# Patient Record
Sex: MALE | Race: WHITE | NOT HISPANIC OR LATINO
[De-identification: names, ages, dates, MRNs, and addresses within clinical notes are randomized per-mention and may not be internally consistent; named-entity substitution may affect disease eponyms.]

---

## 2020-09-26 ENCOUNTER — APPOINTMENT (OUTPATIENT)
Dept: RADIOLOGY | Facility: IMAGING CENTER | Age: 50
End: 2020-09-26
Payer: COMMERCIAL

## 2020-09-26 ENCOUNTER — OUTPATIENT (OUTPATIENT)
Dept: OUTPATIENT SERVICES | Facility: HOSPITAL | Age: 50
LOS: 1 days | End: 2020-09-26
Payer: COMMERCIAL

## 2020-09-26 DIAGNOSIS — Z00.8 ENCOUNTER FOR OTHER GENERAL EXAMINATION: ICD-10-CM

## 2020-09-26 PROCEDURE — 73620 X-RAY EXAM OF FOOT: CPT

## 2020-09-26 PROCEDURE — 71046 X-RAY EXAM CHEST 2 VIEWS: CPT | Mod: 26

## 2020-09-26 PROCEDURE — 73620 X-RAY EXAM OF FOOT: CPT | Mod: 26,RT

## 2020-09-26 PROCEDURE — 71046 X-RAY EXAM CHEST 2 VIEWS: CPT

## 2020-10-31 ENCOUNTER — APPOINTMENT (OUTPATIENT)
Dept: ULTRASOUND IMAGING | Facility: IMAGING CENTER | Age: 50
End: 2020-10-31
Payer: COMMERCIAL

## 2020-10-31 ENCOUNTER — OUTPATIENT (OUTPATIENT)
Dept: OUTPATIENT SERVICES | Facility: HOSPITAL | Age: 50
LOS: 1 days | End: 2020-10-31
Payer: COMMERCIAL

## 2020-10-31 DIAGNOSIS — Z00.8 ENCOUNTER FOR OTHER GENERAL EXAMINATION: ICD-10-CM

## 2020-10-31 PROCEDURE — 76700 US EXAM ABDOM COMPLETE: CPT

## 2020-10-31 PROCEDURE — 76700 US EXAM ABDOM COMPLETE: CPT | Mod: 26

## 2022-03-03 PROBLEM — Z00.00 ENCOUNTER FOR PREVENTIVE HEALTH EXAMINATION: Noted: 2022-03-03

## 2022-03-12 ENCOUNTER — APPOINTMENT (OUTPATIENT)
Dept: CT IMAGING | Facility: IMAGING CENTER | Age: 52
End: 2022-03-12

## 2022-03-12 ENCOUNTER — APPOINTMENT (OUTPATIENT)
Dept: RADIOLOGY | Facility: IMAGING CENTER | Age: 52
End: 2022-03-12

## 2022-04-02 ENCOUNTER — OUTPATIENT (OUTPATIENT)
Dept: OUTPATIENT SERVICES | Facility: HOSPITAL | Age: 52
LOS: 1 days | End: 2022-04-02
Payer: COMMERCIAL

## 2022-04-02 ENCOUNTER — APPOINTMENT (OUTPATIENT)
Dept: CT IMAGING | Facility: IMAGING CENTER | Age: 52
End: 2022-04-02
Payer: COMMERCIAL

## 2022-04-02 ENCOUNTER — APPOINTMENT (OUTPATIENT)
Dept: RADIOLOGY | Facility: IMAGING CENTER | Age: 52
End: 2022-04-02
Payer: COMMERCIAL

## 2022-04-02 DIAGNOSIS — Z00.8 ENCOUNTER FOR OTHER GENERAL EXAMINATION: ICD-10-CM

## 2022-04-02 PROCEDURE — 74177 CT ABD & PELVIS W/CONTRAST: CPT

## 2022-04-02 PROCEDURE — 71046 X-RAY EXAM CHEST 2 VIEWS: CPT | Mod: 26

## 2022-04-02 PROCEDURE — 74177 CT ABD & PELVIS W/CONTRAST: CPT | Mod: 26

## 2022-04-02 PROCEDURE — 71046 X-RAY EXAM CHEST 2 VIEWS: CPT

## 2022-08-06 ENCOUNTER — INPATIENT (INPATIENT)
Facility: HOSPITAL | Age: 52
LOS: 0 days | Discharge: LEFT BEFORE DISCHARGE | End: 2022-08-07
Attending: STUDENT IN AN ORGANIZED HEALTH CARE EDUCATION/TRAINING PROGRAM | Admitting: STUDENT IN AN ORGANIZED HEALTH CARE EDUCATION/TRAINING PROGRAM

## 2022-08-06 VITALS
RESPIRATION RATE: 16 BRPM | HEART RATE: 55 BPM | DIASTOLIC BLOOD PRESSURE: 77 MMHG | SYSTOLIC BLOOD PRESSURE: 110 MMHG | OXYGEN SATURATION: 100 % | TEMPERATURE: 97 F | HEIGHT: 70 IN

## 2022-08-06 DIAGNOSIS — S01.81XA LACERATION WITHOUT FOREIGN BODY OF OTHER PART OF HEAD, INITIAL ENCOUNTER: ICD-10-CM

## 2022-08-06 DIAGNOSIS — F31.9 BIPOLAR DISORDER, UNSPECIFIED: ICD-10-CM

## 2022-08-06 DIAGNOSIS — S02.401A MAXILLARY FRACTURE, UNSPECIFIED SIDE, INITIAL ENCOUNTER FOR CLOSED FRACTURE: ICD-10-CM

## 2022-08-06 DIAGNOSIS — Z29.9 ENCOUNTER FOR PROPHYLACTIC MEASURES, UNSPECIFIED: ICD-10-CM

## 2022-08-06 DIAGNOSIS — R55 SYNCOPE AND COLLAPSE: ICD-10-CM

## 2022-08-06 LAB
ALBUMIN SERPL ELPH-MCNC: 4 G/DL — SIGNIFICANT CHANGE UP (ref 3.3–5)
ALP SERPL-CCNC: 62 U/L — SIGNIFICANT CHANGE UP (ref 40–120)
ALT FLD-CCNC: 36 U/L — SIGNIFICANT CHANGE UP (ref 4–41)
ANION GAP SERPL CALC-SCNC: 11 MMOL/L — SIGNIFICANT CHANGE UP (ref 7–14)
AST SERPL-CCNC: 30 U/L — SIGNIFICANT CHANGE UP (ref 4–40)
BASOPHILS # BLD AUTO: 0.04 K/UL — SIGNIFICANT CHANGE UP (ref 0–0.2)
BASOPHILS NFR BLD AUTO: 0.4 % — SIGNIFICANT CHANGE UP (ref 0–2)
BILIRUB SERPL-MCNC: 0.3 MG/DL — SIGNIFICANT CHANGE UP (ref 0.2–1.2)
BUN SERPL-MCNC: 16 MG/DL — SIGNIFICANT CHANGE UP (ref 7–23)
CALCIUM SERPL-MCNC: 8.9 MG/DL — SIGNIFICANT CHANGE UP (ref 8.4–10.5)
CHLORIDE SERPL-SCNC: 108 MMOL/L — HIGH (ref 98–107)
CO2 SERPL-SCNC: 23 MMOL/L — SIGNIFICANT CHANGE UP (ref 22–31)
CREAT SERPL-MCNC: 0.96 MG/DL — SIGNIFICANT CHANGE UP (ref 0.5–1.3)
EGFR: 95 ML/MIN/1.73M2 — SIGNIFICANT CHANGE UP
EOSINOPHIL # BLD AUTO: 0.03 K/UL — SIGNIFICANT CHANGE UP (ref 0–0.5)
EOSINOPHIL NFR BLD AUTO: 0.3 % — SIGNIFICANT CHANGE UP (ref 0–6)
FLUAV AG NPH QL: SIGNIFICANT CHANGE UP
FLUBV AG NPH QL: SIGNIFICANT CHANGE UP
GLUCOSE SERPL-MCNC: 97 MG/DL — SIGNIFICANT CHANGE UP (ref 70–99)
HCT VFR BLD CALC: 44.4 % — SIGNIFICANT CHANGE UP (ref 39–50)
HGB BLD-MCNC: 14.5 G/DL — SIGNIFICANT CHANGE UP (ref 13–17)
IANC: 6.43 K/UL — SIGNIFICANT CHANGE UP (ref 1.8–7.4)
IMM GRANULOCYTES NFR BLD AUTO: 0.2 % — SIGNIFICANT CHANGE UP (ref 0–1.5)
LYMPHOCYTES # BLD AUTO: 1.85 K/UL — SIGNIFICANT CHANGE UP (ref 1–3.3)
LYMPHOCYTES # BLD AUTO: 20.5 % — SIGNIFICANT CHANGE UP (ref 13–44)
MCHC RBC-ENTMCNC: 32.7 GM/DL — SIGNIFICANT CHANGE UP (ref 32–36)
MCHC RBC-ENTMCNC: 32.9 PG — SIGNIFICANT CHANGE UP (ref 27–34)
MCV RBC AUTO: 100.7 FL — HIGH (ref 80–100)
MONOCYTES # BLD AUTO: 0.66 K/UL — SIGNIFICANT CHANGE UP (ref 0–0.9)
MONOCYTES NFR BLD AUTO: 7.3 % — SIGNIFICANT CHANGE UP (ref 2–14)
NEUTROPHILS # BLD AUTO: 6.43 K/UL — SIGNIFICANT CHANGE UP (ref 1.8–7.4)
NEUTROPHILS NFR BLD AUTO: 71.3 % — SIGNIFICANT CHANGE UP (ref 43–77)
NRBC # BLD: 0 /100 WBCS — SIGNIFICANT CHANGE UP
NRBC # FLD: 0 K/UL — SIGNIFICANT CHANGE UP
PLATELET # BLD AUTO: 185 K/UL — SIGNIFICANT CHANGE UP (ref 150–400)
POTASSIUM SERPL-MCNC: 4.4 MMOL/L — SIGNIFICANT CHANGE UP (ref 3.5–5.3)
POTASSIUM SERPL-SCNC: 4.4 MMOL/L — SIGNIFICANT CHANGE UP (ref 3.5–5.3)
PROT SERPL-MCNC: 6.4 G/DL — SIGNIFICANT CHANGE UP (ref 6–8.3)
RBC # BLD: 4.41 M/UL — SIGNIFICANT CHANGE UP (ref 4.2–5.8)
RBC # FLD: 13 % — SIGNIFICANT CHANGE UP (ref 10.3–14.5)
RSV RNA NPH QL NAA+NON-PROBE: SIGNIFICANT CHANGE UP
SARS-COV-2 RNA SPEC QL NAA+PROBE: SIGNIFICANT CHANGE UP
SODIUM SERPL-SCNC: 142 MMOL/L — SIGNIFICANT CHANGE UP (ref 135–145)
TROPONIN T, HIGH SENSITIVITY RESULT: 10 NG/L — SIGNIFICANT CHANGE UP
WBC # BLD: 9.03 K/UL — SIGNIFICANT CHANGE UP (ref 3.8–10.5)
WBC # FLD AUTO: 9.03 K/UL — SIGNIFICANT CHANGE UP (ref 3.8–10.5)

## 2022-08-06 PROCEDURE — 70486 CT MAXILLOFACIAL W/O DYE: CPT | Mod: 26,MA

## 2022-08-06 PROCEDURE — 93010 ELECTROCARDIOGRAM REPORT: CPT | Mod: NC

## 2022-08-06 PROCEDURE — 71046 X-RAY EXAM CHEST 2 VIEWS: CPT | Mod: 26

## 2022-08-06 PROCEDURE — 99285 EMERGENCY DEPT VISIT HI MDM: CPT | Mod: 25

## 2022-08-06 PROCEDURE — 70450 CT HEAD/BRAIN W/O DYE: CPT | Mod: 26,MA

## 2022-08-06 RX ORDER — LANOLIN ALCOHOL/MO/W.PET/CERES
3 CREAM (GRAM) TOPICAL AT BEDTIME
Refills: 0 | Status: DISCONTINUED | OUTPATIENT
Start: 2022-08-06 | End: 2022-08-07

## 2022-08-06 RX ORDER — TETANUS TOXOID, REDUCED DIPHTHERIA TOXOID AND ACELLULAR PERTUSSIS VACCINE, ADSORBED 5; 2.5; 8; 8; 2.5 [IU]/.5ML; [IU]/.5ML; UG/.5ML; UG/.5ML; UG/.5ML
0.5 SUSPENSION INTRAMUSCULAR ONCE
Refills: 0 | Status: COMPLETED | OUTPATIENT
Start: 2022-08-06 | End: 2022-08-06

## 2022-08-06 RX ORDER — BACITRACIN ZINC 500 UNIT/G
1 OINTMENT IN PACKET (EA) TOPICAL DAILY
Refills: 0 | Status: DISCONTINUED | OUTPATIENT
Start: 2022-08-06 | End: 2022-08-07

## 2022-08-06 RX ADMIN — TETANUS TOXOID, REDUCED DIPHTHERIA TOXOID AND ACELLULAR PERTUSSIS VACCINE, ADSORBED 0.5 MILLILITER(S): 5; 2.5; 8; 8; 2.5 SUSPENSION INTRAMUSCULAR at 19:11
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
## 2022-08-06 NOTE — ED PROVIDER NOTE - CLINICAL SUMMARY MEDICAL DECISION MAKING FREE TEXT BOX
50 y/o male with hx of bipolar disorder type 1 presents with family for evaluation following unwitnessed syncopal episode. Concern for ACS vs arrhythmia. EKG, labs, and CT head obtained. Lac to be repaired by plastics.

## 2022-08-06 NOTE — H&P ADULT - PROBLEM SELECTOR PLAN 2
- Appreciate plastics recs - Sustained form syncopal episode. Left malar laceration, curvilinear  - Repaired by plastics in ED  - Per plastics recs:  clindamycin x 3 days po and bacitracin to incision x 3 days  -  soapy water over incision  - no pools/submerging in water x 6 weeks  - avoid sunlight, can use sunscreen in 2 weeks   - Appreciate plastics recs

## 2022-08-06 NOTE — H&P ADULT - PROBLEM SELECTOR PLAN 1
Unclear etiology of syncope. Patient with lightheadedness prior. Differential includes arrhythmia, cardiac structural cause, orthostasis.   - CTH negative.   - EKG here:  - orthostatic vitals in AM  - f/u TTE  -continue to monitor on telemetry Unclear etiology of syncope. Patient with lightheadedness prior. Differential includes arrhythmia, cardiac structural cause, orthostasis.   - CTH negative.   - bradycardic to HR 40s here, asymptomatic  - orthostatic vitals in AM  - f/u TTE  -continue to monitor on telemetry

## 2022-08-06 NOTE — H&P ADULT - NSHPLABSRESULTS_GEN_ALL_CORE
14.5   9.03  )-----------( 185      ( 06 Aug 2022 17:54 )             44.4       08-06    142  |  108<H>  |  16  ----------------------------<  97  4.4   |  23  |  0.96    Ca    8.9      06 Aug 2022 17:54    TPro  6.4  /  Alb  4.0  /  TBili  0.3  /  DBili  x   /  AST  30  /  ALT  36  /  AlkPhos  62  08-06          < from: CT Maxillofacial No Cont (08.06.22 @ 19:01) >    IMPRESSIONS:    CT BRAIN: Motion limited examination. No intracranial hemorrhage or   calvarial fracture.    CT MAXILLOFACIAL BONES:  1. Questionable fracture of the maxillary spine. Correlate for point   tenderness.  2. Mandible is intact. However, there is anterior displacement of the   right mandibular condyle when compared with the left. While this might be   positional in etiology, correlate for point tenderness. Consider further   evaluation via open and closed mouth MR imaging of the temporomandibular   joint, for further assessment, as clinically indicated.    < end of copied text >    < from: Xray Chest 2 Views PA/Lat (08.06.22 @ 17:25) >      INTERPRETATION:  clear lungs    < end of copied text >

## 2022-08-06 NOTE — ED ADULT NURSE REASSESSMENT NOTE - NS ED NURSE REASSESS COMMENT FT1
Patient AOX4 stated that he passed out then fell. He stated that he loss conscious. Also taking medication for depression, claimed that he stopped taking his depression medication 3 months ago. Having difficulty sleeping for the past three month only have 20hrs of sleep within the  period of three months. 2cm laceration noted under left eye. Tetanus shot was administered as per protocol to left deltoid.

## 2022-08-06 NOTE — ED PROVIDER NOTE - NS ED ROS FT
ROS:  GENERAL: No fever, no chills  HEENT: no vision changes, no trouble swallowing, no trouble speaking  CARDIAC: no chest pain  PULMONARY: no cough, no shortness of breath  GI: no abdominal pain, no nausea, no vomiting, no diarrhea, no constipation  SKIN: As per HPI   NEURO: As per HPI   PSYCH: As per HPI   MSK: No joint pain  All other systems reviewed as negative.

## 2022-08-06 NOTE — H&P ADULT - ATTENDING COMMENTS
52 y/o male with hx of bipolar disorder type 1 who presented to the ED after an unwitnessed syncopal episode, found to have questionable fracture of the maxillary spine. Patient was initially admitted to medicine for further work-up. By the time I went to see the patient, he expressed wishes to leave AMA. His wife was at bedside and expressed the same wishes. They did not want any further management or treatment. They wanted to go to [facility] for further work up. I explained to them if he left, he would leave AMA. I explained the risks of leaving without proper work up and treatment which include recurrence and in worst case scenario death. He understood and verbalized his understanding. At time of his discharge, he was AAOX3 and had good insight. He had the capacity to make his own decision.

## 2022-08-06 NOTE — PROCEDURE NOTE - ADDITIONAL PROCEDURE DETAILS
6 cm vertical curvilinear wound  reapproximated with vicryl after copiously irrigating  5-0 fast for skin

## 2022-08-06 NOTE — ED PROVIDER NOTE - ATTENDING APP SHARED VISIT CONTRIBUTION OF CARE
I have evaluated the patient and agree with the documentation and assessment as made by the KENNA. We have discussed plan of care and work up for the patient.   This was a shared visit with the KENNA. I reviewed and verified the documentation and independently performed the documented:   History, Exam and Medical Decision Making.    51M, bipolar disorder, who presents after a syncopal episode. This morning, was about to eat dinner, began to feel lightheaded and then syncopized and hit his L cheek on a nearby countertop. Never syncopized before. Never seen a cardiologist. Never had prior TTE. Of note, patient is brought in by family - has been non-compliant with meds and generally has been more aggressive over the past 3 weeks. On sertraline and other meds that they left at home. Patient does not sleep; gambles constantly. No headaches, fevers, chills, cough, sputum, belly pain, nvd. Physical: afebrile, non-toxic appearing, calm, ~4cm curvilinear lac noted to L maxilla, rrr, ctabl, abdomen soft, no le edema. Plan: trop, labs, EKG. Lac repair. Dispo pending.

## 2022-08-06 NOTE — ED ADULT NURSE NOTE - OBJECTIVE STATEMENT
Break coverage- Pt received into spot #29. AAOx3, c/o left sided facial pain after having a syncopal episode. Pt states he felt dizzy and fell onto the ground. Laceration noted to left cheek bone. Dressing in place, bleeding is controlled. Denies chest pain at this time. MD gramajo performed. EMS placed IVSL to left ac, labs drawn and sent. no acute distress. Awaiting further plan of care.

## 2022-08-06 NOTE — CONSULT NOTE ADULT - PROBLEM SELECTOR RECOMMENDATION 9
See procedure note  clinda x 3 days po  bactracin to incision x 3 days  can shower tomorrow and allow soapy water over incision, pat dry  no pools/submerging in water x 6 weeks  avoid sunlight, can use sunscreen in 2 weeks   protective clothing

## 2022-08-06 NOTE — ED ADULT TRIAGE NOTE - CHIEF COMPLAINT QUOTE
presents with unwitnessed syncopal episode today ~1230, found by sister on the floor. Lacerations noted to left cheek., denies anti-coagulant use. Appears lethargic. Denies any chest pain or SOB. Non-compliant with psych meds.  pMHX Bipolar, Depression

## 2022-08-06 NOTE — H&P ADULT - PROBLEM SELECTOR PLAN 4
- Concern for worsening sowmya given family history of sleeplessness, gambling.   -Reported non-compliance with home medications  - Psych consult in AM.

## 2022-08-06 NOTE — ED PROVIDER NOTE - PHYSICAL EXAMINATION
CONSTITUTIONAL: Well-appearing; well-nourished; in no apparent distress. Non-toxic appearing.   HEAD: Normocephalic. (+) 5 cm gapping laceration to the left zygoma with TTP.   NEURO: Alert & oriented. Gait steady without assistance. Sensory and motor functions are grossly intact.  PSYCH: Mood appropriate. Thought processes intact.   ENT: Nose midline, non-tender. Lips and mouth atraumatic no edema or lacerations noted.   NECK: Supple. No midline bony tenderness.   CARD: Regular rate and rhythm, no murmurs  RESP: No accessory muscle use; breath sounds clear and equal bilaterally; no wheezes, rhonchi, or rales     ABD: Soft; non-distended; non-tender.   MUSCULOSKELETAL/EXTREMITIES: FROM in all four extremities; no extremity edema.  BACK: No midline bony tenderness. No paraspinous muscle tenderness.   SKIN: Warm; dry; no apparent lesions or exudate

## 2022-08-06 NOTE — H&P ADULT - NSHPREVIEWOFSYSTEMS_GEN_ALL_CORE
CONSTITUTIONAL:  No weight loss, fever, chills, weakness or fatigue.  HEENT:  Eyes:  No visual loss, blurred vision, double vision or yellow sclerae.   SKIN:  No rash or itching.  CARDIOVASCULAR:  No chest pain, chest pressure or chest discomfort. No palpitations.  RESPIRATORY:  No shortness of breath, cough or sputum.  GASTROINTESTINAL:  No anorexia, nausea, vomiting or diarrhea. No abdominal pain or blood.  GENITOURINARY:  Denies hematuria, dysuria.   NEUROLOGICAL:  No headache, dizziness, syncope, paralysis, ataxia, numbness or tingling in the extremities. No change in bowel or bladder control.  MUSCULOSKELETAL:  No muscle, back pain, joint pain or stiffness.  HEMATOLOGIC:  No anemia, bleeding or bruising.  LYMPHATICS:  No enlarged nodes.   PSYCHIATRIC:  No history of depression or anxiety.  ENDOCRINOLOGIC:  No reports of sweating, cold or heat intolerance. No polyuria or polydipsia.  ALLERGIES:  No history of asthma, hives, eczema or rhinitis.
IMPROVE-DD Assessment completed: May  1 2021  8:39PM

## 2022-08-06 NOTE — ED ADULT NURSE REASSESSMENT NOTE - NS ED NURSE REASSESS COMMENT FT1
patient wife requested to sign out  against medical advice.  wife is also requesting to sign him out against medical  advice   admitting medicine was paged . Patient was educated on the risk and benefits including death.

## 2022-08-06 NOTE — PROCEDURE NOTE - NSICDXPROCEDURE_GEN_ALL_CORE_FT
PROCEDURES:  Intermediate repair of laceration of face, 5.1 cm to 7.5 cm 06-Aug-2022 18:07:01  Emile Calloway

## 2022-08-06 NOTE — CONSULT NOTE ADULT - SUBJECTIVE AND OBJECTIVE BOX
51 year old male s/p syncopal episode likely2/2 to decreased PO intake at baseline 2/2 to bipolar disorder as per family.  Patient states he fell and hit his face suddenly ealier today    pmhx: bipolar d/o  no pshx  denies taking medications  allergy to PCN and pollen]    AVSS (HR 55)    AOX3  left malar laceration through subcutaneous fat, 6 cm, vertical, curvilinear, facial nerve function grossly intact, sensation to face grossly intact, no hematoma noted, no drainage noted

## 2022-08-06 NOTE — ED PROVIDER NOTE - NS ED ATTENDING STATEMENT MOD
This was a shared visit with the KENNA. I reviewed and verified the documentation and independently performed the documented:

## 2022-08-06 NOTE — H&P ADULT - PROBLEM SELECTOR PLAN 3
- CT with possible maxillary fracture.   - OMFS consulted in ED, await recs - CT with possible maxillary fracture. Likely 2/2 fall.   - OMFS consulted in ED, await recs

## 2022-08-06 NOTE — H&P ADULT - ASSESSMENT
50 y/o male with hx of bipolar disorder type 1 who presented to the ED after an unwitnessed syncopal episode.

## 2022-08-06 NOTE — ED PROVIDER NOTE - PROGRESS NOTE DETAILS
GHADA Mercado: Plastics consulted as patient would prefer them to repair the wound. Case d/w plastics on call Dr. Castelalnos who requested we contact Dr. Calloway for repair. Dr. Calloway called at [phone] VM left. Pt found to be vinod to high 40s on monitor, resting comfortably at this time no chest pain came back up to 50s upon conversation, still flighty but easily redirectable at this time. - Zee Monroe, PGY-2

## 2022-08-06 NOTE — H&P ADULT - NSHPPHYSICALEXAM_GEN_ALL_CORE
Vital Signs Last 24 Hrs  T(C): 36.3 (06 Aug 2022 13:41), Max: 36.3 (06 Aug 2022 13:41)  T(F): 97.3 (06 Aug 2022 13:41), Max: 97.3 (06 Aug 2022 13:41)  HR: 55 (06 Aug 2022 13:41) (55 - 55)  BP: 110/77 (06 Aug 2022 13:41) (110/77 - 110/77)  BP(mean): --  RR: 16 (06 Aug 2022 13:41) (16 - 16)  SpO2: 100% (06 Aug 2022 13:41) (100% - 100%)    Parameters below as of 06 Aug 2022 13:41  Patient On (Oxygen Delivery Method): room air    PHYSICAL EXAM:  GENERAL: NAD, Resting in bed  HEENT:  Head atraumatic, EOMI, PERRLA, conjunctiva and sclera clear; Moist mucous membranes, normal oropharynx  NECK: Supple, No JVD, no lymphadenopathy, no thyroid nodules or enlargement  CHEST/LUNG: Clear to auscultation bilaterally; No rales, rhonchi, wheezing, or rubs. Unlabored respirations on room air  HEART: Regular rate and rhythm; No murmurs, rubs, or gallops  ABDOMEN: Bowel sounds present; Soft, Nontender, Nondistended. No hepatomegally  EXTREMITIES:  2+ Peripheral Pulses, brisk capillary refill. No clubbing, cyanosis, or edema  NERVOUS SYSTEM:  Alert & Oriented X3, non-focal and spontaneous movements of all extremities  SKIN: No rashes or lesions Vital Signs Last 24 Hrs  T(C): 36.3 (06 Aug 2022 13:41), Max: 36.3 (06 Aug 2022 13:41)  T(F): 97.3 (06 Aug 2022 13:41), Max: 97.3 (06 Aug 2022 13:41)  HR: 55 (06 Aug 2022 13:41) (55 - 55)  BP: 110/77 (06 Aug 2022 13:41) (110/77 - 110/77)  BP(mean): --  RR: 16 (06 Aug 2022 13:41) (16 - 16)  SpO2: 100% (06 Aug 2022 13:41) (100% - 100%)    Parameters below as of 06 Aug 2022 13:41  Patient On (Oxygen Delivery Method): room air    PHYSICAL EXAM:  GENERAL: NAD, Resting in bed  HEENT:  Head atraumatic, PERRLA, conjunctiva and sclera clear  NECK: Supple, No JVD, no lymphadenopathy, no thyroid nodules or enlargement  CHEST/LUNG: Clear to auscultation bilaterally; No rales, rhonchi, wheezing, or rubs. Unlabored respirations on room air  HEART: Regular rate and rhythm; No murmurs, rubs, or gallops  ABDOMEN: Bowel sounds present; Soft, Nontender, Nondistended. No hepatomegally  EXTREMITIES:  2+ Peripheral Pulses, brisk capillary refill. No clubbing, cyanosis, or edema  NERVOUS SYSTEM:  Alert & Oriented X3, non-focal and spontaneous movements of all extremities  SKIN: Left malar laceration, curvilinear

## 2022-08-06 NOTE — ED ADULT NURSE REASSESSMENT NOTE - NS ED NURSE REASSESS COMMENT FT1
patient alert and oriented x4 with period of confusion patient has a laceration  left side of his face . patient is breathing freely on room air  with normal and equal chest rise  and fall .pt is as attached to monitor but is attached to cardiac monitor with bradycardia  HR runs into the high 50s to low 40s. pt also attached to pulse oximetry for continuous monitoring.  patient is asymptomatic denies SOB, dizziness , headache , palpitations, nausea vomiting and chest pain at this time. will continue care and monitor.

## 2022-08-06 NOTE — H&P ADULT - HISTORY OF PRESENT ILLNESS
50 y/o male with hx of bipolar disorder type 1 who presented to the ED after an unwitnessed syncopal episode. Patient was at home, about to eat cake, then felt lightheaded and hit his face/cheek on the countertop. Denies prior headache, dizziness. States that his has not happened before. Thinks he passed out for about 1 minute.     On ED chart review, patient was also accompanied by  brother and sister-in-law, at bedside. State that patient has been non-compliant with psych meds with intermittent aggression for the past three weeks. States that he rarely sleeps or eat and that he also gambles at night.       In the ED, Vital Signs T(F): 97.3, HR: 55, BP: 110/77, RR: 16 , SpO2: 100% on RA. CT maxillary for possible maxillary spine fracture. Also seen by plastics for laceration repair      Sister in law Chanda's cell [phone]

## 2022-08-07 ENCOUNTER — TRANSCRIPTION ENCOUNTER (OUTPATIENT)
Age: 52
End: 2022-08-07

## 2022-08-07 VITALS
DIASTOLIC BLOOD PRESSURE: 83 MMHG | SYSTOLIC BLOOD PRESSURE: 136 MMHG | RESPIRATION RATE: 18 BRPM | OXYGEN SATURATION: 99 % | HEART RATE: 66 BPM

## 2022-08-07 VITALS
HEART RATE: 75 BPM | HEIGHT: 70 IN | RESPIRATION RATE: 16 BRPM | SYSTOLIC BLOOD PRESSURE: 118 MMHG | OXYGEN SATURATION: 100 % | TEMPERATURE: 98 F | DIASTOLIC BLOOD PRESSURE: 73 MMHG

## 2022-08-07 VITALS
DIASTOLIC BLOOD PRESSURE: 87 MMHG | SYSTOLIC BLOOD PRESSURE: 129 MMHG | TEMPERATURE: 99 F | OXYGEN SATURATION: 100 % | RESPIRATION RATE: 18 BRPM | HEART RATE: 56 BPM

## 2022-08-07 DIAGNOSIS — S01.81XA LACERATION WITHOUT FOREIGN BODY OF OTHER PART OF HEAD, INITIAL ENCOUNTER: ICD-10-CM

## 2022-08-07 DIAGNOSIS — Z29.9 ENCOUNTER FOR PROPHYLACTIC MEASURES, UNSPECIFIED: ICD-10-CM

## 2022-08-07 DIAGNOSIS — F31.9 BIPOLAR DISORDER, UNSPECIFIED: ICD-10-CM

## 2022-08-07 DIAGNOSIS — R55 SYNCOPE AND COLLAPSE: ICD-10-CM

## 2022-08-07 DIAGNOSIS — S02.401A MAXILLARY FRACTURE, UNSPECIFIED SIDE, INITIAL ENCOUNTER FOR CLOSED FRACTURE: ICD-10-CM

## 2022-08-07 LAB
FLUAV AG NPH QL: SIGNIFICANT CHANGE UP
FLUBV AG NPH QL: SIGNIFICANT CHANGE UP
RSV RNA NPH QL NAA+NON-PROBE: SIGNIFICANT CHANGE UP
SARS-COV-2 RNA SPEC QL NAA+PROBE: SIGNIFICANT CHANGE UP

## 2022-08-07 PROCEDURE — 99223 1ST HOSP IP/OBS HIGH 75: CPT

## 2022-08-07 PROCEDURE — 99285 EMERGENCY DEPT VISIT HI MDM: CPT

## 2022-08-07 RX ORDER — ENOXAPARIN SODIUM 100 MG/ML
40 INJECTION SUBCUTANEOUS EVERY 24 HOURS
Refills: 0 | Status: DISCONTINUED | OUTPATIENT
Start: 2022-08-07 | End: 2022-08-07

## 2022-08-07 RX ORDER — ACETAMINOPHEN 500 MG
650 TABLET ORAL EVERY 6 HOURS
Refills: 0 | Status: DISCONTINUED | OUTPATIENT
Start: 2022-08-07 | End: 2022-08-07

## 2022-08-07 RX ORDER — BACITRACIN ZINC 500 UNIT/G
1 OINTMENT IN PACKET (EA) TOPICAL
Refills: 0 | Status: DISCONTINUED | OUTPATIENT
Start: 2022-08-07 | End: 2022-08-07

## 2022-08-07 RX ADMIN — Medication 650 MILLIGRAM(S): at 21:23

## 2022-08-07 RX ADMIN — Medication 300 MILLIGRAM(S): at 19:02

## 2022-08-07 RX ADMIN — Medication 1 APPLICATION(S): at 19:03

## 2022-08-07 NOTE — DISCHARGE NOTE PROVIDER - HOSPITAL COURSE
50 y/o male with hx of bipolar disorder type 1 who presented to the ED after an unwitnessed syncopal episode. Patient was at home, about to eat cake, then felt lightheaded and hit his face/cheek on the countertop. Denies prior headache, dizziness. States that his has not happened before. Thinks he passed out for about 1 minute.     On ED chart review, patient was also accompanied by  brother and sister-in-law, at bedside. State that patient has been non-compliant with psych meds with intermittent aggression for the past three weeks. States that he rarely sleeps or eat and that he also gambles at night.       In the ED, Vital Signs T(F): 97.3, HR: 55, BP: 110/77, RR: 16 , SpO2: 100% on RA. CT maxillary for possible maxillary spine fracture. Also seen by plastics for laceration repair.    Called by nursing staff to evaluate patient wanting to leave against medical advice Patient admitted for Syncope with laceration and possible maxillary fracture. At the time of discharge, this patient demonstrated full medical capacity and judgement.  In my conversation with this patient they demonstrated the followin. This patient demonstrated their ability to express and communicate their choice to leave the hospital against medical advice and to refuse further medical care.      2. This patient demonstrated the ability to understand relevant information regarding their diagnosis including the purpose of recommended treatment for their stated medical condition.  The patient remembered this information and demonstrated that they were part of the decision making process in the course of their care.      3. This patient appreciated the significance of their medical condition, their diagnosis, and the consequences for leaving the hospital against medical advice and refusing further medical treatment.  This patient demonstrated clear understanding of the benefits of further evaluation and treatment.  This patient demonstrated clear understanding of the risks of leaving against medical advice and refusing further medical treatment. Explained to patient risks of leaving hospital against medical advice which include further syncope, collapse, and even death. He understands risks of leaving. AMA paperwork signed. Risks also reiterated by wife at bedside. Attending notified.

## 2022-08-07 NOTE — H&P ADULT - PROBLEM SELECTOR PLAN 2
- Sustained form syncopal episode. Left malar laceration, curvilinear  - Repaired by plastics in ED  - Per plastics recs: clindamycin x 3 days po and bacitracin to incision x 3 days  - soapy water over incision  - no pools/submerging in water x 6 weeks  - avoid sunlight, can use sunscreen in 2 weeks

## 2022-08-07 NOTE — ED ADULT NURSE REASSESSMENT NOTE - NS ED NURSE REASSESS COMMENT FT1
Pt requesting to AMA from hospital. ACP notified and in ED to speak with patient. Pt A&Ox4. Pt's IV removed. Per ACP, pt is able to AMA and they will complete appropriate paperwork. Pt provided with 2 metrocards.
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
## 2022-08-07 NOTE — H&P ADULT - NSHPPHYSICALEXAM_GEN_ALL_CORE
PHYSICAL EXAM:  Vital Signs Last 24 Hrs  T(C): 36.9 (07 Aug 2022 12:27), Max: 37 (07 Aug 2022 00:29)  T(F): 98.5 (07 Aug 2022 12:27), Max: 98.6 (07 Aug 2022 00:29)  HR: 75 (07 Aug 2022 12:27) (56 - 75)  BP: 118/73 (07 Aug 2022 12:27) (118/73 - 129/87)  BP(mean): --  RR: 16 (07 Aug 2022 12:27) (16 - 18)  SpO2: 100% (07 Aug 2022 12:27) (100% - 100%)    Parameters below as of 07 Aug 2022 12:27  Patient On (Oxygen Delivery Method): room air      CONSTITUTIONAL: NAD, well-developed, well-groomed  EYES: PERRLA; conjunctiva and sclera clear  ENMT: Moist oral mucosa, no pharyngeal injection or exudates; normal dentition  NECK: Supple, no palpable masses; no thyromegaly  RESPIRATORY: Normal respiratory effort; lungs are clear to auscultation bilaterally  CARDIOVASCULAR: Regular rate and rhythm, normal S1 and S2, no murmur/rub/gallop; No lower extremity edema; Peripheral pulses are 2+ bilaterally  ABDOMEN: Nontender to palpation, normoactive bowel sounds, no rebound/guarding; No hepatosplenomegaly  MUSCULOSKELETAL:  Normal gait; no clubbing or cyanosis of digits; no joint swelling or tenderness to palpation  PSYCH: A+O to person, place, and time; affect appropriate  NEUROLOGY: CN 2-12 are intact and symmetric; no gross sensory deficits   SKIN: No rashes; no palpable lesions

## 2022-08-07 NOTE — ED ADULT NURSE NOTE - NSIMPLEMENTINTERV_GEN_ALL_ED
Implemented All Fall Risk Interventions:  [place] to call system. Call bell, personal items and telephone within reach. Instruct patient to call for assistance. Room bathroom lighting operational. Non-slip footwear when patient is off stretcher. Physically safe environment: no spills, clutter or unnecessary equipment. Stretcher in lowest position, wheels locked, appropriate side rails in place. Provide visual cue, wrist band, yellow gown, etc. Monitor gait and stability. Monitor for mental status changes and reorient to person, place, and time. Review medications for side effects contributing to fall risk. Reinforce activity limits and safety measures with patient and family.

## 2022-08-07 NOTE — H&P ADULT - ASSESSMENT
Patient is a 52yo M with PMH of bipolar disorder type 1 who presented after an unwitnessed syncopal event 1 day prior to presentation.

## 2022-08-07 NOTE — ED PROVIDER NOTE - PHYSICAL EXAMINATION
CONSTITUTIONAL: appears fatigue. A+O x3.   NEURO: Alert & oriented. Gait steady without assistance. Sensory and motor functions are grossly intact.  PSYCH: Mood appropriate. Thought processes intact.   NECK: Supple  CARD: Regular rate and rhythm, no murmurs  RESP: No accessory muscle use; breath sounds clear and equal bilaterally; no wheezes, rhonchi, or rales     ABD: Soft; non-distended; non-tender.   MUSCULOSKELETAL/EXTREMITIES: FROM in all four extremities; no extremity edema.  SKIN: suture to left cheek. Warm; dry; no apparent lesions or exudate

## 2022-08-07 NOTE — ED ADULT TRIAGE NOTE - CHIEF COMPLAINT QUOTE
Pt KIANA this AM was being worked up for syncopal episode his family brought him back to the ED for rest of his work up.

## 2022-08-07 NOTE — ED ADULT NURSE NOTE - EXTENSIONS OF SELF_ADULT
None sinus symptoms/nasal congestion/nasal discharge/nasal obstruction/post-nasal discharge/throat pain/dysphagia

## 2022-08-07 NOTE — H&P ADULT - NSHPLABSRESULTS_GEN_ALL_CORE
LABS:                        14.5   9.03  )-----------( 185      ( 06 Aug 2022 17:54 )             44.4     08-06    142  |  108<H>  |  16  ----------------------------<  97  4.4   |  23  |  0.96    Ca    8.9      06 Aug 2022 17:54    TPro  6.4  /  Alb  4.0  /  TBili  0.3  /  DBili  x   /  AST  30  /  ALT  36  /  AlkPhos  62  08-06        RADIOLOGY & ADDITIONAL TESTS:  New Results Reviewed Today:   New Imaging Personally Reviewed Today: CXR, CT maxillofacial  New Electrocardiogram Personally Reviewed Today: ECG: NSR, incomplete RBBB    COMMUNICATION:  Care Discussed with Consultants/Other Providers and Details of Discussion: discussed with ACP  Discussions with Patient/Family: discussed plan of care with patient

## 2022-08-07 NOTE — CHART NOTE - NSCHARTNOTEFT_GEN_A_CORE
Called by nursing staff to evaluate patient wanting to leave against medical advice Patient admitted for Syncope with laceration and possible maxillary fracture. At the time of discharge, this patient demonstrated full medical capacity and judgement.  In my conversation with this patient they demonstrated the followin. This patient demonstrated their ability to express and communicate their choice to leave the hospital against medical advice and to refuse further medical care.      2. This patient demonstrated the ability to understand relevant information regarding their diagnosis including the purpose of recommended treatment for their stated medical condition.  The patient remembered this information and demonstrated that they were part of the decision making process in the course of their care.      3. This patient appreciated the significance of their medical condition, their diagnosis, and the consequences for leaving the hospital against medical advice and refusing further medical treatment.  This patient demonstrated clear understanding of the benefits of further evaluation and treatment.  This patient demonstrated clear understanding of the risks of leaving against medical advice and refusing further medical treatment. Explained to patient risks of leaving hospital against medical advice which include further syncope, collapse, and even death. He understands risks of leaving. AMA paperwork signed. Risks also reiterated by wife at bedside. Attending notified.     - Mckenna Harrell, PGY-3  Internal Medicine

## 2022-08-07 NOTE — CHART NOTE - NSCHARTNOTEFT_GEN_A_CORE
[facility] was paged regarding this individual due to concern for possible mandibular condyle dislocation. When I went to go see the patient, I was informed that pt had decided to leave hospital AMA. No examination or treatment was performed for this patient by [facility] prior to him leaving the hospital.    Luis Benitez [facility]  Oral & Maxillofacial Surgery  #[id number]

## 2022-08-07 NOTE — ED PROVIDER NOTE - OBJECTIVE STATEMENT
52 y/o male with hx of bipolar disorder type 1 who presented to the ED after an unwitnessed syncopal episode yesterday left AMA after being admitted. pt was brought by his ex-wife home, and stayed with her throughout the time he was out of the hospital. per family member there was no events during the time he was outside. pt was brought to his brother and sis-in-law Chanda later in the morning. Chanda is the next-of-kin who brought him back to the hospital for admission since pt cannot take care of himself, and did not take his medications. pt has been aggressive at work lately and driving whole night with no sleep.   per pt, he has no medical complaint at this time.

## 2022-08-07 NOTE — H&P ADULT - PROBLEM SELECTOR PLAN 1
- Unclear etiology of syncope. Patient with lightheadedness prior. DDx: arrhythmia, cardiac structural cause, orthostasis.   - CTH negative.   - bradycardic to HR 40s here, asymptomatic  - orthostatic vitals in AM  - f/u TTE  - telemetry monitoring - Unclear etiology of syncope. Patient with lightheadedness prior. DDx: arrhythmia, cardiac structural cause, orthostasis.   - CTH negative  - bradycardic to HR 40s here, asymptomatic  - ECG: NSR, incomplete RBBB  - orthostatic vitals in AM  - f/u TTE  - telemetry monitoring

## 2022-08-07 NOTE — ED ADULT NURSE NOTE - OBJECTIVE STATEMENT
patient alert ox3 came in c/o [passed out yesterday and was seen here last night and signed AMA this morning. patient returned to get admitted. denies any new complaints. NAD states he passed out yesterday near the sink and got cut on his face. . sutures to let side face and cheek noted. IV inserted with saline lock. admitted. pending bed.

## 2022-08-07 NOTE — CHART NOTE - NSCHARTNOTEFT_GEN_A_CORE
[facility] MEDICINE NIGHT COVERAGE    Notified by RN pt wants to go home. Pt seen at bedside adamant about going home because he does not want to stay in the hallway "like a dog". Pt states he will only stay if we find him a bed upstairs. Spoke with bed board and unfortunately there are no telemetry beds available. Pt was admitted to the hospital for syncope and AMA on 8/7. Pt returned because his brother was concerned for his safety. Family endorses        - patient dressed in bed and adamant about going home. Patient seen and examined at bedside. Patient was laying in bed comfortably, dressed in his own clothing. Patient A&Ox3, VSS. Risks vs. benefits of leaving against medical advise discussed with patient. Patient verbalizes understanding of risks vs. benefits and still wants to leave. Hospitalist made aware. Instructed RN to remove IVL. Paperwork signed and put in chart.    Pham Reddy PA-C  Department of Medicine  [facility]   In House Page [phone] [facility] MEDICINE NIGHT COVERAGE    Notified by RN pt wants to go home. Pt seen at bedside adamant about going home because he does not want to stay in the hallway "like a dog". Pt states he will only stay if we find him a bed upstairs. Spoke with bed board and unfortunately there are no telemetry beds available. Pt was admitted to the hospital for admitted for syncope with laceration and possible maxillary fracture and AMA on 8/6. Pt returned a few hours later because his brother was concerned for his safety. Family endorses that pt has not been taking his medications for bipolar disorder and that he up all the time driving        - patient dressed in bed and adamant about going home. Patient seen and examined at bedside. Patient was laying in bed comfortably, dressed in his own clothing. Patient A&Ox3, VSS. Risks vs. benefits of leaving against medical advise discussed with patient. Patient verbalizes understanding of risks vs. benefits and still wants to leave. Hospitalist made aware. Instructed RN to remove IVL. Paperwork signed and put in chart.    Pham Reddy PA-C  Department of Medicine  [facility]   In House Page [phone] [facility] MEDICINE NIGHT COVERAGE    Notified by RN pt wants to go home. Pt seen at bedside adamant about going home because he does not want to stay in the hallway "like a dog". Pt states he will only stay if we find him a bed upstairs. Spoke with bed board and unfortunately there are no telemetry beds available. Pt was admitted to the hospital for admitted for syncope with laceration and possible maxillary fracture and AMA on 8/6. Pt returned a few hours later because his brother was concerned for his safety. Spoke with family and pt. Family endorses that pt has not been taking his medications for bipolar disorder and that he up all the time driving on no sleep. Pt states sometimes he makes mistakes and forgets to take his medication but he knows he has bipolar disorder and needs to take his 3 medications for the rest of his life.     Explained risks and benefits of leaving against medical advise. Pt is A+O to self, place and situation. Pt verbalizes understanding of risks and benefits via teach back method. Still adamant about leaving if he does not get a bed. Pt also refusing to sign AMA form because he states if we provided him with a bed he would stay and complete his workup.     Instructed RN to remove IV.     Pham Reddy PA-C  Department of Medicine  [facility]   In House Page [phone] [facility] MEDICINE NIGHT COVERAGE    Notified by RN pt wants to go home. Pt seen at bedside adamant about going home because he does not want to stay in the hallway "like a dog". Pt states he will only stay if we find him a bed upstairs. Spoke with bed board and unfortunately there are no telemetry beds available. Pt was admitted to the hospital for admitted for syncope with laceration and possible maxillary fracture and AMA on 8/6. Pt returned a few hours later because his brother was concerned for his safety. Spoke with family and pt. Family endorses that pt has not been taking his medications for bipolar disorder and that he up all the time driving on no sleep. Pt states sometimes he makes mistakes and forgets to take his medication but he knows he has bipolar disorder and needs to take his 3 medications for the rest of his life.     Explained risks and benefits of leaving against medical advise. Pt is A+O to self, place and situation. Pt verbalizes understanding of risks and benefits via teach back method. Still adamant about leaving if he does not get a bed. Pt also refusing to sign AMA form because he states if we provided him with a bed he would stay and complete his workup.     Will send 2 day supply of clindamycin to pharmacy for facial laceration. Pt aware and will  medication.    Instructed RN to remove IV.     Pham Reddy PA-C  Department of Medicine  [facility]   In House Page [phone]

## 2022-08-07 NOTE — DISCHARGE NOTE PROVIDER - NSDCCPCAREPLAN_GEN_ALL_CORE_FT
PRINCIPAL DISCHARGE DIAGNOSIS  Diagnosis: Syncope  Assessment and Plan of Treatment: You had a syncopal episode. We are concerned that there could be a cardiac cause to your syncope. You decided to leave against medical advice and accepted the risks of leaving. Please follow-up with your [facility] provider

## 2022-08-07 NOTE — H&P ADULT - PROBLEM SELECTOR PLAN 4
- Concern for worsening sowmya given family history of sleeplessness, gambling  - Reported non-compliance with home medications  - Psych consult in AM. - Concern for worsening sowmya given family history of sleeplessness, gambling  - Reported non-compliance with home medications - is meant to be taking sertraline, aripiprazole, and mirtazapine but only takes them occasionally  - consider Psych consult in AM  - denies SI/HI

## 2022-08-07 NOTE — H&P ADULT - NSHPREVIEWOFSYSTEMS_GEN_ALL_CORE
CONSTITUTIONAL: No fever, weight loss, or fatigue  EYES: No eye pain, visual disturbances, or discharge  ENMT:  No difficulty hearing, tinnitus, vertigo; No sinus or throat pain  NECK: No pain or stiffness  BREASTS: No pain, masses, or nipple discharge  RESPIRATORY: No cough, wheezing, chills or hemoptysis; No shortness of breath  CARDIOVASCULAR: No chest pain, palpitations, dizziness, or leg swelling  GASTROINTESTINAL: No abdominal or epigastric pain. No nausea, vomiting, or hematemesis; No diarrhea or constipation. No melena or hematochezia.  GENITOURINARY: No dysuria, frequency, hematuria, or incontinence  NEUROLOGICAL: No headaches, memory loss, loss of strength, numbness, or tremors; reported loss of consciousness 1 minute  SKIN: No itching, burning, rashes, or lesions   LYMPH NODES: No enlarged glands  ENDOCRINE: No heat or cold intolerance; No hair loss  MUSCULOSKELETAL: No joint pain or swelling; No muscle, back, or extremity pain  PSYCHIATRIC: No depression, anxiety, mood swings, or difficulty sleeping  HEME/LYMPH: No easy bruising, or bleeding gums  ALLERGY AND IMMUNOLOGIC: No hives or eczema

## 2022-08-07 NOTE — ED PROVIDER NOTE - CLINICAL SUMMARY MEDICAL DECISION MAKING FREE TEXT BOX
52 yo M here for admission for syncope workup after AMA from hospital this morning. pt is A+O x3, but appears to be tired, and verbalized that he would follow whatever instruction given by the providers and family member.

## 2022-08-07 NOTE — H&P ADULT - HISTORY OF PRESENT ILLNESS
Patient is a 50yo M with PMH of bipolar disorder type 1 who presented after an unwitnessed syncopal event 1 day prior to presentation. He was at home, about to eat cake, and felt lightheaded. He then fell, with trauma to his face and cheek on the countertop. This had never happened to him before. LOC lasted for about 1 minute.     Patient was admitted to [facility] one day prior to presentation for this same event, but left against medical advice. He was then brought back by family. Per family, he has been increasingly aggressive at work and has been driving throughout nights without sleeping.  Patient is a 52yo M with PMH of bipolar disorder type 1 who presented after an unwitnessed syncopal event 1 day prior to presentation. He was at home, about to eat cake, when he fell, with trauma to his face and cheek on the countertop. LOC lasted for about 1 minute. He denied preceding lightheadedness. dizziness, chest pain, palpitations, nausea, visual changes, or hearing changes. He stated that he had a similar episode about 5-6 months prior, but which did not result in any trauma.     Patient was admitted to [facility] one day prior to presentation for this same event, but left against medical advice. He was then brought back by family. Per family, he has been increasingly aggressive at work and has been driving throughout nights without sleeping.     He states that he returned to the hospital because his brother encouraged him to. He reported that his family were worried about him, concerned that he might want to kill himself. He stated that he in no circumstances would want to kill himself, hurt himself, or kill/hurt others. He brought up his daughter, mentioning that he would like to continue to live to see her grow and go to college with a scholarship.

## 2022-08-07 NOTE — CONSULT NOTE ADULT - SUBJECTIVE AND OBJECTIVE BOX
Patient is a 38 y/o Male who presents to [facility] ED s/p fall         HPI:     Patient is a 52yo M with PMH of bipolar disorder type 1 who presented after an unwitnessed syncopal event 1 day prior to presentation. He was at home, about to eat cake, when he fell, with trauma to his face and cheek on the countertop. LOC lasted for about 1 minute. He denied preceding lightheadedness. dizziness, chest pain, palpitations, nausea, visual changes, or hearing changes. He stated that he had a similar episode about 5-6 months prior, but which did not result in any trauma.     Patient was admitted to [facility] one day prior to presentation for this same event, but left against medical advice. He was then brought back by family. Per family, he has been increasingly aggressive at work and has been driving throughout nights without sleeping.     He states that he returned to the hospital because his brother encouraged him to. He reported that his family was worried about him, concerned that he might want to kill himself. He stated that he in no circumstances would want to kill himself, hurt himself, or kill/hurt others. He brought up his daughter, mentioning that he would like to continue to live to see her grow and go to college with a scholarship.        PMH: Denies     Meds: Denies     PSH: Dayne     Allergies: NKDA          SOCIAL HISTORY:     ETOH use: Denies     Tobacco use:  Denies     Recreational drug use: Denies            Review of Systems: Denies fever, chills. Denies Nausea/vomiting/headache. Denies CP, SOB, cough. Denies palpitations. Denies blurred vision/double vision. Denies dysphagia, dyspnea.           Physical Exam:     Gen: AAOx3, NAD     Head: Edema/tenderness to palpation on left mandible/left TMJ joint, no abrasions      Eyes: EOMI, PERRL, visual acuity intact, no diplopia, supra/infra orbital rims intact, no subconjunctival heme, no telecanthus, no exophthalmos     Ears: Gross hearing intact, No heme appreciated, Condylar head palpated bilaterally. Left condyle palpated anterior to TMJ.     Nose: No septal hematoma/asymmetry, no epistaxis bilaterally. no abrasions present, no lacerations.     Malar: No malar depression, No CN V-2 paresthesia     Throat: No LAD, supple, no lesions     Extraoral/Intraoral Exam: ROSA: 30, Dentition grossly intact, occlusion is not stable or reproducible, intraoral lip abrasion, No CN V-3 paresthesia, FOM soft.  No mobility of maxilla/crepitis. TMJ: No clicking/popping, condyle on left palpated anterior to TMJ.           Vital Signs Last 24 Hrs     T(C): 37 (31 Jul 2022 15:58), Max: 37 (31 Jul 2022 15:58)     T(F): 98.6 (31 Jul 2022 15:58), Max: 98.6 (31 Jul 2022 15:58)     HR: 78 (31 Jul 2022 15:58) (78 - 78)     BP: 135/82 (31 Jul 2022 15:58) (135/82 - 135/82)     BP(mean): --     RR: 16 (31 Jul 2022 15:58) (16 - 16)     SpO2: 98% (31 Jul 2022 15:58) (98% - 98%)          Parameters below as of 31 Jul 2022 15:58     Patient On (Oxygen Delivery Method): room air          CT FACE        Assessment and Recommendation:              Plan      ORIF left condyle        Patient is a 50 y/o Male who presents to [facility] ED s/p fall    HPI:   Patient is a 50 y/o male with past medical history of bipolar disorder type 1 who presented after an unwitnessed syncopal event 1 day prior to presentation. He was at home, about to eat cake, when he fell, with trauma to his face and cheek on the countertop. Loss of consciousness lasted for about 1 minute. He denied preceding lightheadedness. dizziness, chest pain, palpitations, nausea, visual changes, or hearing changes. He stated that he had a similar episode about 5-6 months prior, but which did not result in any trauma. Patient was admitted to [facility] one day prior to presentation for this same event, but left against medical advice. He was then brought back by family.      PMH: Bipolar type 1  Meds: Bacitracin, Clindamycin, enoxaparin   All: Penicillin  Psh: Denies      Review of Systems: Denies fever, chills. Denies Nausea/vomiting/headache. Denies CP, SOB, cough. Denies palpitations. Denies blurred vision/double vision. Denies dysphagia, dyspnea.      Physical Exam:   Gen: AAOx3, NAD   Head: No edema/tenderness to palpation, no abrasions. Laceration on left temple with sutures previously placed.   Eyes: EOMI, PERRL, visual acuity intact, no diplopia, supra/infra orbital rims intact, no subconjunctival heme, no telecanthus, no exophthalmos. Periorbital ecchymosis of left eye.    Ears: Gross hearing intact, No heme appreciated, Condylar head palpated bilaterally.   Nose: No septal hematoma/asymmetry, no epistaxis bilaterally. No abrasions/lacerations on nose.   Malar: No malar depression, No CN V-2 paresthesia   Throat: No LAD, supple, no lesions     Extraoral/Intraoral Exam: ROSA: 40, Dentition grossly intact, occlusion is stable and reproducible, No CN V-3 paresthesia, FOM soft.  No mobility of maxilla/crepitis. TMJ: No clicking/popping, N o lose teeth.      Vital Signs Last 24 Hrs  T(C): 36.9 (07 Aug 2022 12:27), Max: 37 (07 Aug 2022 00:29)  T(F): 98.5 (07 Aug 2022 12:27), Max: 98.6 (07 Aug 2022 00:29)  HR: 66 (07 Aug 2022 19:11) (56 - 75)  BP: 136/83 (07 Aug 2022 19:11) (118/73 - 136/83)  BP(mean): --  RR: 18 (07 Aug 2022 19:11) (16 - 18)  SpO2: 99% (07 Aug 2022 19:11) (99% - 100%)    Parameters below as of 07 Aug 2022 19:11  Patient On (Oxygen Delivery Method): room air    CT HEAD:    CLINICAL INDICATION: Syncopal episode with facial strike.  Technique: Noncontrast CT of the head was performed.  Helically acquired axial images from the skullbase to the vertex without the administration of intravenous contrast were obtained. Images were reformatted into coronal and sagittal planes.  COMPARISON: Prior MR brain dated 2/6/2014  FINDINGS:  Motion degraded study limits evaluation.  Age-appropriate involutional changes. There are no areas of abnormal attenuation within the brain parenchyma. There is no intraparenchymal hematoma, mass effect or midline shift. No abnormal extra-axial fluid collections are present. There is no evidence of acute transcortical territorial infarction.    The calvarium is intact. The visualized intraorbital compartments, paranasal sinuses and mastoid complexes appear free of acute disease.    IMPRESSION:  (Please see below)    CT MAXILLOFACIAL BONES  FINDINGS: Somewhat limited by beam hardening artifact related to dental amalgam.  Questionable maxillary spine fracture (5-126). Correlate for point tenderness. The nasal bone and bony nasal septum are intact. There is rightward deviation of the bur in association with the bony nasal septum with narrowing of the right nasal cavity in this location.  The paranasal sinuses are predominantly clear. The orbital walls and orbital floors are intact. Bilateral zygomatic arches are intact. Medial and lateral pterygoid plates are intact.  Bilateral optic globes, optic nerves, and extraocular muscles are relatively symmetric in appearance.  The mandible is intact. However, there is anterior displacement of the right mandibular condyle when compared with the left. While this might be positional in etiology, correlate for point tenderness.    CT MAXILLOFACIAL BONES:  1. Questionable fracture of the maxillary spine. Correlate for point tenderness.  2. Mandible is intact. However, there is anterior displacement of the right mandibular condyle when compared with the left. While this might be positional in etiology, correlate for point tenderness. Consider further evaluation via open and closed mouth MR imaging of the temporomandibular joint, for further assessment, as clinically indicated.

## 2022-08-07 NOTE — ED PROVIDER NOTE - NS ED ROS FT
ROS:  GENERAL: No fever, no chills  HEENT: no vision changes, no trouble swallowing, no trouble speaking  CARDIAC: no chest pain  PULMONARY: no cough, no shortness of breath  GI: no abdominal pain, no nausea, no vomiting, no diarrhea, no constipation  : No dysuria, no frequency, no change in appearance or odor of urine  SKIN: no rashes  NEURO: no headache, no weakness, no dizziness  MSK: No joint pain  All other systems reviewed as negative. As per HPI

## 2022-08-08 NOTE — DISCHARGE NOTE PROVIDER - NSDCCPCAREPLAN_GEN_ALL_CORE_FT
PRINCIPAL DISCHARGE DIAGNOSIS  Diagnosis: Syncope  Assessment and Plan of Treatment: You had a episode of passing out. We are concerned that there could be a cardiac cause to your syncope. You decided to leave against medical advice and accepted the risks of leaving. Please follow-up with your [facility] care provider. Please take all your medications as prescribed.      SECONDARY DISCHARGE DIAGNOSES  Diagnosis: Bipolar disorder  Assessment and Plan of Treatment:      PRINCIPAL DISCHARGE DIAGNOSIS  Diagnosis: Syncope  Assessment and Plan of Treatment: You had a episode of passing out. We are concerned that there could be a cardiac cause to your syncope. You decided to leave against medical advice and accepted the risks of leaving. Please follow-up with your primary care provider. Please return to the hospital if you experience another episode of passing out.      SECONDARY DISCHARGE DIAGNOSES  Diagnosis: Bipolar disorder  Assessment and Plan of Treatment: Please continue to take your medication as prescribed. It is important to also follow up with your Primary Care Provider.

## 2022-08-08 NOTE — DISCHARGE NOTE PROVIDER - HOSPITAL COURSE
50yo M with PMH of bipolar disorder type 1 who presented after an unwitnessed syncopal event 1 day prior to presentation.      Syncope.   - Unclear etiology of syncope. Patient with lightheadedness prior. DDx: arrhythmia, cardiac structural cause, orthostasis.   - CTH negative  - bradycardic to HR 40s here, asymptomatic  - ECG: NSR, incomplete RBBB  - orthostatic vitals in AM  - f/u TTE  - telemetry monitoring.     Laceration of face, complicated.   - Sustained form syncopal episode. Left malar laceration, curvilinear  - Repaired by plastics in ED  - Per plastics recs: clindamycin x 3 days po and bacitracin to incision x 3 days  - soapy water over incision  - no pools/submerging in water x 6 weeks  - avoid sunlight, can use sunscreen in 2 weeks.    Maxillary fracture.    - CT with possible maxillary fracture. Likely 2/2 fall.   - OMFS consulted in ED- no acute intervention.     Bipolar disorder.   ·  Plan: - Concern for worsening sowmya given family history of sleeplessness, gambling  - Reported non-compliance with home medications - is meant to be taking sertraline, aripiprazole, and mirtazapine but only takes them occasionally  - consider Psych consult in AM  - denies SI/HI.    Prophylactic measure.   ·  Plan: - DVT ppx: lovenox  - Diet: Regular.    52yo M with PMH of bipolar disorder type 1 who presented after an unwitnessed syncopal event 1 day prior to presentation.      Syncope.   - Unclear etiology of syncope. Patient with lightheadedness prior. DDx: arrhythmia, cardiac structural cause, orthostasis.   - CTH negative  - bradycardic to HR 40s here, asymptomatic  - ECG: NSR, incomplete RBBB  - orthostatic vitals in AM  - f/u TTE  - telemetry monitoring.     Laceration of face, complicated.   - Sustained form syncopal episode. Left malar laceration, curvilinear  - Repaired by plastics in ED  - Per plastics recs: clindamycin x 3 days po and bacitracin to incision x 3 days  - soapy water over incision  - no pools/submerging in water x 6 weeks  - avoid sunlight, can use sunscreen in 2 weeks.    Maxillary fracture.    - CT with possible maxillary fracture. Likely 2/2 fall.   - OMFS consulted in ED- no acute intervention.     Bipolar disorder.   - Concern for worsening sowmya given family history of sleeplessness, gambling  - Reported non-compliance with home medications - is meant to be taking sertraline, aripiprazole, and mirtazapine but only takes them occasionally  - consider Psych consult in AM  - denies SI/HI.    Prophylactic measure.   ·  Plan: - DVT ppx: lovenox  - Diet: Regular.      8/7/22 2130  Notified by RN - patient dressed in bed and adamant about going home. Patient seen and examined at bedside. Patient was laying in bed comfortably, dressed in his own clothing. Patient A&Ox3, VSS. Risks vs. benefits of leaving against medical advise discussed with patient. Patient verbalizes understanding of risks vs. benefits and still wants to leave. Hospitalist made aware. Instructed RN to remove IVL. Pt refused to sign paperwork.

## 2022-08-11 ENCOUNTER — TRANSCRIPTION ENCOUNTER (OUTPATIENT)
Age: 52
End: 2022-08-11

## 2022-08-11 ENCOUNTER — INPATIENT (INPATIENT)
Facility: HOSPITAL | Age: 52
LOS: 0 days | Discharge: LEFT BEFORE DISCHARGE | DRG: 312 | End: 2022-08-11
Attending: HOSPITALIST | Admitting: HOSPITALIST
Payer: COMMERCIAL

## 2022-08-11 VITALS
HEIGHT: 70 IN | WEIGHT: 164.91 LBS | HEART RATE: 72 BPM | RESPIRATION RATE: 20 BRPM | DIASTOLIC BLOOD PRESSURE: 73 MMHG | OXYGEN SATURATION: 96 % | SYSTOLIC BLOOD PRESSURE: 127 MMHG | TEMPERATURE: 98 F

## 2022-08-11 VITALS
TEMPERATURE: 98 F | HEART RATE: 68 BPM | SYSTOLIC BLOOD PRESSURE: 133 MMHG | OXYGEN SATURATION: 98 % | DIASTOLIC BLOOD PRESSURE: 82 MMHG | RESPIRATION RATE: 17 BRPM

## 2022-08-11 DIAGNOSIS — R55 SYNCOPE AND COLLAPSE: ICD-10-CM

## 2022-08-11 DIAGNOSIS — R42 DIZZINESS AND GIDDINESS: ICD-10-CM

## 2022-08-11 DIAGNOSIS — F31.9 BIPOLAR DISORDER, UNSPECIFIED: ICD-10-CM
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
## 2022-08-11 LAB
ALBUMIN SERPL ELPH-MCNC: 3.6 G/DL — SIGNIFICANT CHANGE UP (ref 3.3–5)
ALP SERPL-CCNC: 59 U/L — SIGNIFICANT CHANGE UP (ref 40–120)
ALT FLD-CCNC: 30 U/L — SIGNIFICANT CHANGE UP (ref 10–45)
ANION GAP SERPL CALC-SCNC: 12 MMOL/L — SIGNIFICANT CHANGE UP (ref 5–17)
APAP SERPL-MCNC: <15 UG/ML — SIGNIFICANT CHANGE UP (ref 10–30)
APPEARANCE UR: CLEAR — SIGNIFICANT CHANGE UP
AST SERPL-CCNC: 22 U/L — SIGNIFICANT CHANGE UP (ref 10–40)
BASE EXCESS BLDV CALC-SCNC: 0.1 MMOL/L — SIGNIFICANT CHANGE UP (ref -2–2)
BASOPHILS # BLD AUTO: 0.07 K/UL — SIGNIFICANT CHANGE UP (ref 0–0.2)
BASOPHILS NFR BLD AUTO: 0.6 % — SIGNIFICANT CHANGE UP (ref 0–2)
BILIRUB SERPL-MCNC: 0.1 MG/DL — LOW (ref 0.2–1.2)
BILIRUB UR-MCNC: NEGATIVE — SIGNIFICANT CHANGE UP
BUN SERPL-MCNC: 16 MG/DL — SIGNIFICANT CHANGE UP (ref 7–23)
CA-I SERPL-SCNC: 1.19 MMOL/L — SIGNIFICANT CHANGE UP (ref 1.15–1.33)
CALCIUM SERPL-MCNC: 8.6 MG/DL — SIGNIFICANT CHANGE UP (ref 8.4–10.5)
CHLORIDE BLDV-SCNC: 106 MMOL/L — SIGNIFICANT CHANGE UP (ref 96–108)
CHLORIDE SERPL-SCNC: 107 MMOL/L — SIGNIFICANT CHANGE UP (ref 96–108)
CO2 BLDV-SCNC: 27 MMOL/L — HIGH (ref 22–26)
CO2 SERPL-SCNC: 23 MMOL/L — SIGNIFICANT CHANGE UP (ref 22–31)
COLOR SPEC: SIGNIFICANT CHANGE UP
CREAT SERPL-MCNC: 0.91 MG/DL — SIGNIFICANT CHANGE UP (ref 0.5–1.3)
DIFF PNL FLD: NEGATIVE — SIGNIFICANT CHANGE UP
EGFR: 101 ML/MIN/1.73M2 — SIGNIFICANT CHANGE UP
EOSINOPHIL # BLD AUTO: 0.4 K/UL — SIGNIFICANT CHANGE UP (ref 0–0.5)
EOSINOPHIL NFR BLD AUTO: 3.6 % — SIGNIFICANT CHANGE UP (ref 0–6)
ETHANOL SERPL-MCNC: <10 MG/DL — SIGNIFICANT CHANGE UP (ref 0–10)
GAS PNL BLDV: 138 MMOL/L — SIGNIFICANT CHANGE UP (ref 136–145)
GAS PNL BLDV: SIGNIFICANT CHANGE UP
GAS PNL BLDV: SIGNIFICANT CHANGE UP
GLUCOSE BLDV-MCNC: 118 MG/DL — HIGH (ref 70–99)
GLUCOSE SERPL-MCNC: 115 MG/DL — HIGH (ref 70–99)
GLUCOSE UR QL: NEGATIVE — SIGNIFICANT CHANGE UP
HCO3 BLDV-SCNC: 26 MMOL/L — SIGNIFICANT CHANGE UP (ref 22–29)
HCT VFR BLD CALC: 39 % — SIGNIFICANT CHANGE UP (ref 39–50)
HCT VFR BLDA CALC: 40 % — SIGNIFICANT CHANGE UP (ref 39–51)
HGB BLD CALC-MCNC: 13.3 G/DL — SIGNIFICANT CHANGE UP (ref 12.6–17.4)
HGB BLD-MCNC: 13 G/DL — SIGNIFICANT CHANGE UP (ref 13–17)
IMM GRANULOCYTES NFR BLD AUTO: 0.4 % — SIGNIFICANT CHANGE UP (ref 0–1.5)
KETONES UR-MCNC: NEGATIVE — SIGNIFICANT CHANGE UP
LACTATE BLDV-MCNC: 1.5 MMOL/L — SIGNIFICANT CHANGE UP (ref 0.7–2)
LEUKOCYTE ESTERASE UR-ACNC: NEGATIVE — SIGNIFICANT CHANGE UP
LYMPHOCYTES # BLD AUTO: 2.86 K/UL — SIGNIFICANT CHANGE UP (ref 1–3.3)
LYMPHOCYTES # BLD AUTO: 25.8 % — SIGNIFICANT CHANGE UP (ref 13–44)
MAGNESIUM SERPL-MCNC: 1.9 MG/DL — SIGNIFICANT CHANGE UP (ref 1.6–2.6)
MCHC RBC-ENTMCNC: 33.3 GM/DL — SIGNIFICANT CHANGE UP (ref 32–36)
MCHC RBC-ENTMCNC: 33.4 PG — SIGNIFICANT CHANGE UP (ref 27–34)
MCV RBC AUTO: 100.3 FL — HIGH (ref 80–100)
MONOCYTES # BLD AUTO: 0.93 K/UL — HIGH (ref 0–0.9)
MONOCYTES NFR BLD AUTO: 8.4 % — SIGNIFICANT CHANGE UP (ref 2–14)
NEUTROPHILS # BLD AUTO: 6.79 K/UL — SIGNIFICANT CHANGE UP (ref 1.8–7.4)
NEUTROPHILS NFR BLD AUTO: 61.2 % — SIGNIFICANT CHANGE UP (ref 43–77)
NITRITE UR-MCNC: NEGATIVE — SIGNIFICANT CHANGE UP
NRBC # BLD: 0 /100 WBCS — SIGNIFICANT CHANGE UP (ref 0–0)
PCO2 BLDV: 46 MMHG — SIGNIFICANT CHANGE UP (ref 42–55)
PH BLDV: 7.36 — SIGNIFICANT CHANGE UP (ref 7.32–7.43)
PH UR: 6.5 — SIGNIFICANT CHANGE UP (ref 5–8)
PHOSPHATE SERPL-MCNC: 3.6 MG/DL — SIGNIFICANT CHANGE UP (ref 2.5–4.5)
PLATELET # BLD AUTO: 180 K/UL — SIGNIFICANT CHANGE UP (ref 150–400)
PO2 BLDV: 50 MMHG — HIGH (ref 25–45)
POTASSIUM BLDV-SCNC: 3.7 MMOL/L — SIGNIFICANT CHANGE UP (ref 3.5–5.1)
POTASSIUM SERPL-MCNC: 3.8 MMOL/L — SIGNIFICANT CHANGE UP (ref 3.5–5.3)
POTASSIUM SERPL-SCNC: 3.8 MMOL/L — SIGNIFICANT CHANGE UP (ref 3.5–5.3)
PROT SERPL-MCNC: 5.9 G/DL — LOW (ref 6–8.3)
PROT UR-MCNC: NEGATIVE — SIGNIFICANT CHANGE UP
RBC # BLD: 3.89 M/UL — LOW (ref 4.2–5.8)
RBC # FLD: 12.5 % — SIGNIFICANT CHANGE UP (ref 10.3–14.5)
SALICYLATES SERPL-MCNC: <2 MG/DL — LOW (ref 15–30)
SAO2 % BLDV: 83.6 % — SIGNIFICANT CHANGE UP (ref 67–88)
SARS-COV-2 RNA SPEC QL NAA+PROBE: SIGNIFICANT CHANGE UP
SODIUM SERPL-SCNC: 142 MMOL/L — SIGNIFICANT CHANGE UP (ref 135–145)
SP GR SPEC: 1.02 — SIGNIFICANT CHANGE UP (ref 1.01–1.02)
TROPONIN T, HIGH SENSITIVITY RESULT: 10 NG/L — SIGNIFICANT CHANGE UP (ref 0–51)
UROBILINOGEN FLD QL: NEGATIVE — SIGNIFICANT CHANGE UP
WBC # BLD: 11.09 K/UL — HIGH (ref 3.8–10.5)
WBC # FLD AUTO: 11.09 K/UL — HIGH (ref 3.8–10.5)

## 2022-08-11 PROCEDURE — 99223 1ST HOSP IP/OBS HIGH 75: CPT

## 2022-08-11 PROCEDURE — 83605 ASSAY OF LACTIC ACID: CPT

## 2022-08-11 PROCEDURE — 71045 X-RAY EXAM CHEST 1 VIEW: CPT

## 2022-08-11 PROCEDURE — 99285 EMERGENCY DEPT VISIT HI MDM: CPT

## 2022-08-11 PROCEDURE — 84100 ASSAY OF PHOSPHORUS: CPT

## 2022-08-11 PROCEDURE — 70450 CT HEAD/BRAIN W/O DYE: CPT | Mod: MA

## 2022-08-11 PROCEDURE — 72125 CT NECK SPINE W/O DYE: CPT | Mod: 26,MA

## 2022-08-11 PROCEDURE — 85018 HEMOGLOBIN: CPT

## 2022-08-11 PROCEDURE — 93356 MYOCRD STRAIN IMG SPCKL TRCK: CPT

## 2022-08-11 PROCEDURE — 99285 EMERGENCY DEPT VISIT HI MDM: CPT | Mod: 25

## 2022-08-11 PROCEDURE — 81003 URINALYSIS AUTO W/O SCOPE: CPT

## 2022-08-11 PROCEDURE — 93010 ELECTROCARDIOGRAM REPORT: CPT | Mod: NC

## 2022-08-11 PROCEDURE — 70450 CT HEAD/BRAIN W/O DYE: CPT | Mod: 26,MA

## 2022-08-11 PROCEDURE — 82435 ASSAY OF BLOOD CHLORIDE: CPT

## 2022-08-11 PROCEDURE — 76377 3D RENDER W/INTRP POSTPROCES: CPT | Mod: 26

## 2022-08-11 PROCEDURE — 70486 CT MAXILLOFACIAL W/O DYE: CPT | Mod: MA

## 2022-08-11 PROCEDURE — 82330 ASSAY OF CALCIUM: CPT

## 2022-08-11 PROCEDURE — 84295 ASSAY OF SERUM SODIUM: CPT

## 2022-08-11 PROCEDURE — 84484 ASSAY OF TROPONIN QUANT: CPT

## 2022-08-11 PROCEDURE — 87086 URINE CULTURE/COLONY COUNT: CPT

## 2022-08-11 PROCEDURE — U0003: CPT

## 2022-08-11 PROCEDURE — 85014 HEMATOCRIT: CPT

## 2022-08-11 PROCEDURE — 36415 COLL VENOUS BLD VENIPUNCTURE: CPT

## 2022-08-11 PROCEDURE — 84132 ASSAY OF SERUM POTASSIUM: CPT

## 2022-08-11 PROCEDURE — U0005: CPT

## 2022-08-11 PROCEDURE — 99236 HOSP IP/OBS SAME DATE HI 85: CPT

## 2022-08-11 PROCEDURE — 99222 1ST HOSP IP/OBS MODERATE 55: CPT

## 2022-08-11 PROCEDURE — 93306 TTE W/DOPPLER COMPLETE: CPT

## 2022-08-11 PROCEDURE — 80053 COMPREHEN METABOLIC PANEL: CPT

## 2022-08-11 PROCEDURE — 93306 TTE W/DOPPLER COMPLETE: CPT | Mod: 26

## 2022-08-11 PROCEDURE — 85025 COMPLETE CBC W/AUTO DIFF WBC: CPT

## 2022-08-11 PROCEDURE — 82947 ASSAY GLUCOSE BLOOD QUANT: CPT

## 2022-08-11 PROCEDURE — 82803 BLOOD GASES ANY COMBINATION: CPT

## 2022-08-11 PROCEDURE — 80307 DRUG TEST PRSMV CHEM ANLYZR: CPT

## 2022-08-11 PROCEDURE — 76377 3D RENDER W/INTRP POSTPROCES: CPT

## 2022-08-11 PROCEDURE — 71045 X-RAY EXAM CHEST 1 VIEW: CPT | Mod: 26

## 2022-08-11 PROCEDURE — 72125 CT NECK SPINE W/O DYE: CPT | Mod: MA

## 2022-08-11 PROCEDURE — 70486 CT MAXILLOFACIAL W/O DYE: CPT | Mod: 26,MA

## 2022-08-11 PROCEDURE — 83735 ASSAY OF MAGNESIUM: CPT

## 2022-08-11 RX ORDER — ACETAMINOPHEN 500 MG
650 TABLET ORAL EVERY 6 HOURS
Refills: 0 | Status: DISCONTINUED | OUTPATIENT
Start: 2022-08-11 | End: 2022-08-11

## 2022-08-11 RX ORDER — LANOLIN ALCOHOL/MO/W.PET/CERES
3 CREAM (GRAM) TOPICAL AT BEDTIME
Refills: 0 | Status: DISCONTINUED | OUTPATIENT
Start: 2022-08-11 | End: 2022-08-11

## 2022-08-11 RX ORDER — ENOXAPARIN SODIUM 100 MG/ML
40 INJECTION SUBCUTANEOUS EVERY 24 HOURS
Refills: 0 | Status: DISCONTINUED | OUTPATIENT
Start: 2022-08-11 | End: 2022-08-11

## 2022-08-11 NOTE — DISCHARGE NOTE PROVIDER - NSDCFUADDAPPT_GEN_ALL_CORE_FT
- Follow up with Dr. Jeovanny Narvaez at [facility], call to make an appointment.  Follow up with cardiologist within 48 hours of discharge. Follow up with Dr. Jeovanny Narvaez at [facility], call to make an appointment.  Follow up with cardiologist within 24-48 hours of discharge.

## 2022-08-11 NOTE — H&P ADULT - NSHPLABSRESULTS_GEN_ALL_CORE
13.0   11.09 )-----------( 180      ( 11 Aug 2022 06:18 )             39.0       08-11    142  |  107  |  16  ----------------------------<  115<H>  3.8   |  23  |  0.91    Ca    8.6      11 Aug 2022 06:18  Phos  3.6     08-11  Mg     1.9     08-11    TPro  5.9<L>  /  Alb  3.6  /  TBili  0.1<L>  /  DBili  x   /  AST  22  /  ALT  30  /  AlkPhos  59  08-11                  Troponin T, High Sensitivity Result: 10 ng/L (08-11-22 @ 08:25)  Troponin T, High Sensitivity Result: 10 ng/L (08-11-22 @ 06:18)

## 2022-08-11 NOTE — BH CONSULTATION LIAISON ASSESSMENT NOTE - DESCRIPTION
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 From [place], came [place] in [place]. Speaks 5 languages. Denies substance abuse.

## 2022-08-11 NOTE — BH CONSULTATION LIAISON ASSESSMENT NOTE - NSBHCHARTREVIEWVS_PSY_A_CORE FT
Vital Signs Last 24 Hrs  T(C): 36.8 (11 Aug 2022 06:22), Max: 36.8 (11 Aug 2022 06:22)  T(F): 98.2 (11 Aug 2022 06:22), Max: 98.2 (11 Aug 2022 06:22)  HR: 64 (11 Aug 2022 11:08) (64 - 75)  BP: 151/92 (11 Aug 2022 11:08) (121/72 - 151/92)  BP(mean): 109 (11 Aug 2022 11:08) (109 - 109)  RR: 18 (11 Aug 2022 11:08) (18 - 20)  SpO2: 100% (11 Aug 2022 11:08) (96% - 100%)    Parameters below as of 11 Aug 2022 11:08  Patient On (Oxygen Delivery Method): room air

## 2022-08-11 NOTE — H&P ADULT - PROBLEM SELECTOR PLAN 3
- s/p fall/syncope with head strike  - CTH/Cervical spine wnl, no evidence of fracture   - CT maxiloofacial no evidence of fracture  - old L facial laceration - s/p fall/syncope with head strike  - CTH/Cervical spine wnl, no evidence of fracture   - CT maxillofacial no evidence of fracture  - old L facial laceration - s/p fall/syncope with head strike  - CTH/Cervical spine wnl, no evidence of fracture   - CT maxillofacial no evidence of fracture  - old L facial laceration, appears, dry, noninfected  - PT

## 2022-08-11 NOTE — H&P ADULT - ASSESSMENT
51 y/o M with Bipolar disorder noncompliant with medications with recent admission x2 at [facility] for syncope(most recent last week, but left AMA), brought in by family for syncopal episode. Admitted for syncope workup.

## 2022-08-11 NOTE — ED ADULT NURSE REASSESSMENT NOTE - NS ED NURSE REASSESS COMMENT FT1
Pt assessed and spoken to by MD Roe. pt fully understands risks of leaving AMA and would like to leave.   Pt denies: headache, dizziness, chest pain, palpitations, cough, SOB, abdominal pain, n/v/d, urinary symptoms, fevers, chills, weakness at this time. Pt assessed and spoken to by GHADA Roe. pt fully understands risks of leaving AMA and would like to leave.   Pt denies: SI/HI, headache, dizziness, chest pain, palpitations, cough, SOB, abdominal pain, n/v/d, urinary symptoms, fevers, chills, weakness at this time.  Addendum: Pt shown there are numbers for taxis in triage and information for public transportation. Pt will call family to come pick him up from Triage.

## 2022-08-11 NOTE — ED ADULT NURSE REASSESSMENT NOTE - NS ED NURSE REASSESS COMMENT FT1
Pt states he wants to sign out AMA, refusing monitor and taking off electrodes. Hospitalist team made aware. IV removed by this RN as pt started to attempt to remove it himself. Pt cooperative with staff. Awaiting provider to come assess pt.

## 2022-08-11 NOTE — H&P ADULT - NSHPPHYSICALEXAM_GEN_ALL_CORE
T(C): 36.8 (08-11-22 @ 06:22), Max: 36.8 (08-11-22 @ 06:22)  HR: 64 (08-11-22 @ 11:08) (64 - 75)  BP: 151/92 (08-11-22 @ 11:08) (121/72 - 151/92)  RR: 18 (08-11-22 @ 11:08) (18 - 20)  SpO2: 100% (08-11-22 @ 11:08) (96% - 100%)

## 2022-08-11 NOTE — ED ADULT NURSE NOTE - OBJECTIVE STATEMENT
52yo M with PMH depression, bipolar disorder presents to ED via EMS s/p syncopal event. Pt states, "I was watering my plants tonight when I had a syncopal event. The same thing happened last week. I hit my head but I do not recall the entire event which is why I called the ambulance". Does not endorse any pain at this time. Denies chest pain, SOB, N/V, blood thinner use, neck/back pain, weakness, fevers/chills, sick contacts. A&Ox3. Strong peripheral pulses. Abdomen soft, nondistended, nontender to palpation. Neurologically intact and follows commands. Breating spontaneous, nonlabored, chest rise symmetrical. Redness and bruising to pt's right orbital, forehead and nose, no obvious deformities on insepction on pt. NSR on cardiac monitor. Ambulatory with steady gait in ED. Stretcher locked and in lowest position, appropriate side rails up. Pt instructed to notify RN if assistance is needed.

## 2022-08-11 NOTE — ED PROVIDER NOTE - WR ORDER ID 1
Was the patient seen in the last year in this department? Yes  Oct Labs in media  Does patient have an active prescription for medications requested? No     Received Request Via: Pharmacy     [id number]

## 2022-08-11 NOTE — DISCHARGE NOTE PROVIDER - PROVIDER TOKENS
FREE:[LAST:[Dr. Jeovanny Narvaez at [facility],],PHONE:[(   )    -],FAX:[(   )    -]] PROVIDER:[TOKEN:[[id number]],FOLLOWUP:[1-3 days]],PROVIDER:[TOKEN:[[id number]],FOLLOWUP:[1-3 days]]

## 2022-08-11 NOTE — DISCHARGE NOTE PROVIDER - CARE PROVIDER_API CALL
Dr. Jeovanny Narvaez at [facility],,   Phone: (   )    -  Fax: (   )    -  Follow Up Time:    Dale Briceño (DO)  Medicine  [address], [address]  [place] [address]  Phone: [phone]  Fax: [phone]  Follow Up Time: 1-3 days    Jeovanny Narvaez)  Psychiatry  [address] [place] [address]  Phone: [phone]  Fax: [phone]  Follow Up Time: 1-3 days

## 2022-08-11 NOTE — ED PROVIDER NOTE - CLINICAL SUMMARY MEDICAL DECISION MAKING FREE TEXT BOX
53 yo M pmhx bipolar 1 disorder not on meds recent admission last week to [facility] brought in by family for syncopal episodes and manic like sx left ama before complete syncope work up was done p/w syncopal episode. VSS. abrasion and swelling to L lateral orbit. abrasion to nose and L forehead. c/f ich vs facial fx vs contusion. c/f syncope 2/2 structural cardiac e- abnormality arrythmia. plan labs ekg ct head neck face and admission for syncope. pt will also need psych cs once admitted.

## 2022-08-11 NOTE — ED PROVIDER NOTE - OBJECTIVE STATEMENT
53 yo M pmhx bipolar 1 disorder not on meds recent admission last week to [facility] brought in by family for syncopal episodes and manic like sx left ama before complete syncope work up was done p/w syncopal episode. pt reports being outside [employer] plants around 2 am had syncopal episode was out for about two minutes. went back to bed for 2-3 hours then called ems. Pt fell hit head. not on ac. no changes in vision headaches n/v dizziness. no chest pain sob before event. no loss of sensation or weakness. no f/c abd pain dysuria. no SI HI AVH.

## 2022-08-11 NOTE — DISCHARGE NOTE PROVIDER - NSDCCPCAREPLAN_GEN_ALL_CORE_FT
PRINCIPAL DISCHARGE DIAGNOSIS  Diagnosis: Lightheadedness  Assessment and Plan of Treatment: Follow up with cardiology      SECONDARY DISCHARGE DIAGNOSES  Diagnosis: Fall at home  Assessment and Plan of Treatment:     Diagnosis: Bipolar illness  Assessment and Plan of Treatment:     Diagnosis: Syncope  Assessment and Plan of Treatment:      PRINCIPAL DISCHARGE DIAGNOSIS  Diagnosis: Lightheadedness  Assessment and Plan of Treatment: Follow up with cardiology      SECONDARY DISCHARGE DIAGNOSES  Diagnosis: Left against medical advice  Assessment and Plan of Treatment: PATIENT LEAVING AMA. PLEASE FOLLOW UP WITH YOUR PCP/CARDIOLOGIST WITHIN 24-48 HOURS.    Diagnosis: Syncope  Assessment and Plan of Treatment: PATIENT LEAVING AMA. PLEASE FOLLOW UP WITH YOUR PCP/CARDIOLOGIST WITHIN 24-48 HOURS.    Diagnosis: Bipolar illness  Assessment and Plan of Treatment:     Diagnosis: Fall at home  Assessment and Plan of Treatment: PATIENT LEAVING AMA. PLEASE FOLLOW UP WITH YOUR PCP/CARDIOLOGIST WITHIN 24-48 HOURS.

## 2022-08-11 NOTE — CHART NOTE - NSCHARTNOTEFT_GEN_A_CORE
MRN-[id number]  THEODORA BECKER    Called by RN due to patient wanting to sign out AMA. Asked patient why he wanted to leave, reports he does not want to stay in the ED any longer.   Patient is AAOx3. I explained at length to the patient the risks of signing out AMA including but not limited to harm, injury, or death. I explained the risks, benefits and alternatives to treatment as well as the risks of refusing treatment at this time. I offered to answer any questions and fully answered any such questions. We believe that the patient fully understands what has been explained and answered.   Hospitalist Dr. Yen notified and made aware of the above. Patient signed AMA form and accepts responsibility for any and all results of this decision.    Jyothi Gustafson PA-C   Department of Medicine   [id number] [id number]  BECKER THEODORA    Called by RN due to patient wanting to sign out AMA. Asked patient why he wanted to leave, reports he does not want to stay in the ED any longer.   Patient is AAOx3. I explained at length to the patient the risks of signing out AMA including but not limited to worsening of condition, injury, or death. I explained the risks, benefits, and alternatives to treatment as well as the risks of refusing treatment at this time. I offered to answer any questions and fully answered any such questions. We believe that the patient fully understands what has been explained and answered.   Hospitalist Dr. Yen notified and made aware of the above. Patient signed AMA form and accepts responsibility for any and all results of this decision.    Jyohti Gustafson PA-C   Department of Medicine   [id number]

## 2022-08-11 NOTE — BH CONSULTATION LIAISON ASSESSMENT NOTE - NSBHCHARTREVIEWLAB_PSY_A_CORE FT
13.0   11.09 )-----------( 180      ( 11 Aug 2022 06:18 )             39.0     08-11    142  |  107  |  16  ----------------------------<  115<H>  3.8   |  23  |  0.91    Ca    8.6      11 Aug 2022 06:18  Phos  3.6     08-11  Mg     1.9     08-11    TPro  5.9<L>  /  Alb  3.6  /  TBili  0.1<L>  /  DBili  x   /  AST  22  /  ALT  30  /  AlkPhos  59  08-11

## 2022-08-11 NOTE — BH CONSULTATION LIAISON ASSESSMENT NOTE - SUMMARY
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 Jeovanny Narvaez at [facility], brought in by family for LOC. Psych consulted for medication management. Patient is somewhat disorganized on exam, poor memory, unclear of medication dosages, denies symptoms of swomya.

## 2022-08-11 NOTE — H&P ADULT - HISTORY OF PRESENT ILLNESS
51 y/o M with Bipolar disorder noncompliant with medications with recent admission x2 at [facility] for syncope(most recent last week), brought in by family for syncopal episode. He left AMA 2x from [facility] after being admitted.  Patient accompanied by brother who claims that he was watering tomatoes in his garden at 2am when he suddenly passed out. He endorses that he got back up after a few minutes. He went to bed after and then called EMS in the morning. He reports LOC, Head strike. He denies lightheadedness, dizziness, chest pain, palpitations, slurred speech, weakness, headaches n/v/d. His family reports that he is noncompliant with medications and that he is up at night several days of the week, and drives in the middle of night. The patient denies SI, HI, auditory/visual hallucinations. He denies any bowel bladder incontinence, seizure like activity tongue biting.  51 y/o M with Bipolar disorder noncompliant with medications with recent admission x2 at [facility] for syncope(most recent last week), brought in by family for syncopal episode. He left AMA 2x from [facility] after being admitted.  Patient accompanied by brother who claims that he was watering tomatoes in his garden at 2am when he suddenly passed out. Patient endorses he passed out, and that he got back up after a few minutes.  He reports LOC, Head strike. He went to bed after and then called EMS in the morning. He denies lightheadedness, dizziness, chest pain, palpitations prior to the fall. He also denies slurred speech, weakness, headaches n/v/d. His family reports that he is noncompliant with medications and that he is up at night several days of the week, and drives in the middle of night. The patient denies SI, HI, auditory/visual hallucinations. He denies any bowel bladder incontinence, seizure like activity tongue biting.

## 2022-08-11 NOTE — BH CONSULTATION LIAISON ASSESSMENT NOTE - OTHER PAST PSYCHIATRIC HISTORY (INCLUDE DETAILS REGARDING ONSET, COURSE OF ILLNESS, INPATIENT/OUTPATIENT TREATMENT)
Bipolar I  3 prior psychiatric hospitalizations, last hospitalization over 9 years ago  1 prior suicide attempt via OD, 1 self-aborted attempt via jumping off a bridge  Medications: abilify, zoloft, mirtazapine (doses unknown), noncompliant  Outpatient psychiatry with Jeovanny Narvaez   Bipolar I vs MDD with psychotic features  3 prior psychiatric hospitalizations, last hospitalization over 9 years ago  1 prior suicide attempt via OD, 1 self-aborted attempt via jumping off a bridge  Medications: abilify, zoloft, mirtazapine (doses unknown), noncompliant  Outpatient psychiatry with Jeovanny Narvaez
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
## 2022-08-11 NOTE — ED ADULT NURSE NOTE - SUICIDE SCREENING QUESTION 3
Milk Suppression Teaching reviewed with patient, along with other handouts for Non-Nursing Mothers   No

## 2022-08-11 NOTE — ED PROVIDER NOTE - PHYSICAL EXAMINATION
Gen: NAD, non-toxic appearing  Head: normal appearing  HEENT: abrasion and swelling to L lateral orbit. abrasion to nose and L forehead.   Lung: no respiratory distress, speaking in full sentences, CTA b/l     CV: regular rate and rhythm, no murmurs  Abd: soft, non distended, non tender   MSK: no visible deformities  Neuro: No focal deficits, AAOx3  Skin: Warm  Psych: pressured speech tangential thoughts.

## 2022-08-11 NOTE — DISCHARGE NOTE PROVIDER - HOSPITAL COURSE
51 y/o M with Bipolar disorder noncompliant with medications with recent admission x2 at [facility] for syncope (most recent last week), brought in by family for syncopal episode. He left AMA 2x from [facility] after being admitted.  Per patient's brother, patient was watering tomatoes in his garden at 2AM when he suddenly passed out. Patient endorses he passed out, and that he got back up after a few minutes.  He reports LOC, Head strike. He went to bed after and then called EMS in the morning. He denies lightheadedness, dizziness, chest pain, palpitations prior to the fall. He also denies slurred speech, weakness, headaches n/v/d. His family reports that he is noncompliant with medications and that he is up at night several days of the week, and drives in the middle of night. The patient denies SI, HI, auditory/visual hallucinations. He denies any bowel bladder incontinence, seizure like activity tongue biting. CTH/Cervical-spine within normal, no evidence of fracture; CT maxillofacial without evidence of fracture.   Patient now signing out AMA; stating he does not want to stay in the ED.   Patient is AAOx3. I explained at length to the patient the risks of signing out AMA including but not limited to harm, injury, or death. I explained the risks, benefits and alternatives to treatment as well as the risks of refusing treatment at this time. I offered to answer any questions and fully answered any such questions. We believe that the patient fully understands what has been explained and answered. Attending Dr. Yen notified and made aware of the above. Patient signed AMA form and accepts responsibility for any and all results of this decision.   51 y/o M with Bipolar disorder noncompliant with medications with recent admission x2 at [facility] for syncope (most recent last week), brought in by family for syncopal episode. He left AMA 2x from [facility] after being admitted.  Per patient's brother, patient was watering tomatoes in his garden at 2AM when he suddenly passed out. Patient endorses he passed out, and that he got back up after a few minutes.  He reports LOC, Head strike. He went to bed after and then called EMS in the morning. He denies lightheadedness, dizziness, chest pain, palpitations prior to the fall. He also denies slurred speech, weakness, headaches n/v/d. His family reports that he is noncompliant with medications and that he is up at night several days of the week, and drives in the middle of night. The patient denies SI, HI, auditory/visual hallucinations. He denies any bowel bladder incontinence, seizure like activity tongue biting. CTH/Cervical-spine within normal, no evidence of fracture; CT maxillofacial without evidence of fracture.   Patient now signing out AMA; stating he does not want to stay in the ED.   Patient is AAOx3. I explained at length to the patient the risks of signing out AMA including but not limited to harm, injury, or death. I explained the risks, benefits and alternatives to treatment as well as the risks of refusing treatment at this time. I offered to answer any questions and fully answered any such questions. We believe that the patient fully understands what has been explained and answered. Hospitalist Dr. Yen notified and made aware of the above. Patient signed AMA form and accepts responsibility for any and all results of this decision.   53 y/o M with Bipolar disorder noncompliant with medications with recent admission x2 at [facility] for syncope (most recent last week), brought in by family for syncopal episode. He left AMA 2x from [facility] after being admitted.  Per patient's brother, patient was watering tomatoes in his garden at 2AM when he suddenly passed out. Patient endorses he passed out, and that he got back up after a few minutes.  He reports LOC, Head strike. He went to bed after and then called EMS in the morning. He denies lightheadedness, dizziness, chest pain, palpitations prior to the fall. He also denies slurred speech, weakness, headaches n/v/d. His family reports that he is noncompliant with medications and that he is up at night several days of the week, and drives in the middle of night. The patient denies SI, HI, auditory/visual hallucinations. He denies any bowel bladder incontinence, seizure like activity tongue biting. CTH/Cervical-spine within normal, no evidence of fracture; CT maxillofacial without evidence of fracture.   Patient now signing out AMA; stating he does not want to stay in the ED.   Patient is AAOx3. I explained at length to the patient the risks of signing out AMA including but not limited to worsening of condition, injury, or death. I explained the risks, benefits and alternatives to treatment as well as the risks of refusing treatment at this time. I offered to answer any questions and fully answered any such questions. We believe that the patient fully understands what has been explained and answered. Hospitalist Dr. Yen notified and made aware of the above. Patient signed AMA form and accepts responsibility for any and all results of this decision.

## 2022-08-11 NOTE — BH CONSULTATION LIAISON ASSESSMENT NOTE - HPI (INCLUDE ILLNESS QUALITY, SEVERITY, DURATION, TIMING, CONTEXT, MODIFYING FACTORS, ASSOCIATED SIGNS AND SYMPTOMS)
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 Jeovanny Narvaez at [facility], brought in by family for LOC. Psych consulted for medication management.    Patient presented to [facility] twice in the last few days with syncope and left AMA x2 after being admitted. Previous syncopal episode resulted in facial laceration, repaired by plastics in the ED at [facility]. Cardiac workup incomplete however found to be bradycardic with incomplete RBBB on EKG. Now presenting after another episode of syncope overnight.  Patient accompanied by brother who claims that he was watering tomatoes in his garden at 2am when he suddenly passed out. Patient endorses he passed out, and that he got back up after a few minutes.  He reports LOC, Head strike. He went to bed after and then called EMS in the morning.    On initial interview the patient was resting comfortably in a hospital bed in the ED. He has poor articulation and has a slightly disorganized TP with poor memory. He admits that he takes his medications irregularly "when I feel depressed" but says that he has been taking them as prescribed since Saturday. Is unsure of the dosages of his medications but says he takes abilify, mirtazapine, and zoloft. Says that he has been sleeping fine at home. Discusses psychiatric history with bright affect throughout interview, overly familiar and innappropriate at times. Denies any SI/HI/AVH. Denies substance abuse. Does not want to stay in the hospital but understands that he needs a cardiac workup. Patient says that he does not like his outpatient and psychiatrist and would like a new one, is willing to pay whatever it costs.     Per chart review, family reports decreased sleep and increase in gambling behavior recently. Attempted to reach outpatient psychiatrist for collateral but was unsuccessful.  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 Jeovanny Narvaez at [facility], brought in by family for LOC. Psych consulted for medication management.    Patient presented to [facility] twice in the last few days with syncope and left AMA x2 after being admitted. Previous syncopal episode resulted in facial laceration, repaired by plastics in the ED at [facility]. Cardiac workup incomplete however found to be bradycardic with incomplete RBBB on EKG. Now presenting after another episode of syncope overnight.  Patient accompanied by brother who claims that he was watering tomatoes in his garden at 2am when he suddenly passed out. Patient endorses he passed out, and that he got back up after a few minutes.  He reports LOC, Head strike. He went to bed after and then called EMS in the morning.    On initial interview the patient was resting comfortably in a hospital bed in the ED. He has poor articulation and has a slightly disorganized TP with poor memory. He admits that he takes his medications irregularly "when I feel depressed" but says that he has been taking them as prescribed since Saturday. Is unsure of the dosages of his medications but says he takes abilify, mirtazapine, and zoloft. Says that he has been sleeping fine at home. Discusses psychiatric history with bright affect throughout interview, overly familiar and innappropriate at times. Denies any SI/HI/AVH. Denies substance abuse. Does not want to stay in the hospital but understands that he needs a cardiac workup. Patient says that he does not like his outpatient and psychiatrist and would like a new one, is willing to pay whatever it costs.     Per chart review, family reports decreased sleep and increase in gambling behavior recently. Attempted to reach outpatient psychiatrist for collateral but was unsuccessful.

## 2022-08-11 NOTE — ED ADULT NURSE REASSESSMENT NOTE - NS ED NURSE REASSESS COMMENT FT1
Handoff received from offgoing RN Indigo. Pt alert and oriented x 4, VSS, in NAD. Pt in NSR on cardiac monitor. Pt denies chest pain, offers no complaints. Pt admitted to tele, awaiting bed. Cardiac monitoring maintained as per orders. Pt safety and comfort measures maintained.

## 2022-08-11 NOTE — H&P ADULT - PROBLEM SELECTOR PLAN 2
- uncontrolled due to noncompliance to medications  - reports that patient has been compliant with sertraline, aripiprazole, and mirtazapine since Saturday last week  - pressured speech, insomnia complaints by family.   - no SI/HI, auditory visual hallucinations - uncontrolled due to noncompliance to medications  - reports that patient has been compliant with sertraline, aripiprazole, and mirtazapine since Saturday last week  - pressured speech, insomnia complaints by family.   - no SI/HI, auditory visual hallucinations  - psych consult

## 2022-08-11 NOTE — ED ADULT NURSE REASSESSMENT NOTE - NS ED NURSE REASSESS COMMENT FT1
pt received alert and oriented denies any pain NSR on monitor pt gave urine specimen to lab with repeat troponin sent pt expresses he wants to go home has therapy today pt pending disposition

## 2022-08-11 NOTE — BH CONSULTATION LIAISON ASSESSMENT NOTE - CURRENT MEDICATION
MEDICATIONS  (STANDING):  enoxaparin Injectable 40 milliGRAM(s) SubCutaneous every 24 hours    MEDICATIONS  (PRN):  acetaminophen     Tablet .. 650 milliGRAM(s) Oral every 6 hours PRN Temp greater or equal to 38C (100.4F), Mild Pain (1 - 3)  aluminum hydroxide/magnesium hydroxide/simethicone Suspension 30 milliLiter(s) Oral every 4 hours PRN Dyspepsia  melatonin 3 milliGRAM(s) Oral at bedtime PRN Insomnia

## 2022-08-11 NOTE — BH CONSULTATION LIAISON ASSESSMENT NOTE - NSBHATTESTCOMMENTATTENDFT_PSY_A_CORE
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 Jeovanny Narvaez at [facility], brought in by family for LOC. Psych consulted for medication management. Patient is somewhat disorganized on exam, poor memory, unclear of medication dosages, denies symptoms of sowmya.  clarify dosages of meds, can f/u with outps psychiatrist

## 2022-08-11 NOTE — H&P ADULT - NSHPSOCIALHISTORY_GEN_ALL_CORE
Social History:    Marital Status:  (   )    (  X ) Single    (   )    (  )   Occupation:   Lives with: (  ) alone  (  ) children   (  ) spouse   (  ) parents  ( X ) other    Substance Use (street drugs): ( X ) never used  (  ) other:  Tobacco Usage:  (  X)  alcohol x  Denies any illicit drug use.

## 2022-08-11 NOTE — DISCHARGE NOTE PROVIDER - NSDCMRMEDTOKEN_GEN_ALL_CORE_FT
Abilify 5 mg oral tablet: 1 tab(s) orally once a day  clindamycin 300 mg oral capsule: 1 cap(s) orally every 6 hours x 6 day supply filled on 8/8/22  mirtazapine 30 mg oral tablet: 1 tab(s) orally once a day (at bedtime)  SEROquel 300 mg oral tablet: 1 tab(s) orally once a day (at bedtime)  Zoloft 100 mg oral tablet: 2 tab(s) orally once a day

## 2022-08-11 NOTE — BH CONSULTATION LIAISON ASSESSMENT NOTE - RISK ASSESSMENT
Risk factors: h/o psych admissions, noncompliant with treatment, h/o SA    Protective factors: no current SIIP/HIIP, no access to weapons, social supports, engaged in treatment    Overall, pt is a low risk of harm to self/others and does not meet criteria for psychiatric admission.

## 2022-08-12 ENCOUNTER — INPATIENT (INPATIENT)
Facility: HOSPITAL | Age: 52
LOS: 13 days | Discharge: PSYCHIATRIC OR SUBSTANCE USE FACILITY | End: 2022-08-26
Attending: STUDENT IN AN ORGANIZED HEALTH CARE EDUCATION/TRAINING PROGRAM | Admitting: STUDENT IN AN ORGANIZED HEALTH CARE EDUCATION/TRAINING PROGRAM
Payer: COMMERCIAL

## 2022-08-12 VITALS
DIASTOLIC BLOOD PRESSURE: 66 MMHG | OXYGEN SATURATION: 98 % | SYSTOLIC BLOOD PRESSURE: 119 MMHG | HEIGHT: 70 IN | RESPIRATION RATE: 17 BRPM | HEART RATE: 54 BPM | TEMPERATURE: 99 F

## 2022-08-12 DIAGNOSIS — R55 SYNCOPE AND COLLAPSE: ICD-10-CM

## 2022-08-12 DIAGNOSIS — F31.9 BIPOLAR DISORDER, UNSPECIFIED: ICD-10-CM

## 2022-08-12 LAB
ALBUMIN SERPL ELPH-MCNC: 3.9 G/DL — SIGNIFICANT CHANGE UP (ref 3.3–5)
ALP SERPL-CCNC: 60 U/L — SIGNIFICANT CHANGE UP (ref 40–120)
ALT FLD-CCNC: 29 U/L — SIGNIFICANT CHANGE UP (ref 4–41)
AMPHET UR-MCNC: NEGATIVE — SIGNIFICANT CHANGE UP
ANION GAP SERPL CALC-SCNC: 10 MMOL/L — SIGNIFICANT CHANGE UP (ref 7–14)
APAP SERPL-MCNC: <10 UG/ML — LOW (ref 15–25)
APPEARANCE UR: CLEAR — SIGNIFICANT CHANGE UP
AST SERPL-CCNC: 24 U/L — SIGNIFICANT CHANGE UP (ref 4–40)
BARBITURATES UR SCN-MCNC: NEGATIVE — SIGNIFICANT CHANGE UP
BASOPHILS # BLD AUTO: 0.07 K/UL — SIGNIFICANT CHANGE UP (ref 0–0.2)
BASOPHILS NFR BLD AUTO: 0.8 % — SIGNIFICANT CHANGE UP (ref 0–2)
BENZODIAZ UR-MCNC: NEGATIVE — SIGNIFICANT CHANGE UP
BILIRUB SERPL-MCNC: 0.2 MG/DL — SIGNIFICANT CHANGE UP (ref 0.2–1.2)
BILIRUB UR-MCNC: NEGATIVE — SIGNIFICANT CHANGE UP
BUN SERPL-MCNC: 15 MG/DL — SIGNIFICANT CHANGE UP (ref 7–23)
CALCIUM SERPL-MCNC: 9 MG/DL — SIGNIFICANT CHANGE UP (ref 8.4–10.5)
CHLORIDE SERPL-SCNC: 107 MMOL/L — SIGNIFICANT CHANGE UP (ref 98–107)
CO2 SERPL-SCNC: 23 MMOL/L — SIGNIFICANT CHANGE UP (ref 22–31)
COCAINE METAB.OTHER UR-MCNC: NEGATIVE — SIGNIFICANT CHANGE UP
COLOR SPEC: SIGNIFICANT CHANGE UP
CREAT SERPL-MCNC: 0.91 MG/DL — SIGNIFICANT CHANGE UP (ref 0.5–1.3)
CREATININE URINE RESULT, DAU: 66 MG/DL — SIGNIFICANT CHANGE UP
DIFF PNL FLD: NEGATIVE — SIGNIFICANT CHANGE UP
EGFR: 101 ML/MIN/1.73M2 — SIGNIFICANT CHANGE UP
EOSINOPHIL # BLD AUTO: 0.41 K/UL — SIGNIFICANT CHANGE UP (ref 0–0.5)
EOSINOPHIL NFR BLD AUTO: 4.8 % — SIGNIFICANT CHANGE UP (ref 0–6)
ETHANOL SERPL-MCNC: 11 MG/DL — HIGH
GLUCOSE SERPL-MCNC: 96 MG/DL — SIGNIFICANT CHANGE UP (ref 70–99)
GLUCOSE UR QL: NEGATIVE — SIGNIFICANT CHANGE UP
HCT VFR BLD CALC: 41.6 % — SIGNIFICANT CHANGE UP (ref 39–50)
HGB BLD-MCNC: 13.7 G/DL — SIGNIFICANT CHANGE UP (ref 13–17)
IANC: 4.11 K/UL — SIGNIFICANT CHANGE UP (ref 1.8–7.4)
IMM GRANULOCYTES NFR BLD AUTO: 0.4 % — SIGNIFICANT CHANGE UP (ref 0–1.5)
KETONES UR-MCNC: NEGATIVE — SIGNIFICANT CHANGE UP
LEUKOCYTE ESTERASE UR-ACNC: NEGATIVE — SIGNIFICANT CHANGE UP
LYMPHOCYTES # BLD AUTO: 3.22 K/UL — SIGNIFICANT CHANGE UP (ref 1–3.3)
LYMPHOCYTES # BLD AUTO: 37.7 % — SIGNIFICANT CHANGE UP (ref 13–44)
MCHC RBC-ENTMCNC: 32.9 GM/DL — SIGNIFICANT CHANGE UP (ref 32–36)
MCHC RBC-ENTMCNC: 33.2 PG — SIGNIFICANT CHANGE UP (ref 27–34)
MCV RBC AUTO: 100.7 FL — HIGH (ref 80–100)
METHADONE UR-MCNC: NEGATIVE — SIGNIFICANT CHANGE UP
MONOCYTES # BLD AUTO: 0.7 K/UL — SIGNIFICANT CHANGE UP (ref 0–0.9)
MONOCYTES NFR BLD AUTO: 8.2 % — SIGNIFICANT CHANGE UP (ref 2–14)
NEUTROPHILS # BLD AUTO: 4.11 K/UL — SIGNIFICANT CHANGE UP (ref 1.8–7.4)
NEUTROPHILS NFR BLD AUTO: 48.1 % — SIGNIFICANT CHANGE UP (ref 43–77)
NITRITE UR-MCNC: NEGATIVE — SIGNIFICANT CHANGE UP
NRBC # BLD: 0 /100 WBCS — SIGNIFICANT CHANGE UP
NRBC # FLD: 0 K/UL — SIGNIFICANT CHANGE UP
OPIATES UR-MCNC: NEGATIVE — SIGNIFICANT CHANGE UP
OXYCODONE UR-MCNC: NEGATIVE — SIGNIFICANT CHANGE UP
PCP SPEC-MCNC: SIGNIFICANT CHANGE UP
PCP UR-MCNC: NEGATIVE — SIGNIFICANT CHANGE UP
PH UR: 7.5 — SIGNIFICANT CHANGE UP (ref 5–8)
PLATELET # BLD AUTO: 192 K/UL — SIGNIFICANT CHANGE UP (ref 150–400)
POTASSIUM SERPL-MCNC: 4.6 MMOL/L — SIGNIFICANT CHANGE UP (ref 3.5–5.3)
POTASSIUM SERPL-SCNC: 4.6 MMOL/L — SIGNIFICANT CHANGE UP (ref 3.5–5.3)
PROT SERPL-MCNC: 6.2 G/DL — SIGNIFICANT CHANGE UP (ref 6–8.3)
PROT UR-MCNC: NEGATIVE — SIGNIFICANT CHANGE UP
RBC # BLD: 4.13 M/UL — LOW (ref 4.2–5.8)
RBC # FLD: 12.4 % — SIGNIFICANT CHANGE UP (ref 10.3–14.5)
SALICYLATES SERPL-MCNC: <0.3 MG/DL — LOW (ref 15–30)
SARS-COV-2 RNA SPEC QL NAA+PROBE: SIGNIFICANT CHANGE UP
SODIUM SERPL-SCNC: 140 MMOL/L — SIGNIFICANT CHANGE UP (ref 135–145)
SP GR SPEC: 1.01 — SIGNIFICANT CHANGE UP (ref 1.01–1.05)
THC UR QL: NEGATIVE — SIGNIFICANT CHANGE UP
TSH SERPL-MCNC: 0.76 UIU/ML — SIGNIFICANT CHANGE UP (ref 0.27–4.2)
UROBILINOGEN FLD QL: SIGNIFICANT CHANGE UP
WBC # BLD: 8.54 K/UL — SIGNIFICANT CHANGE UP (ref 3.8–10.5)
WBC # FLD AUTO: 8.54 K/UL — SIGNIFICANT CHANGE UP (ref 3.8–10.5)

## 2022-08-12 PROCEDURE — 99285 EMERGENCY DEPT VISIT HI MDM: CPT

## 2022-08-12 PROCEDURE — 70450 CT HEAD/BRAIN W/O DYE: CPT | Mod: 26,ME

## 2022-08-12 PROCEDURE — G1004: CPT

## 2022-08-12 RX ORDER — ACETAMINOPHEN 500 MG
650 TABLET ORAL ONCE
Refills: 0 | Status: COMPLETED | OUTPATIENT
Start: 2022-08-12 | End: 2022-08-12

## 2022-08-12 RX ADMIN — Medication 650 MILLIGRAM(S): at 22:55

## 2022-08-12 NOTE — ED BEHAVIORAL HEALTH ASSESSMENT NOTE - VIOLENCE RISK FACTORS:
Affective dysregulation/Impulsivity/Lack of insight into violence risk/need for treatment/Noncompliance with treatment/Community stressors that increase the risk of destabilization

## 2022-08-12 NOTE — ED PROVIDER NOTE - CHIEF COMPLAINT
The patient is a 51y Male complaining of  The patient is a 51y Male complaining of falls and manic behavior

## 2022-08-12 NOTE — ED BEHAVIORAL HEALTH ASSESSMENT NOTE - SUMMARY
Mr. Zambrano is a 52 yo M,  from wife, from [place], recently lost job, with hx of Bipolar Disorder, hx of multiple  inpatient psychiatric hospitalizations last in 2014, hx of two SAs (overdose, and aborted attempted related to bridge), hx of gambling, no substance use disorder, + recent verbally aggressive behavior. + recent multiple legal issues- speeding citations. Currently treated at [facility] since 2014. PMH- 2 recent falls and seen at [facility] ED last on 8/7/22- suture to head and signed out AMA refusing cardiac/telemetry work up. BIBA refereed by psychiatrist for manic behavior.     Patient presents to the ED in the context of manic behavior. Patient has been non complaint with medication and although he has a history of a gambling problem- he has not been sleeping, gambling issues have worsened, he is acting erratically and shows poor insight into both psychiatric and medication issues - leaving AMA refusing medical work ups for syncopal episodes. Patient shows poor insight and judgement and is impulsive and unpredictable. Pt is at elevated risk for inadvertent injury to self/others due to exacerbation and intensity of symptoms and will therefore require admission to inpatient psychiatry at this time.     PATIENT DOES NOT HAVE CAPACITY TO LEAVE AMA

## 2022-08-12 NOTE — ED BEHAVIORAL HEALTH ASSESSMENT NOTE - DESCRIPTION
During course of ED visit patient was calm and cooperative. Patient was not aggressive or violent and did not require PRN medications.    ICU Vital Signs Last 24 Hrs  T(C): 36.7 (12 Aug 2022 19:19), Max: 37.1 (12 Aug 2022 13:17)  T(F): 98 (12 Aug 2022 19:19), Max: 98.7 (12 Aug 2022 13:17)  HR: 57 (12 Aug 2022 19:19) (54 - 57)  BP: 143/94 (12 Aug 2022 19:19) (119/66 - 143/94)  BP(mean): --  ABP: --  ABP(mean): --  RR: 16 (12 Aug 2022 19:19) (16 - 17)  SpO2: 100% (12 Aug 2022 19:19) (98% - 100%)    O2 Parameters below as of 12 Aug 2022 19:19  Patient On (Oxygen Delivery Method): room air 51 year old male, lives with brother, , has a daughter, came to [place] from [place] in 1980[place] denies - recent falls (see hpi)

## 2022-08-12 NOTE — ED ADULT NURSE NOTE - CHIEF COMPLAINT QUOTE
From [facility] crisis center sent for SI.  Family brought pt to crisis center for pt not taking meds and acting out.  Not sleeping.  Admitted to [facility] after a fall on Saturday.  Healed lacerations to left face.  Has had multiple falls recently.  Hx bipolar disorder.

## 2022-08-12 NOTE — ED BEHAVIORAL HEALTH ASSESSMENT NOTE - OTHER PAST PSYCHIATRIC HISTORY (INCLUDE DETAILS REGARDING ONSET, COURSE OF ILLNESS, INPATIENT/OUTPATIENT TREATMENT)
hx of Bipolar Disorder, hx of multiple  inpatient psychiatric hospitalizations last in 2014, hx of two SAs (overdose, and aborted attempted related to bridge), hx of gambling. Outpatient tx with Dr. Narvaez

## 2022-08-12 NOTE — ED ADULT NURSE REASSESSMENT NOTE - NS ED NURSE REASSESS COMMENT FT1
Pt started on CO @ 21:30, flowsheet started, ED Tech @ bedside, safety maintained, belongings sent home w/family, phone remains w/ pt, will continue to monitor,
Report and pt received from AYESHA Espinoza, pt A&Ox4, respirations even and unlabored. Pt denies any HI or SI. NAD noted. Pt offers no complaints. Pending urine results. bed in lowest position, side rails up, call bell in hand, safety maintained.
No sx of acute distress, pt pending Psych MD gramajo, placed into room, family @ bedside, will continue to monitor.

## 2022-08-12 NOTE — ED PROVIDER NOTE - PROGRESS NOTE DETAILS
David Otto, DO PGY-3: No significant findings to explain sowmya or possible syncope on labs, psych consulted to evaluate for possible hold for further medical treatment. David Otto, DO PGY-3: Pt evaluated by psychiatry, pt unable to show decision making capacity w/ regard to recent falls. Pt agrees to stay for further testing w/ repeated syncope, and psychiatry agrees that pt is to stay for involuntary medical admission

## 2022-08-12 NOTE — ED BEHAVIORAL HEALTH ASSESSMENT NOTE - CURRENT MEDICATION
Abilify 5mg qhs, Mirtazapine 30mg qhs, Seroquel, 300mg qhs, Zoloft 100mg 2 tablets daily.     Patient admits to non compliance- stating he will go several days at a time and not take his medication.
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
## 2022-08-12 NOTE — ED ADULT TRIAGE NOTE - CHIEF COMPLAINT QUOTE
From [facility] crisis center sent for SI.  Family brought pt to crisis center for pt not taking meds and acting out.  Not sleeping.  Admitted to [facility] after a fall on Saturday.  Healed lacerations to left face.  Has had multiple falls recently.  Hx bipolar disorder.

## 2022-08-12 NOTE — ED PROVIDER NOTE - NS ED ROS FT
Constitutional:  (-) fever, (-) chills, (-) lethargy  Eyes:  (-) eye pain (-) visual changes  ENMT: (-) nasal discharge, (-) sore throat. (-) neck pain or stiffness  Cardiac: (-) chest pain (-) palpitations  Respiratory:  (-) cough (-) respiratory distress.   GI:  (-) nausea (-) vomiting (-) diarrhea (-) abdominal pain.  :  (-) dysuria (-) frequency (-) burning.  MS:  (-) back pain (-) joint pain.  Neuro:  (-) headache (-) numbness (-) tingling (-) focal weakness  Skin:  (-) rash  Except as documented in the HPI,  all other systems are negative Constitutional:  (-) fever, (-) chills, (-) lethargy  Eyes:  (-) eye pain (-) visual changes  ENMT: (-) nasal discharge, (-) sore throat. (-) neck pain or stiffness  Cardiac: (-) chest pain (-) palpitations  Respiratory:  (-) cough (-) respiratory distress.   GI:  (-) nausea (-) vomiting (-) diarrhea (-) abdominal pain.  :  (-) dysuria (-) frequency (-) burning.  MS:  (-) back pain (-) joint pain.  Neuro:  (-) headache (-) numbness (-) tingling (-) focal weakness  Skin:  (-) rash + laceration + syncope  Except as documented in the HPI,  all other systems are negative

## 2022-08-12 NOTE — ED PROVIDER NOTE - CLINICAL SUMMARY MEDICAL DECISION MAKING FREE TEXT BOX
Bipolar I w/ hx of multiple falls brought in by family for psychiatric evaluation, pt has had medical workups multiple times recently however has repeated signed out AMA and pt displays capacity, ct head for falls, ekg, labs, likely psych consult and disposition accordingly

## 2022-08-12 NOTE — ED BEHAVIORAL HEALTH ASSESSMENT NOTE - RISK ASSESSMENT
Low Acute Suicide Risk modifiable risk factors- manic type symptoms, erratic behavior, irritability, non compliance with medication     unmodifiable risk factors- history of inpatient admissions

## 2022-08-12 NOTE — ED PROVIDER NOTE - PHYSICAL EXAMINATION
CONSTITUTIONAL: well-appearing, in NAD  SKIN: several repaired sutured  HEAD: NCAT  EYES: EOMI, PERRLA, no scleral icterus, conjunctiva pink  NECK: Supple; non tender. Full ROM.  CARD: RRR, no murmurs.  RESP: clear to ausculation b/l. No crackles or wheezing.  ABD: soft, non-tender, non-distended, no rebound or guarding.  MSK: Full ROM, no bony tenderness, no pedal edema, no calf tenderness  NEURO: normal motor. normal sensory. CN II-XII intact. Cerebellar testing normal. Normal gait.  PSYCH: Cooperative, appropriate. CONSTITUTIONAL: well-appearing, in NAD  SKIN: several repaired sutured  HEAD: NCAT  EYES: EOMI, PERRLA, no scleral icterus, conjunctiva pink  NECK: Supple; non tender. Full ROM.  CARD: RRR, no murmurs.  RESP: clear to ausculation b/l. No crackles or wheezing.  ABD: soft, non-tender, non-distended, no rebound or guarding.  MSK: Full ROM, no bony tenderness, no pedal edema, no calf tenderness  NEURO: normal motor. normal sensory. CN II-XII intact. Cerebellar testing normal. Normal gait.  PSYCH: Cooperative, anxious, possible sowmya

## 2022-08-12 NOTE — ED PROVIDER NOTE - ATTENDING CONTRIBUTION TO CARE
I performed a face to face evaluation of this patient and performed a full history and physical examination on the patient.  I agree with the resident's history, physical examination, and plan of the patient.  Bipolar I w/ hx of multiple falls brought in by family for psychiatric evaluation, pt has had medical workups multiple times recently however has repeated signed out AMA and pt displays capacity, ct head for falls, ekg, labs, likely psych consult and disposition accordingly  Pt with healing laceration face- sutured, neck nontender, CV, neuro exam wnl.  Psych with anxiety, no si, hi, ah, vh, possibly manic in nature.  Will need syncope workup and also psych followup

## 2022-08-12 NOTE — ED ADULT NURSE NOTE - OBJECTIVE STATEMENT
Pt received to spot 5a A&OX4 ambulatory c/o multiple falls. Hx Bipolar. As per family at bedside, Pt showing worsening erratic behavior. Pt AMA from [facility] yesterday. Pt states "I passed out. I don't know what happened." Arriving with laceration to forehead. No active bleeding. Pt calm and cooperative at this time. Denies SI/HI. Clothing secured across from room 21 (2 bags). Brother at bedside. MD Otto at bedside. Awaiting further orders.

## 2022-08-12 NOTE — ED BEHAVIORAL HEALTH ASSESSMENT NOTE - NSBHATTESTAPPAMEND_PSY_A_CORE
I have personally seen and examined this patient. I fully participated in the care of this patient. I have made amendments to the documentation where appropriate and otherwise agree with the history, physical exam, and plan as documented by the KENNA

## 2022-08-12 NOTE — ED BEHAVIORAL HEALTH ASSESSMENT NOTE - NSBHATTESTCOMMENTATTENDFT_PSY_A_CORE
The patient is a 52 yo M,  from wife, from [place], recently lost job, with hx of Bipolar Disorder, hx of multiple  inpatient psychiatric hospitalizations last in 2014, hx of two SAs (overdose, and aborted attempted related to bridge), hx of gambling, no substance use disorder, + recent verbally aggressive behavior. + recent multiple legal issues- speeding citations. Currently treated at [facility] since 2014. PMH- 2 recent falls and seen at [facility] ED last on 8/7/22- suture to head and signed out AMA refusing cardiac/telemetry work up. BIBA refereed by psychiatrist for manic behavior.   Patient is acutely manic with irritability, diminished need for sleep and risk taking behavior, spending spree/impulsivity. HE is non compliant with medications; has poor insight and judgment. HE has no capacity for medical decision making. He needs psych admission. CL to f/u if patient goes to medical floor.

## 2022-08-13 DIAGNOSIS — R55 SYNCOPE AND COLLAPSE: ICD-10-CM

## 2022-08-13 DIAGNOSIS — F10.920 ALCOHOL USE, UNSPECIFIED WITH INTOXICATION, UNCOMPLICATED: ICD-10-CM

## 2022-08-13 DIAGNOSIS — Z29.9 ENCOUNTER FOR PROPHYLACTIC MEASURES, UNSPECIFIED: ICD-10-CM

## 2022-08-13 DIAGNOSIS — Z79.899 OTHER LONG TERM (CURRENT) DRUG THERAPY: ICD-10-CM

## 2022-08-13 DIAGNOSIS — M79.89 OTHER SPECIFIED SOFT TISSUE DISORDERS: ICD-10-CM

## 2022-08-13 DIAGNOSIS — F10.988 ALCOHOL USE, UNSPECIFIED WITH OTHER ALCOHOL-INDUCED DISORDER: ICD-10-CM

## 2022-08-13 DIAGNOSIS — R09.89 OTHER SPECIFIED SYMPTOMS AND SIGNS INVOLVING THE CIRCULATORY AND RESPIRATORY SYSTEMS: ICD-10-CM

## 2022-08-13 LAB
A1C WITH ESTIMATED AVERAGE GLUCOSE RESULT: 5.5 % — SIGNIFICANT CHANGE UP (ref 4–5.6)
ALBUMIN SERPL ELPH-MCNC: 3.8 G/DL — SIGNIFICANT CHANGE UP (ref 3.3–5)
ALP SERPL-CCNC: 60 U/L — SIGNIFICANT CHANGE UP (ref 40–120)
ALT FLD-CCNC: 31 U/L — SIGNIFICANT CHANGE UP (ref 4–41)
ANION GAP SERPL CALC-SCNC: 12 MMOL/L — SIGNIFICANT CHANGE UP (ref 7–14)
ANION GAP SERPL CALC-SCNC: 13 MMOL/L — SIGNIFICANT CHANGE UP (ref 7–14)
AST SERPL-CCNC: 26 U/L — SIGNIFICANT CHANGE UP (ref 4–40)
BILIRUB DIRECT SERPL-MCNC: <0.2 MG/DL — SIGNIFICANT CHANGE UP (ref 0–0.3)
BILIRUB INDIRECT FLD-MCNC: >0 MG/DL — SIGNIFICANT CHANGE UP (ref 0–1)
BILIRUB SERPL-MCNC: 0.2 MG/DL — SIGNIFICANT CHANGE UP (ref 0.2–1.2)
BUN SERPL-MCNC: 13 MG/DL — SIGNIFICANT CHANGE UP (ref 7–23)
BUN SERPL-MCNC: 13 MG/DL — SIGNIFICANT CHANGE UP (ref 7–23)
CALCIUM SERPL-MCNC: 8.6 MG/DL — SIGNIFICANT CHANGE UP (ref 8.4–10.5)
CALCIUM SERPL-MCNC: 8.7 MG/DL — SIGNIFICANT CHANGE UP (ref 8.4–10.5)
CHLORIDE SERPL-SCNC: 103 MMOL/L — SIGNIFICANT CHANGE UP (ref 98–107)
CHLORIDE SERPL-SCNC: 104 MMOL/L — SIGNIFICANT CHANGE UP (ref 98–107)
CHOLEST SERPL-MCNC: 187 MG/DL — SIGNIFICANT CHANGE UP
CK MB BLD-MCNC: 3.5 % — HIGH (ref 0–2.5)
CK MB CFR SERPL CALC: 4.1 NG/ML — SIGNIFICANT CHANGE UP
CK SERPL-CCNC: 116 U/L — SIGNIFICANT CHANGE UP (ref 30–200)
CO2 SERPL-SCNC: 21 MMOL/L — LOW (ref 22–31)
CO2 SERPL-SCNC: 22 MMOL/L — SIGNIFICANT CHANGE UP (ref 22–31)
CREAT SERPL-MCNC: 0.77 MG/DL — SIGNIFICANT CHANGE UP (ref 0.5–1.3)
CREAT SERPL-MCNC: 0.79 MG/DL — SIGNIFICANT CHANGE UP (ref 0.5–1.3)
CULTURE RESULTS: SIGNIFICANT CHANGE UP
EGFR: 106 ML/MIN/1.73M2 — SIGNIFICANT CHANGE UP
EGFR: 107 ML/MIN/1.73M2 — SIGNIFICANT CHANGE UP
ESTIMATED AVERAGE GLUCOSE: 111 — SIGNIFICANT CHANGE UP
FOLATE SERPL-MCNC: 12.7 NG/ML — SIGNIFICANT CHANGE UP (ref 3.1–17.5)
GLUCOSE SERPL-MCNC: 95 MG/DL — SIGNIFICANT CHANGE UP (ref 70–99)
GLUCOSE SERPL-MCNC: 96 MG/DL — SIGNIFICANT CHANGE UP (ref 70–99)
HCT VFR BLD CALC: 42.8 % — SIGNIFICANT CHANGE UP (ref 39–50)
HDLC SERPL-MCNC: 38 MG/DL — LOW
HGB BLD-MCNC: 13.8 G/DL — SIGNIFICANT CHANGE UP (ref 13–17)
LIPID PNL WITH DIRECT LDL SERPL: 105 MG/DL — HIGH
MAGNESIUM SERPL-MCNC: 2.1 MG/DL — SIGNIFICANT CHANGE UP (ref 1.6–2.6)
MAGNESIUM SERPL-MCNC: 2.1 MG/DL — SIGNIFICANT CHANGE UP (ref 1.6–2.6)
MCHC RBC-ENTMCNC: 32.2 GM/DL — SIGNIFICANT CHANGE UP (ref 32–36)
MCHC RBC-ENTMCNC: 32.7 PG — SIGNIFICANT CHANGE UP (ref 27–34)
MCV RBC AUTO: 101.4 FL — HIGH (ref 80–100)
NON HDL CHOLESTEROL: 149 MG/DL — HIGH
NRBC # BLD: 0 /100 WBCS — SIGNIFICANT CHANGE UP
NRBC # FLD: 0 K/UL — SIGNIFICANT CHANGE UP
PHOSPHATE SERPL-MCNC: 3.5 MG/DL — SIGNIFICANT CHANGE UP (ref 2.5–4.5)
PHOSPHATE SERPL-MCNC: 3.6 MG/DL — SIGNIFICANT CHANGE UP (ref 2.5–4.5)
PLATELET # BLD AUTO: 187 K/UL — SIGNIFICANT CHANGE UP (ref 150–400)
POTASSIUM SERPL-MCNC: 4.1 MMOL/L — SIGNIFICANT CHANGE UP (ref 3.5–5.3)
POTASSIUM SERPL-MCNC: 4.4 MMOL/L — SIGNIFICANT CHANGE UP (ref 3.5–5.3)
POTASSIUM SERPL-SCNC: 4.1 MMOL/L — SIGNIFICANT CHANGE UP (ref 3.5–5.3)
POTASSIUM SERPL-SCNC: 4.4 MMOL/L — SIGNIFICANT CHANGE UP (ref 3.5–5.3)
PROT SERPL-MCNC: 6.4 G/DL — SIGNIFICANT CHANGE UP (ref 6–8.3)
RBC # BLD: 4.22 M/UL — SIGNIFICANT CHANGE UP (ref 4.2–5.8)
RBC # FLD: 12.3 % — SIGNIFICANT CHANGE UP (ref 10.3–14.5)
SODIUM SERPL-SCNC: 137 MMOL/L — SIGNIFICANT CHANGE UP (ref 135–145)
SODIUM SERPL-SCNC: 138 MMOL/L — SIGNIFICANT CHANGE UP (ref 135–145)
SPECIMEN SOURCE: SIGNIFICANT CHANGE UP
TRIGL SERPL-MCNC: 222 MG/DL — HIGH
TROPONIN T, HIGH SENSITIVITY RESULT: 10 NG/L — SIGNIFICANT CHANGE UP
TSH SERPL-MCNC: 1.24 UIU/ML — SIGNIFICANT CHANGE UP (ref 0.27–4.2)
VIT B12 SERPL-MCNC: 505 PG/ML — SIGNIFICANT CHANGE UP (ref 200–900)
WBC # BLD: 7.03 K/UL — SIGNIFICANT CHANGE UP (ref 3.8–10.5)
WBC # FLD AUTO: 7.03 K/UL — SIGNIFICANT CHANGE UP (ref 3.8–10.5)

## 2022-08-13 PROCEDURE — 71046 X-RAY EXAM CHEST 2 VIEWS: CPT | Mod: 26

## 2022-08-13 PROCEDURE — 99233 SBSQ HOSP IP/OBS HIGH 50: CPT

## 2022-08-13 PROCEDURE — 12345: CPT | Mod: NC

## 2022-08-13 PROCEDURE — 99223 1ST HOSP IP/OBS HIGH 75: CPT

## 2022-08-13 RX ORDER — FOLIC ACID 0.8 MG
1 TABLET ORAL DAILY
Refills: 0 | Status: DISCONTINUED | OUTPATIENT
Start: 2022-08-13 | End: 2022-08-26

## 2022-08-13 RX ORDER — SODIUM CHLORIDE 9 MG/ML
1000 INJECTION INTRAMUSCULAR; INTRAVENOUS; SUBCUTANEOUS
Refills: 0 | Status: DISCONTINUED | OUTPATIENT
Start: 2022-08-13 | End: 2022-08-13

## 2022-08-13 RX ORDER — SODIUM CHLORIDE 9 MG/ML
500 INJECTION INTRAMUSCULAR; INTRAVENOUS; SUBCUTANEOUS
Refills: 0 | Status: COMPLETED | OUTPATIENT
Start: 2022-08-13 | End: 2022-08-13

## 2022-08-13 RX ORDER — THIAMINE MONONITRATE (VIT B1) 100 MG
100 TABLET ORAL DAILY
Refills: 0 | Status: COMPLETED | OUTPATIENT
Start: 2022-08-13 | End: 2022-08-15

## 2022-08-13 RX ORDER — ARIPIPRAZOLE 15 MG/1
5 TABLET ORAL DAILY
Refills: 0 | Status: DISCONTINUED | OUTPATIENT
Start: 2022-08-13 | End: 2022-08-15

## 2022-08-13 RX ORDER — HALOPERIDOL DECANOATE 100 MG/ML
5 INJECTION INTRAMUSCULAR EVERY 6 HOURS
Refills: 0 | Status: DISCONTINUED | OUTPATIENT
Start: 2022-08-13 | End: 2022-08-26

## 2022-08-13 RX ORDER — ENOXAPARIN SODIUM 100 MG/ML
40 INJECTION SUBCUTANEOUS EVERY 24 HOURS
Refills: 0 | Status: DISCONTINUED | OUTPATIENT
Start: 2022-08-13 | End: 2022-08-26

## 2022-08-13 RX ADMIN — Medication 1 TABLET(S): at 12:37

## 2022-08-13 RX ADMIN — ARIPIPRAZOLE 5 MILLIGRAM(S): 15 TABLET ORAL at 12:37

## 2022-08-13 RX ADMIN — Medication 2 MILLIGRAM(S): at 14:04

## 2022-08-13 RX ADMIN — ENOXAPARIN SODIUM 40 MILLIGRAM(S): 100 INJECTION SUBCUTANEOUS at 21:52

## 2022-08-13 RX ADMIN — HALOPERIDOL DECANOATE 5 MILLIGRAM(S): 100 INJECTION INTRAMUSCULAR at 05:02

## 2022-08-13 RX ADMIN — Medication 1 MILLIGRAM(S): at 12:37

## 2022-08-13 RX ADMIN — SODIUM CHLORIDE 500 MILLILITER(S): 9 INJECTION INTRAMUSCULAR; INTRAVENOUS; SUBCUTANEOUS at 10:50

## 2022-08-13 RX ADMIN — Medication 100 MILLIGRAM(S): at 12:37

## 2022-08-13 NOTE — H&P ADULT - PROBLEM SELECTOR PLAN 6
VTE with Lovenox 40 mg sub cut daily.  Fall, Aspiration, safety seizure precautions.  , Med  pharmacy, PT eval.

## 2022-08-13 NOTE — H&P ADULT - ASSESSMENT
51M, history of Bipolar, medication non compliance, syncope, admitted for syncope, Bipolar with medication non compliance and ETOH use, LLE swelling.  50yo Male, history of bipolar d/o, medication non compliance, recently admitted for syncope c/b facial laceration a/w recurrent episodes of syncope, and uncontrolled bipolar I d/o and sowmya;

## 2022-08-13 NOTE — PATIENT PROFILE ADULT - FUNCTIONAL ASSESSMENT - BASIC MOBILITY 6.
3-calculated by average/Not able to assess (calculate score using [facility] averaging method)

## 2022-08-13 NOTE — H&P ADULT - NSICDXFAMILYHX_GEN_ALL_CORE_FT
FAMILY HISTORY:  Father  Still living? No  FH: Alzheimers disease, Age at diagnosis: Age Unknown

## 2022-08-13 NOTE — PATIENT PROFILE ADULT - FALL HARM RISK - HARM RISK INTERVENTIONS
Assistance with ambulation/Assistance OOB with selected safe patient handling equipment/Communicate Risk of Fall with Harm to all staff/Discuss with provider need for PT consult/Monitor gait and stability/Reinforce activity limits and safety measures with patient and family/Sit up slowly, dangle for a short time, stand at bedside before walking/Tailored Fall Risk Interventions/Visual Cue: Yellow wristband and red socks/Bed in lowest position, wheels locked, appropriate side rails in place/Call bell, personal items and telephone in reach/Instruct patient to call for assistance before getting out of bed or chair/Non-slip footwear when patient is out of bed/Anchor to call system/Physically safe environment - no spills, clutter or unnecessary equipment/Purposeful Proactive Rounding/Room/bathroom lighting operational, light cord in reach

## 2022-08-13 NOTE — H&P ADULT - PROBLEM SELECTOR PLAN 5
F/U Med history pharmacist to assess current home medications. -requested assistance from [facility] pharmacist to obtain current home medications

## 2022-08-13 NOTE — PHYSICAL THERAPY INITIAL EVALUATION ADULT - PERTINENT HX OF CURRENT PROBLEM, REHAB EVAL
50yo Male, history of bipolar d/o, medication non compliance, recently admitted for syncope c/b facial laceration a/w recurrent episodes of syncope, and uncontrolled bipolar I d/o and sowmya;
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
## 2022-08-13 NOTE — H&P ADULT - CARDIOVASCULAR
regular rate and rhythm/S1 S2 present details… regular rate and rhythm/S1 S2 present/no gallops/bradycardia
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
## 2022-08-13 NOTE — H&P ADULT - PROBLEM SELECTOR PLAN 3
L LE with trace edema.   NT to palpation.  F/U B/L LE duplex to assess for DVT. Alcohol blood = 11  Per BH: substance use- symptom trigger CIWA only.

## 2022-08-13 NOTE — H&P ADULT - NEGATIVE PSYCHIATRIC SYMPTOMS
no visual hallucinations/no auditory hallucinations no suicidal ideation/no visual hallucinations/no auditory hallucinations

## 2022-08-13 NOTE — H&P ADULT - RESPIRATORY
clear to auscultation bilaterally/no wheezes/no rales/no rhonchi/no respiratory distress/no use of accessory muscles/no subcutaneous emphysema/airway patent/breath sounds equal/good air movement/respirations non-labored/no intercostal retractions

## 2022-08-13 NOTE — H&P ADULT - NSHPLABSRESULTS_GEN_ALL_CORE
- CT HEAD No acute intracranial hemorrhage or acute territorial infarction. No change from 8/6/2022.    - Alcohol = 11.  - COVID PCR: Not detected.                          13.7   8.54  )-----------( 192      ( 12 Aug 2022 15:50 )             41.6   08-12    140  |  107  |  15  ----------------------------<  96  4.6   |  23  |  0.91    Ca    9.0      12 Aug 2022 15:50    TPro  6.2  /  Alb  3.9  /  TBili  0.2  /  DBili  x   /  AST  24  /  ALT  29  /  AlkPhos  60  08-12 .  - personally interpreted EKG: sinus bradycardia, 52bpm, qtc 412, IRBBB, no acute Tw or ST changes, no PVC or PACs     - personally reviewed CT BRAIN, 08/12/2022  COMPARISON: 8/6/2022  FINDINGS:  There is no evidence of mass effect, midline shift, acute intracranial hemorrhage, or extra-axial collections.  The ventricular and sulcal size and configuration is age appropriate.  The calvarium is intact. The paranasal sinuses are clear. The mastoid air cells are predominantly clear. The orbits are unremarkable.  IMPRESSION:  No acute intracranial hemorrhage or acute territorial infarction. No change from 8/6/2022    - personally interpreted labs:                        13.7   8.54  )-----------( 192      ( 12 Aug 2022 15:50 )             41.6   08-12    140  |  107  |  15  ----------------------------<  96  4.6   |  23  |  0.91    Ca    9.0      12 Aug 2022 15:50    TPro  6.2  /  Alb  3.9  /  TBili  0.2  /  DBili  x   /  AST  24  /  ALT  29  /  AlkPhos  60  08-12    - Alcohol = 11.  - COVID PCR: Not detected.

## 2022-08-13 NOTE — PROGRESS NOTE ADULT - SUBJECTIVE AND OBJECTIVE BOX
[facility] - Department of Medicine   Maria Ayon DO   Pager: [phone]    SUBJECTIVE / OVERNIGHT EVENTS: Pt seen and examined at bedside. Appears to be manic. Agitated that he has not moved from the ED. Able to redirect, answers questions appropriately. Denies any CP, SOB, palpitations     MEDICATIONS  (STANDING):  ARIPiprazole 5 milliGRAM(s) Oral daily  enoxaparin Injectable 40 milliGRAM(s) SubCutaneous every 24 hours  folic acid 1 milliGRAM(s) Oral daily  multivitamin 1 Tablet(s) Oral daily  thiamine 100 milliGRAM(s) Oral daily    MEDICATIONS  (PRN):  haloperidol     Tablet 5 milliGRAM(s) Oral every 6 hours PRN anxiety/ agitation  LORazepam     Tablet 2 milliGRAM(s) Oral every 2 hours PRN Symptom-triggered 2 point increase in CIWA-Ar  LORazepam   Injectable 2 milliGRAM(s) IV Push every 2 hours PRN Symptom-triggered: each CIWA -Ar score 8 or GREATER      Vital Signs Last 24 Hrs  T(C): 36.6 (13 Aug 2022 18:53), Max: 36.9 (13 Aug 2022 05:09)  T(F): 97.8 (13 Aug 2022 18:53), Max: 98.5 (13 Aug 2022 05:09)  HR: 56 (13 Aug 2022 18:53) (52 - 64)  BP: 119/70 (13 Aug 2022 18:53) (100/60 - 143/94)  BP(mean): --  RR: 17 (13 Aug 2022 18:53) (13 - 18)  SpO2: 96% (13 Aug 2022 18:53) (96% - 100%)    Parameters below as of 13 Aug 2022 18:53  Patient On (Oxygen Delivery Method): room air      CAPILLARY BLOOD GLUCOSE          PHYSICAL EXAM:  GENERAL: appears to be manic, agitated that he is still in the ED   HEAD: multiple healing lacerations, face laceration sutures clean and dry   EYES: EOMI, conjunctiva and sclera clear  NECK: Supple, No JVD  CHEST/LUNG: Clear to auscultation bilaterally; No wheeze  HEART: Regular rate and rhythm; No murmurs, rubs, or gallops  ABDOMEN: Soft, Nontender, Nondistended; Bowel sounds present  EXTREMITIES:  2+ Peripheral Pulses, No clubbing, cyanosis, or edema  PSYCH: AAOx3  NEUROLOGY: non-focal  SKIN: multiple healing bruising on extremities     LABS:                        13.8   7.03  )-----------( 187      ( 13 Aug 2022 06:26 )             42.8     08-    137  |  103  |  13  ----------------------------<  96  4.1   |  22  |  0.77    Ca    8.6      13 Aug 2022 06:26  Phos  3.5     08-  Mg     2.10     -    TPro  6.4  /  Alb  3.8  /  TBili  0.2  /  DBili  <0.2  /  AST  26  /  ALT  31  /  AlkPhos  60  08-13      CARDIAC MARKERS ( 13 Aug 2022 06:26 )  x     / x     / 116 U/L / x     / 4.1 ng/mL      Urinalysis Basic - ( 12 Aug 2022 19:25 )    Color: Light Yellow / Appearance: Clear / S.014 / pH: x  Gluc: x / Ketone: Negative  / Bili: Negative / Urobili: <2 mg/dL   Blood: x / Protein: Negative / Nitrite: Negative   Leuk Esterase: Negative / RBC: x / WBC x   Sq Epi: x / Non Sq Epi: x / Bacteria: x        RADIOLOGY & ADDITIONAL TESTS:

## 2022-08-13 NOTE — H&P ADULT - PROBLEM SELECTOR PLAN 4
Alcohol blood = 11  Per BH: substance use- symptom trigger CIWA only. Lt LE with trace edema.   NT to palpation.  F/U B/L LE duplex to assess for DVT.

## 2022-08-13 NOTE — H&P ADULT - NSHPPHYSICALEXAM_GEN_ALL_CORE
Vital Signs Last 24 Hrs  T(C): 36.9 (13 Aug 2022 05:09), Max: 37.1 (12 Aug 2022 13:17)  T(F): 98.5 (13 Aug 2022 05:09), Max: 98.7 (12 Aug 2022 13:17)  HR: 57 (13 Aug 2022 05:09) (52 - 62)  BP: 103/50 (13 Aug 2022 05:09) (103/50 - 143/94)  BP(mean): --  RR: 17 (13 Aug 2022 05:09) (16 - 18)  SpO2: 99% (13 Aug 2022 05:09) (98% - 100%)    Parameters below as of 13 Aug 2022 05:09  Patient On (Oxygen Delivery Method): room air

## 2022-08-13 NOTE — H&P ADULT - NSHPPOADEEPVENOUSTHROMB_GEN_A_CORE
Patient is here today for Shingrix #1. VIS given. 0.5mL given into right deltoid, see immunization record for details. Patient tolerated injection well and advised to wait in the lobby 10-15 mins to monitor for a reaction. Patient scheduled for second dose.   
suspected

## 2022-08-13 NOTE — PHYSICAL THERAPY INITIAL EVALUATION ADULT - ADDITIONAL COMMENTS
Patient lives alone in first floor room in private home. Patient states he has a few steps to negotiate. Patient was independent in all ADL prior to admission. Patient was not using an assistive device prior to admission.

## 2022-08-13 NOTE — H&P ADULT - NSHPSOCIALHISTORY_GEN_ALL_CORE
.  Lives with family.  Denies history of Nicotine, ETOH, illicit/ recreational drugs.   Unemployed   Colonoscopy: 2016 Normal.  Flu Vax; 2021  COVID Vax X 3.   Lives with family  Denies history of Nicotine, ETOH, illicit/ recreational drugs  Unemployed,   Colonoscopy: 2016 Normal  Flu Vax; 2021  COVID Vax X 3.

## 2022-08-13 NOTE — H&P ADULT - SKIN COMMENTS
Laceration to L lateral orbit, sutures in place. Superficial laceration to L nose nasolabial sulcus. Ecchymosis to R knee. Healed scar to L knee and old healed scar to L anterior shin healed laceration to L lateral orbit, sutures in place. Superficial laceration to L nose nasolabial sulcus. Ecchymosis to R knee. Healed scar to L knee and old healed scar to L anterior shin

## 2022-08-13 NOTE — H&P ADULT - MUSCULOSKELETAL
no joint swelling/no joint erythema/no joint warmth/strength 5/5 bilateral upper extremities/strength 5/5 bilateral lower extremities details… normal/no joint swelling/no joint erythema/strength 5/5 bilateral upper extremities/strength 5/5 bilateral lower extremities

## 2022-08-13 NOTE — H&P ADULT - PROBLEM SELECTOR PROBLEM 1
----- Message from Molly Rodas MD sent at 2021 11:45 AM CDT -----  Surgeon:     Date: 38 weeks    Type of Admit: Surgery Admit Inpatient    Procedure Location: L&D    PreOp Dx: Previous  section and desires sterilization    Procedure: Repeat Bipolar disorder

## 2022-08-13 NOTE — CHART NOTE - NSCHARTNOTEFT_GEN_A_CORE
EP consult called at this time. Instructed to proceed with cardiac workup as ordered. No need for intervention at this time. Recall EP following workup.

## 2022-08-13 NOTE — BH CONSULTATION LIAISON PROGRESS NOTE - NSBHFUPINTERVALHXFT_PSY_A_CORE
Chart reviewed. Patient received Ativan 2mg for symptom triggered ciwa today. Patient seen by writer and CL attending, Dr. Fink, patient  sleeping on side, PCA at bedside maintaining 1:1 constant observation, easily arousable to verbal stimuli, calm, cooperative, linear, coherent, patient states he was brought to the hospital because, "I passed out twice," Patient states he is well, he denies suicidal/homicidal ideation, he denies auditory/visual hallucinations. Patient medication compliant.

## 2022-08-13 NOTE — PATIENT PROFILE ADULT - NSPROPTRIGHTSUPPORTPERSON_GEN_A_NUR
GENITOURINARY - ADULT     Maintains or returns to baseline urinary function Progressing     Absence of urinary retention Progressing        Nutrition/Hydration-ADULT     Nutrient/Hydration intake appropriate for improving, restoring or maintaining nutritional needs Progressing        Potential for Falls     Patient will remain free of falls Progressing declines

## 2022-08-13 NOTE — PROGRESS NOTE ADULT - PROBLEM SELECTOR PLAN 5
-requested assistance from [facility] pharmacist to obtain current home medications

## 2022-08-13 NOTE — H&P ADULT - PROBLEM SELECTOR PLAN 1
consult appreciated.   - Pt does not have capacity to leave AMA.   - Patient shows poor insight and judgement and is impulsive and unpredictable. Pt is at elevated risk for inadvertent injury to self/others due to exacerbation and intensity of symptoms and will therefore require admission to inpatient psychiatry at this time.   - 1 to 1 for elopement risk.   - Patient has been non complaint with medication: Restart Abilify 5mg QHS (consider CONLEY), Haldol 5mg PO/IM PRN.   - CL to follow. Seems manic with constricted affect (euphoric), hyperactive, poor insight to own medical problems, pressured rate of speech;   consult appreciated.   - Pt does not have capacity to leave AMA  - 1:1 monitoring  for elopement risk.   Pt is at elevated risk for inadvertent injury to self/others due to exacerbation and intensity of symptoms and will therefore require admission to inpatient psychiatry at this time.   - Patient has been non complaint with medication  - Restart Abilify 5mg QHS (consider CONLEY), Haldol 5mg PO/IM PRN.   - CL to follow.

## 2022-08-13 NOTE — H&P ADULT - PROBLEM SELECTOR PLAN 2
Pt denying dizziness, trip and falls.   Cardiac monitor.  F/U CE, TSH,, TTE.  Check orthostatics when the pt is in a room.   Fall, seizure precautions. Pt denying dizziness, trip and falls.   Cardiac monitor.  F/U CE, TSH,, TTE, Carotid duplex.   Check orthostatics when the pt is in a room.   Fall, seizure precautions. Unclear etiology, however, given sudden onset of episodes c/b trauma c/f cardiac arrhythmia vs CA stenosis;   Unclear if triggered by a medication;   - EP c/s for ILR given 3 episodes of LOC c/b facial trauma (poor candidate for Holter monitor given uncontrolled psych d/o)  - EKG: QTc wnl  - Cardiac monitoring   - F/U CE, TSH, TTE, Carotid duplex  - Check orthostatics q6h  x2 checks (when in a room)  - Fall, seizure precautions

## 2022-08-13 NOTE — H&P ADULT - NEGATIVE NEUROLOGICAL SYMPTOMS
no weakness/no paresthesias/no generalized seizures/no focal seizures/no tremors/no vertigo/no difficulty walking/no headache/no hemiparesis/no confusion/no facial palsy

## 2022-08-14 LAB
ANION GAP SERPL CALC-SCNC: 13 MMOL/L — SIGNIFICANT CHANGE UP (ref 7–14)
BUN SERPL-MCNC: 23 MG/DL — SIGNIFICANT CHANGE UP (ref 7–23)
CALCIUM SERPL-MCNC: 8.7 MG/DL — SIGNIFICANT CHANGE UP (ref 8.4–10.5)
CHLORIDE SERPL-SCNC: 102 MMOL/L — SIGNIFICANT CHANGE UP (ref 98–107)
CO2 SERPL-SCNC: 22 MMOL/L — SIGNIFICANT CHANGE UP (ref 22–31)
CREAT SERPL-MCNC: 0.83 MG/DL — SIGNIFICANT CHANGE UP (ref 0.5–1.3)
EGFR: 105 ML/MIN/1.73M2 — SIGNIFICANT CHANGE UP
GLUCOSE SERPL-MCNC: 98 MG/DL — SIGNIFICANT CHANGE UP (ref 70–99)
HCT VFR BLD CALC: 45 % — SIGNIFICANT CHANGE UP (ref 39–50)
HGB BLD-MCNC: 14.7 G/DL — SIGNIFICANT CHANGE UP (ref 13–17)
MAGNESIUM SERPL-MCNC: 2 MG/DL — SIGNIFICANT CHANGE UP (ref 1.6–2.6)
MCHC RBC-ENTMCNC: 32.7 GM/DL — SIGNIFICANT CHANGE UP (ref 32–36)
MCHC RBC-ENTMCNC: 33.4 PG — SIGNIFICANT CHANGE UP (ref 27–34)
MCV RBC AUTO: 102.3 FL — HIGH (ref 80–100)
NRBC # BLD: 0 /100 WBCS — SIGNIFICANT CHANGE UP
NRBC # FLD: 0 K/UL — SIGNIFICANT CHANGE UP
PHOSPHATE SERPL-MCNC: 3.5 MG/DL — SIGNIFICANT CHANGE UP (ref 2.5–4.5)
PLATELET # BLD AUTO: 196 K/UL — SIGNIFICANT CHANGE UP (ref 150–400)
POTASSIUM SERPL-MCNC: 4.3 MMOL/L — SIGNIFICANT CHANGE UP (ref 3.5–5.3)
POTASSIUM SERPL-SCNC: 4.3 MMOL/L — SIGNIFICANT CHANGE UP (ref 3.5–5.3)
RBC # BLD: 4.4 M/UL — SIGNIFICANT CHANGE UP (ref 4.2–5.8)
RBC # FLD: 12.3 % — SIGNIFICANT CHANGE UP (ref 10.3–14.5)
SODIUM SERPL-SCNC: 137 MMOL/L — SIGNIFICANT CHANGE UP (ref 135–145)
WBC # BLD: 7.93 K/UL — SIGNIFICANT CHANGE UP (ref 3.8–10.5)
WBC # FLD AUTO: 7.93 K/UL — SIGNIFICANT CHANGE UP (ref 3.8–10.5)

## 2022-08-14 PROCEDURE — 93970 EXTREMITY STUDY: CPT | Mod: 26

## 2022-08-14 PROCEDURE — 99232 SBSQ HOSP IP/OBS MODERATE 35: CPT

## 2022-08-14 PROCEDURE — 93880 EXTRACRANIAL BILAT STUDY: CPT | Mod: 26

## 2022-08-14 PROCEDURE — 93306 TTE W/DOPPLER COMPLETE: CPT | Mod: 26

## 2022-08-14 PROCEDURE — 93010 ELECTROCARDIOGRAM REPORT: CPT

## 2022-08-14 RX ADMIN — Medication 2 MILLIGRAM(S): at 08:59

## 2022-08-14 RX ADMIN — Medication 2 MILLIGRAM(S): at 22:37

## 2022-08-14 RX ADMIN — ARIPIPRAZOLE 5 MILLIGRAM(S): 15 TABLET ORAL at 14:09

## 2022-08-14 RX ADMIN — Medication 1 MILLIGRAM(S): at 14:09

## 2022-08-14 RX ADMIN — Medication 1 TABLET(S): at 14:09

## 2022-08-14 RX ADMIN — Medication 100 MILLIGRAM(S): at 14:09

## 2022-08-14 RX ADMIN — ENOXAPARIN SODIUM 40 MILLIGRAM(S): 100 INJECTION SUBCUTANEOUS at 21:30

## 2022-08-14 NOTE — BH CONSULTATION LIAISON PROGRESS NOTE - NSBHFUPINTERVALHXFT_PSY_A_CORE
Chart reviewed, no acute events overnight, received symptom-triggered Ativan PRN per DANIE.     In interview, patient is sleepy and minimally engaged. He reports that he prefers the staff at [facility] compared to here, but that the people here are trying their best. Denies SIIP/HIIP, denies AVH, denies paranoid ideation. No tremors or diaphoresis on exam.

## 2022-08-14 NOTE — BH CONSULTATION LIAISON PROGRESS NOTE - NSBHASSESSMENTFT_PSY_ALL_CORE
50 yo male,  from wife, from [place], recently lost job, with PMHx 2 recent falls, PPHx  Bipolar Disorder, hx of multiple inpatient psychiatric hospitalizations- last in 2014, hx of two SAs (overdose, and aborted attempted related to bridge), hx of gambling, no substance use disorder, + recent verbally aggressive behavior. + recent multiple legal issues- speeding citations. Currently treated [facility] since [facility]. Patient recently seen at [facility] ED last on 8/7/22- suture to head and signed out AMA refusing cardiac/telemetry work up. BIBA refereed by psychiatrist for manic behavior.     Patient presents to the ED in the context of manic behavior. Patient has been non complaint with medication and although he has a history of a gambling problem- he has not been sleeping, gambling issues have worsened, he is acting erratically and shows poor insight into both psychiatric and medication issues - leaving AMA refusing medical work ups for syncopal episodes. Patient shows poor insight and judgement and is impulsive and unpredictable. Pt is at elevated risk for inadvertent injury to self/others due to exacerbation and intensity of symptoms and will therefore require admission to inpatient psychiatry at this time.    8/13: patient seen as follow up today, a&o, calm, cooperative, no behavioral issues reported by staff, patient denies suicidal/homicidal ideation, denies auditory/visual hallucinations:  8/14: lethargic but easily arousal, no acute events, denies SI/HI     PLAN:  -c/w 1:1 constant observation given elopement risk, s/s sowmya  -c/w Abilify 5mg daily  -c/w Haldol 5mg q6h PO/Im/IV q6h prn for agitation  -Monitor EKG qtc interval and hold above antipsychotics if qtc >50  -CIWA and ATivan PRN as per primary team  -CL to follow  -Patient CANNOT leave AMA 50 yo male,  from wife, from [place], recently lost job, with PMHx 2 recent falls, PPHx  Bipolar Disorder, hx of multiple inpatient psychiatric hospitalizations- last in 2014, hx of two SAs (overdose, and aborted attempted related to bridge), hx of gambling, no substance use disorder, + recent verbally aggressive behavior. + recent multiple legal issues- speeding citations. Currently treated at [facility] since 2014. Patient recently seen at [facility] ED last on 8/7/22- suture to head and signed out AMA refusing cardiac/telemetry work up. BIBA refereed by psychiatrist for manic behavior.     Patient presents to the ED in the context of manic behavior. Patient has been non complaint with medication and although he has a history of a gambling problem- he has not been sleeping, gambling issues have worsened, he is acting erratically and shows poor insight into both psychiatric and medication issues - leaving AMA refusing medical work ups for syncopal episodes. Patient shows poor insight and judgement and is impulsive and unpredictable. Pt is at elevated risk for inadvertent injury to self/others due to exacerbation and intensity of symptoms and will therefore require admission to inpatient psychiatry at this time.    8/13: patient seen as follow up today, a&o, calm, cooperative, no behavioral issues reported by staff, patient denies suicidal/homicidal ideation, denies auditory/visual hallucinations:  8/14: lethargic but easily arousal, no acute events, denies SI/HI     PLAN:  -c/w 1:1 constant observation given elopement risk, s/s sowmya  -c/w Abilify 5mg daily  -c/w Haldol 5mg q6h PO/Im/IV q6h prn for agitation  -Monitor EKG qtc interval and hold above antipsychotics if qtc >500  -CIWA and ATivan PRN as per primary team  -CL to follow  -Patient CANNOT leave AMA

## 2022-08-14 NOTE — PHARMACOTHERAPY INTERVENTION NOTE - COMMENTS
Medication history is complete. Medication list updated in Outpatient Medication Record (OMR). Please call spectra [phone] if you have any questions.

## 2022-08-14 NOTE — PROGRESS NOTE ADULT - SUBJECTIVE AND OBJECTIVE BOX
[facility] Hospitalist - Department of Medicine   Melodygenijeny AyonDO   Pager: [phone]    SUBJECTIVE / OVERNIGHT EVENTS: Pt seen and examined this AM. Denies any complaints. Denies CP, palpitations, SOB. Patient is sleeping, easily aroused on name calling. Given PO ativan prior to examination.     MEDICATIONS  (STANDING):  ARIPiprazole 5 milliGRAM(s) Oral daily  enoxaparin Injectable 40 milliGRAM(s) SubCutaneous every 24 hours  folic acid 1 milliGRAM(s) Oral daily  multivitamin 1 Tablet(s) Oral daily  thiamine 100 milliGRAM(s) Oral daily    MEDICATIONS  (PRN):  haloperidol     Tablet 5 milliGRAM(s) Oral every 6 hours PRN anxiety/ agitation  LORazepam     Tablet 2 milliGRAM(s) Oral every 2 hours PRN Symptom-triggered 2 point increase in CIWA-Ar  LORazepam   Injectable 2 milliGRAM(s) IV Push every 2 hours PRN Symptom-triggered: each CIWA -Ar score 8 or GREATER      Vital Signs Last 24 Hrs  T(C): 36.7 (14 Aug 2022 18:01), Max: 36.9 (14 Aug 2022 13:23)  T(F): 98 (14 Aug 2022 18:01), Max: 98.5 (14 Aug 2022 13:23)  HR: 65 (14 Aug 2022 18:01) (60 - 70)  BP: 120/83 (14 Aug 2022 18:01) (113/77 - 129/82)  BP(mean): --  RR: 18 (14 Aug 2022 18:01) (18 - 20)  SpO2: 96% (14 Aug 2022 18:01) (96% - 99%)    Parameters below as of 14 Aug 2022 18:01  Patient On (Oxygen Delivery Method): room air      CAPILLARY BLOOD GLUCOSE          PHYSICAL EXAM:  GENERAL: calmly sleeping, easily aroused, answers questions appropriately   HEAD: multiple healing lacerations, face laceration sutures clean and dry   EYES: EOMI, conjunctiva and sclera clear  NECK: Supple, No JVD  CHEST/LUNG: Clear to auscultation bilaterally; No wheeze  HEART: Regular rate and rhythm; No murmurs, rubs, or gallops  ABDOMEN: Soft, Nontender, Nondistended; Bowel sounds present  EXTREMITIES:  2+ Peripheral Pulses, No clubbing, cyanosis, or edema  PSYCH: AAOx3  NEUROLOGY: non-focal  SKIN: multiple healing bruising on extremities       LABS:                        14.7   7.93  )-----------( 196      ( 14 Aug 2022 05:20 )             45.0     08-14    137  |  102  |  23  ----------------------------<  98  4.3   |  22  |  0.83    Ca    8.7      14 Aug 2022 05:20  Phos  3.5     08-14  Mg     2.00     08-14    TPro  6.4  /  Alb  3.8  /  TBili  0.2  /  DBili  <0.2  /  AST  26  /  ALT  31  /  AlkPhos  60  08-13      CARDIAC MARKERS ( 13 Aug 2022 06:26 )  x     / x     / 116 U/L / x     / 4.1 ng/mL          RADIOLOGY & ADDITIONAL TESTS:

## 2022-08-15 LAB
AMPHETAMINES UR QL SCN: NEGATIVE NG/ML — SIGNIFICANT CHANGE UP
ANION GAP SERPL CALC-SCNC: 12 MMOL/L — SIGNIFICANT CHANGE UP (ref 7–14)
BARBITURATES, URINE.: NEGATIVE NG/ML — SIGNIFICANT CHANGE UP
BENZODIAZ UR QL: NEGATIVE NG/ML — SIGNIFICANT CHANGE UP
BUN SERPL-MCNC: 21 MG/DL — SIGNIFICANT CHANGE UP (ref 7–23)
BZE UR QL: NEGATIVE NG/ML — SIGNIFICANT CHANGE UP
CALCIUM SERPL-MCNC: 8.9 MG/DL — SIGNIFICANT CHANGE UP (ref 8.4–10.5)
CANNABINOIDS UR QL SCN: NEGATIVE NG/ML — SIGNIFICANT CHANGE UP
CHLORIDE SERPL-SCNC: 103 MMOL/L — SIGNIFICANT CHANGE UP (ref 98–107)
CO2 SERPL-SCNC: 22 MMOL/L — SIGNIFICANT CHANGE UP (ref 22–31)
CREAT SERPL-MCNC: 0.85 MG/DL — SIGNIFICANT CHANGE UP (ref 0.5–1.3)
EGFR: 104 ML/MIN/1.73M2 — SIGNIFICANT CHANGE UP
ETHANOL UR-MCNC: NEGATIVE % — SIGNIFICANT CHANGE UP
GLUCOSE SERPL-MCNC: 123 MG/DL — HIGH (ref 70–99)
HCT VFR BLD CALC: 45.9 % — SIGNIFICANT CHANGE UP (ref 39–50)
HGB BLD-MCNC: 14.9 G/DL — SIGNIFICANT CHANGE UP (ref 13–17)
MAGNESIUM SERPL-MCNC: 2.1 MG/DL — SIGNIFICANT CHANGE UP (ref 1.6–2.6)
MCHC RBC-ENTMCNC: 32.5 GM/DL — SIGNIFICANT CHANGE UP (ref 32–36)
MCHC RBC-ENTMCNC: 32.7 PG — SIGNIFICANT CHANGE UP (ref 27–34)
MCV RBC AUTO: 100.7 FL — HIGH (ref 80–100)
METHADONE UR QL SCN: NEGATIVE NG/ML — SIGNIFICANT CHANGE UP
METHAQUALONE UR QL: NEGATIVE NG/ML — SIGNIFICANT CHANGE UP
METHAQUALONE UR-MCNC: NEGATIVE NG/ML — SIGNIFICANT CHANGE UP
NRBC # BLD: 0 /100 WBCS — SIGNIFICANT CHANGE UP
NRBC # FLD: 0 K/UL — SIGNIFICANT CHANGE UP
OPIATES UR QL: NEGATIVE — SIGNIFICANT CHANGE UP
OPIATES UR QL: SIGNIFICANT CHANGE UP NG/ML
PCP UR QL: NEGATIVE NG/ML — SIGNIFICANT CHANGE UP
PHOSPHATE SERPL-MCNC: 3.9 MG/DL — SIGNIFICANT CHANGE UP (ref 2.5–4.5)
PLATELET # BLD AUTO: 221 K/UL — SIGNIFICANT CHANGE UP (ref 150–400)
POTASSIUM SERPL-MCNC: 3.9 MMOL/L — SIGNIFICANT CHANGE UP (ref 3.5–5.3)
POTASSIUM SERPL-SCNC: 3.9 MMOL/L — SIGNIFICANT CHANGE UP (ref 3.5–5.3)
PROPOXYPH UR QL: NEGATIVE NG/ML — SIGNIFICANT CHANGE UP
RBC # BLD: 4.56 M/UL — SIGNIFICANT CHANGE UP (ref 4.2–5.8)
RBC # FLD: 12.2 % — SIGNIFICANT CHANGE UP (ref 10.3–14.5)
SODIUM SERPL-SCNC: 137 MMOL/L — SIGNIFICANT CHANGE UP (ref 135–145)
WBC # BLD: 7.59 K/UL — SIGNIFICANT CHANGE UP (ref 3.8–10.5)
WBC # FLD AUTO: 7.59 K/UL — SIGNIFICANT CHANGE UP (ref 3.8–10.5)

## 2022-08-15 PROCEDURE — 99232 SBSQ HOSP IP/OBS MODERATE 35: CPT

## 2022-08-15 PROCEDURE — 99233 SBSQ HOSP IP/OBS HIGH 50: CPT

## 2022-08-15 RX ORDER — ARIPIPRAZOLE 15 MG/1
0 TABLET ORAL
Qty: 0 | Refills: 0 | DISCHARGE

## 2022-08-15 RX ORDER — MIRTAZAPINE 45 MG/1
0 TABLET, ORALLY DISINTEGRATING ORAL
Qty: 0 | Refills: 0 | DISCHARGE

## 2022-08-15 RX ORDER — ARIPIPRAZOLE 15 MG/1
10 TABLET ORAL DAILY
Refills: 0 | Status: DISCONTINUED | OUTPATIENT
Start: 2022-08-15 | End: 2022-08-20

## 2022-08-15 RX ORDER — CLONAZEPAM 1 MG
0 TABLET ORAL
Qty: 0 | Refills: 0 | DISCHARGE

## 2022-08-15 RX ORDER — QUETIAPINE FUMARATE 200 MG/1
0 TABLET, FILM COATED ORAL
Qty: 0 | Refills: 0 | DISCHARGE

## 2022-08-15 RX ORDER — TAMSULOSIN HYDROCHLORIDE 0.4 MG/1
0 CAPSULE ORAL
Qty: 0 | Refills: 0 | DISCHARGE

## 2022-08-15 RX ORDER — HALOBETASOL PROPIONATE 0.5 MG/G
0 OINTMENT TOPICAL
Qty: 0 | Refills: 0 | DISCHARGE

## 2022-08-15 RX ORDER — SERTRALINE 25 MG/1
0 TABLET, FILM COATED ORAL
Qty: 0 | Refills: 0 | DISCHARGE

## 2022-08-15 RX ADMIN — ARIPIPRAZOLE 5 MILLIGRAM(S): 15 TABLET ORAL at 11:48

## 2022-08-15 RX ADMIN — Medication 1 MILLIGRAM(S): at 11:48

## 2022-08-15 RX ADMIN — Medication 100 MILLIGRAM(S): at 11:48

## 2022-08-15 RX ADMIN — Medication 1 TABLET(S): at 11:48

## 2022-08-15 RX ADMIN — ENOXAPARIN SODIUM 40 MILLIGRAM(S): 100 INJECTION SUBCUTANEOUS at 21:08

## 2022-08-15 NOTE — BH CONSULTATION LIAISON PROGRESS NOTE - NSBHFUPINTERVALHXFT_PSY_A_CORE
Chart reviewed. VSS. CIWAs ranged from 0-2. Patient received 2 2mg PO Ativan for symptom triggered CIWA.    Patient states that he is doing alright, says that he was passing out at home, unclear why. He says he has Bipolar 2, and is seeing a psychiatrist and takes Abilify 5 and Remeron 30. He reports adequate sleep, good mood, no SI/HI, AVH, or paranoia.  Chart reviewed. VSS. CIWAs ranged from 0-2. Patient received 2mg PO Ativan for symptom triggered CIWA.    Patient states that he is doing alright, says that he was passing out at home, unclear why. He says he has Bipolar 2, and is seeing a psychiatrist and takes Abilify 5 and Remeron 30. He reports adequate sleep, good mood, no SI/HI, AVH, or paranoia.

## 2022-08-15 NOTE — PROGRESS NOTE ADULT - ASSESSMENT
52yo Male, history of bipolar d/o, medication non compliance, recently admitted for syncope c/b facial laceration a/w recurrent episodes of syncope, and uncontrolled bipolar I d/o and sowmya; consult cards 8/15, syncopal work up thus far negative.

## 2022-08-15 NOTE — BH CONSULTATION LIAISON PROGRESS NOTE - NSBHASSESSMENTFT_PSY_ALL_CORE
50 yo male,  from wife, from [place], recently lost job, with PMHx 2 recent falls, PPHx  Bipolar Disorder, hx of multiple inpatient psychiatric hospitalizations- last in 2014, hx of two SAs (overdose, and aborted attempted related to bridge), hx of gambling, no substance use disorder, + recent verbally aggressive behavior. + recent multiple legal issues- speeding citations. Currently treated at [facility] since 2014. Patient recently seen at [facility] ED last on 8/7/22- suture to head and signed out AMA refusing cardiac/telemetry work up. BIBA refereed by psychiatrist for manic behavior.   Patient presents to the ED in the context of manic behavior. Patient has been non complaint with medication and although he has a history of a gambling problem- he has not been sleeping, gambling issues have worsened, he is acting erratically and shows poor insight into both psychiatric and medication issues - leaving AMA refusing medical work ups for syncopal episodes. Patient shows poor insight and judgement and is impulsive and unpredictable. Pt is at elevated risk for inadvertent injury to self/others due to exacerbation and intensity of symptoms and will therefore require admission to inpatient psychiatry at this time.    8/13: patient seen as follow up today, a&o, calm, cooperative, no behavioral issues reported by staff, patient denies suicidal/homicidal ideation, denies auditory/visual hallucinations:  8/14: lethargic but easily arousal, no acute events, denies SI/HI   8/15: Patient awake and alert. CIWAs 0-2. No PRNs for agitation. Patient calm, cooperative.    PLAN:  -c/w 1:1 constant observation given elopement risk, s/s sowmya  -INCREASE Abilify to 10mg daily  -c/w Haldol 5mg q6h PO/Im/IV q6h prn for agitation  -Monitor EKG qtc interval and hold above antipsychotics if qtc >500  -CIWA and ATivan PRN as per primary team  -CL to follow  -Patient CANNOT leave AMA 52 yo male,  from wife, from [place], recently lost job, with PMHx 2 recent falls, PPHx  Bipolar Disorder, hx of multiple inpatient psychiatric hospitalizations- last in 2014, hx of two SAs (overdose, and aborted attempted related to bridge), hx of gambling, no substance use disorder, + recent verbally aggressive behavior. + recent multiple legal issues- speeding citations. Currently treated at [facility] since 2014. Patient recently seen at [facility] ED last on 8/7/22- suture to head and signed out AMA refusing cardiac/telemetry work up. BIBA refereed by psychiatrist for manic behavior.   Patient presents to the ED in the context of manic behavior. Patient has been non complaint with medication and although he has a history of a gambling problem- he has not been sleeping, gambling issues have worsened, he is acting erratically and shows poor insight into both psychiatric and medication issues - leaving AMA refusing medical work ups for syncopal episodes. Patient shows poor insight and judgement and is impulsive and unpredictable. Pt is at elevated risk for inadvertent injury to self/others due to exacerbation and intensity of symptoms and will therefore require admission to inpatient psychiatry at this time.    8/13: patient seen as follow up today, a&o, calm, cooperative, no behavioral issues reported by staff, patient denies suicidal/homicidal ideation, denies auditory/visual hallucinations:  8/14: lethargic but easily arousal, no acute events, denies SI/HI   8/15: Patient awake and alert. CIWAs 0-2. No PRNs for agitation. Patient calm, cooperative. No current overt signs of sowmya, no SI/HI, AVH.    PLAN:  -c/w 1:1 constant observation given elopement risk, s/s sowmya  -INCREASE Abilify to 10mg daily  -c/w Haldol 5mg q6h PO/Im/IV q6h prn for agitation  -Monitor EKG qtc interval and hold above antipsychotics if qtc >500  -CIWA and ATivan PRN as per primary team  -CL to follow  -Patient CANNOT leave AMA

## 2022-08-15 NOTE — PROGRESS NOTE ADULT - SUBJECTIVE AND OBJECTIVE BOX
[facility] Division of Hospital Medicine  Meghann Schwarz MD  Pager (ROBERT-EKELY, 8A-5P): [phone]  Other Times:  [phone]      SUBJECTIVE / OVERNIGHT EVENTS: Pt wanting to leave. Says "I feel fine." No events on telemetry reviewed from overnight.    MEDICATIONS  (STANDING):  ARIPiprazole 10 milliGRAM(s) Oral daily  enoxaparin Injectable 40 milliGRAM(s) SubCutaneous every 24 hours  folic acid 1 milliGRAM(s) Oral daily  multivitamin 1 Tablet(s) Oral daily    MEDICATIONS  (PRN):  haloperidol     Tablet 5 milliGRAM(s) Oral every 6 hours PRN anxiety/ agitation  LORazepam     Tablet 2 milliGRAM(s) Oral every 2 hours PRN Symptom-triggered 2 point increase in CIWA-Ar  LORazepam   Injectable 2 milliGRAM(s) IV Push every 2 hours PRN Symptom-triggered: each CIWA -Ar score 8 or GREATER      I&O's Summary      PHYSICAL EXAM:  Vital Signs Last 24 Hrs  T(C): 36.4 (15 Aug 2022 12:00), Max: 36.7 (14 Aug 2022 18:01)  T(F): 97.5 (15 Aug 2022 12:00), Max: 98 (14 Aug 2022 18:01)  HR: 66 (15 Aug 2022 12:00) (65 - 74)  BP: 131/81 (15 Aug 2022 12:00) (117/78 - 131/81)  BP(mean): --  RR: 17 (15 Aug 2022 12:00) (17 - 18)  SpO2: 98% (15 Aug 2022 12:00) (96% - 99%)    Parameters below as of 15 Aug 2022 12:00  Patient On (Oxygen Delivery Method): room air      GENERAL: calmly sleeping, easily aroused, answers questions appropriately   HEAD: multiple healing lacerations, face laceration sutures clean and dry   EYES: EOMI, conjunctiva and sclera clear  NECK: Supple, No JVD  CHEST/LUNG: Clear to auscultation bilaterally; No wheeze  HEART: Regular rate and rhythm; No murmurs, rubs, or gallops  ABDOMEN: Soft, Nontender, Nondistended; Bowel sounds present  EXTREMITIES:  2+ Peripheral Pulses, No clubbing, cyanosis, or edema  PSYCH: AAOx3  NEUROLOGY: non-focal  SKIN: multiple healing bruising on extremities     LABS:                        14.9   7.59  )-----------( 221      ( 15 Aug 2022 05:20 )             45.9     08-15    137  |  103  |  21  ----------------------------<  123<H>  3.9   |  22  |  0.85    Ca    8.9      15 Aug 2022 05:20  Phos  3.9     08-15  Mg     2.10     08-15                  RADIOLOGY & ADDITIONAL TESTS:  Results Reviewed:   Imaging Personally Reviewed:  Electrocardiogram Personally Reviewed:    COORDINATION OF CARE:  Care Discussed with Consultants/Other Providers [Y/N]:Cards  Prior or Outpatient Records Reviewed [Y/N]:

## 2022-08-15 NOTE — PHARMACOTHERAPY INTERVENTION NOTE - COMMENTS
Medication history is incomplete. Unable to verify patient's medication list, patient non-compliant with home medications. Medication list updated based on fill information provided by outpatient pharmacy (Filled dates listed in OMR for reference).    Medication history is saved as incomplete. Unable to verify patient's medication list, patient non-compliant with home medications.   Medication list updated based on fill information provided by outpatient pharmacy ([facility]), last filled dates listed in OMR for reference.

## 2022-08-15 NOTE — BH CONSULTATION LIAISON PROGRESS NOTE - NSBHATTESTCOMMENTATTENDFT_PSY_A_CORE
Chart reviewed, pt. seen/evaluated with trainee, I agree with above assessment/plan. Patient oriented, seems withdrawn, denies current SI and HI. Plan as above, will follow
Chart reviewed, pt. seen/evaluated virtually via tele-video platform with Tonia Wasserman NP, I agree with above assessment/plan. Patient superficially cooperative, somewhat guarded, not agitated, denies SI and HI. Plan as above, will follow

## 2022-08-16 LAB
AMPHET UR-MCNC: NEGATIVE — SIGNIFICANT CHANGE UP
ANION GAP SERPL CALC-SCNC: 12 MMOL/L — SIGNIFICANT CHANGE UP (ref 7–14)
BARBITURATES, URINE.: NEGATIVE — SIGNIFICANT CHANGE UP
BENZODIAZ UR-MCNC: NEGATIVE — SIGNIFICANT CHANGE UP
BUN SERPL-MCNC: 17 MG/DL — SIGNIFICANT CHANGE UP (ref 7–23)
CALCIUM SERPL-MCNC: 9 MG/DL — SIGNIFICANT CHANGE UP (ref 8.4–10.5)
CHLORIDE SERPL-SCNC: 102 MMOL/L — SIGNIFICANT CHANGE UP (ref 98–107)
CO2 SERPL-SCNC: 22 MMOL/L — SIGNIFICANT CHANGE UP (ref 22–31)
COCAINE METAB.OTHER UR-MCNC: NEGATIVE — SIGNIFICANT CHANGE UP
CREAT SERPL-MCNC: 0.91 MG/DL — SIGNIFICANT CHANGE UP (ref 0.5–1.3)
CREATININE, URINE THERAPEUTIC: 82.9 MG/DL — SIGNIFICANT CHANGE UP
EGFR: 101 ML/MIN/1.73M2 — SIGNIFICANT CHANGE UP
GLUCOSE SERPL-MCNC: 92 MG/DL — SIGNIFICANT CHANGE UP (ref 70–99)
HCT VFR BLD CALC: 46 % — SIGNIFICANT CHANGE UP (ref 39–50)
HGB BLD-MCNC: 15.4 G/DL — SIGNIFICANT CHANGE UP (ref 13–17)
MAGNESIUM SERPL-MCNC: 2.1 MG/DL — SIGNIFICANT CHANGE UP (ref 1.6–2.6)
MCHC RBC-ENTMCNC: 32.9 PG — SIGNIFICANT CHANGE UP (ref 27–34)
MCHC RBC-ENTMCNC: 33.5 GM/DL — SIGNIFICANT CHANGE UP (ref 32–36)
MCV RBC AUTO: 98.3 FL — SIGNIFICANT CHANGE UP (ref 80–100)
METHADONE UR-MCNC: NEGATIVE — SIGNIFICANT CHANGE UP
METHAQUALONE UR QL: NEGATIVE — SIGNIFICANT CHANGE UP
METHAQUALONE UR-MCNC: NEGATIVE — SIGNIFICANT CHANGE UP
NRBC # BLD: 0 /100 WBCS — SIGNIFICANT CHANGE UP
NRBC # FLD: 0 K/UL — SIGNIFICANT CHANGE UP
OPIATES UR-MCNC: NEGATIVE — SIGNIFICANT CHANGE UP
PCP UR-MCNC: NEGATIVE — SIGNIFICANT CHANGE UP
PHOSPHATE SERPL-MCNC: 3.7 MG/DL — SIGNIFICANT CHANGE UP (ref 2.5–4.5)
PLATELET # BLD AUTO: 202 K/UL — SIGNIFICANT CHANGE UP (ref 150–400)
POTASSIUM SERPL-MCNC: 4.4 MMOL/L — SIGNIFICANT CHANGE UP (ref 3.5–5.3)
POTASSIUM SERPL-SCNC: 4.4 MMOL/L — SIGNIFICANT CHANGE UP (ref 3.5–5.3)
PROPOXYPH UR QL: NEGATIVE — SIGNIFICANT CHANGE UP
RBC # BLD: 4.68 M/UL — SIGNIFICANT CHANGE UP (ref 4.2–5.8)
RBC # FLD: 12.4 % — SIGNIFICANT CHANGE UP (ref 10.3–14.5)
SODIUM SERPL-SCNC: 136 MMOL/L — SIGNIFICANT CHANGE UP (ref 135–145)
THC UR QL: NEGATIVE — SIGNIFICANT CHANGE UP
WBC # BLD: 8.9 K/UL — SIGNIFICANT CHANGE UP (ref 3.8–10.5)
WBC # FLD AUTO: 8.9 K/UL — SIGNIFICANT CHANGE UP (ref 3.8–10.5)

## 2022-08-16 PROCEDURE — 99232 SBSQ HOSP IP/OBS MODERATE 35: CPT

## 2022-08-16 PROCEDURE — 99233 SBSQ HOSP IP/OBS HIGH 50: CPT

## 2022-08-16 RX ORDER — HALOPERIDOL DECANOATE 100 MG/ML
5 INJECTION INTRAMUSCULAR EVERY 6 HOURS
Refills: 0 | Status: DISCONTINUED | OUTPATIENT
Start: 2022-08-16 | End: 2022-08-26

## 2022-08-16 RX ADMIN — Medication 1 TABLET(S): at 11:05

## 2022-08-16 RX ADMIN — ENOXAPARIN SODIUM 40 MILLIGRAM(S): 100 INJECTION SUBCUTANEOUS at 21:54

## 2022-08-16 RX ADMIN — ARIPIPRAZOLE 10 MILLIGRAM(S): 15 TABLET ORAL at 11:05

## 2022-08-16 RX ADMIN — HALOPERIDOL DECANOATE 5 MILLIGRAM(S): 100 INJECTION INTRAMUSCULAR at 16:41

## 2022-08-16 RX ADMIN — Medication 1 MILLIGRAM(S): at 11:05

## 2022-08-16 RX ADMIN — Medication 2 MILLIGRAM(S): at 16:41

## 2022-08-16 NOTE — PROVIDER CONTACT NOTE (OTHER) - ACTION/TREATMENT ORDERED:
Pt approached in calm manner and security called. Ativan 2 mg given IV, pt eventually agreed to take Haldol PO as well. 1;1 bedside, Cory made aware
EKG coincides w/ baseline. provider
Provider notified; 500mL Bolus was ordered and will recheck blood pressure after bolus is complete.
monitor pt for acute changes and await until pt is calm and cooperative, place pt back on tele

## 2022-08-16 NOTE — PROVIDER CONTACT NOTE (OTHER) - SITUATION
pt received PRN Ativan PO due to agitation and nausea at this time. score of a 4. Pt is verbally upset and stating "I DO NOT WANT THIS ON" and took off the monitor and walking around with PCA
Low Blood Pressure 102/57
Pt agitated, verbally abusive and threatening to staff ,stated "I will leave just like I did last time if the don't do test now".
15 beats of vtach. sleeping @ time. stated no signs of distress once awoken. VSS

## 2022-08-16 NOTE — PROGRESS NOTE ADULT - ASSESSMENT
50yo Male, history of bipolar d/o, medication non compliance, recently admitted for syncope c/b facial laceration a/w recurrent episodes of syncope, and uncontrolled bipolar I d/o and sowmya; consult cards 8/15, syncopal work up thus far negative. Rec;s ILR from EP, need consent from patient's brother in order to place.  52yo Male, history of bipolar d/o, medication non compliance, recently admitted for syncope c/b facial laceration a/w recurrent episodes of syncope, and uncontrolled bipolar I d/o and sowmya; consult cards 8/15, syncopal work up thus far negative. Rec;s ILR from EP, need consent from patient's brother in order to place.     Called bedboard to get patient transferred to  8/16.

## 2022-08-16 NOTE — CONSULT NOTE ADULT - NS ATTEND AMEND GEN_ALL_CORE FT
50 y/o Male, history of bipolar disorder, medication non compliance, recently admitted for syncope with facial laceration. Would benefit from ILR. Needs brother to sign consent.

## 2022-08-16 NOTE — PROGRESS NOTE ADULT - SUBJECTIVE AND OBJECTIVE BOX
[facility] Division of Hospital Medicine  Meghann Schwarz MD  Pager (M-F, 8A-5P): [phone]  Other Times:  [phone]      SUBJECTIVE / OVERNIGHT EVENTS: NAEON, pt walking the halls, chatting on the phone. Still wnts to leave.    MEDICATIONS  (STANDING):  ARIPiprazole 10 milliGRAM(s) Oral daily  enoxaparin Injectable 40 milliGRAM(s) SubCutaneous every 24 hours  folic acid 1 milliGRAM(s) Oral daily  multivitamin 1 Tablet(s) Oral daily    MEDICATIONS  (PRN):  haloperidol     Tablet 5 milliGRAM(s) Oral every 6 hours PRN anxiety/ agitation  LORazepam     Tablet 2 milliGRAM(s) Oral every 2 hours PRN Symptom-triggered 2 point increase in CIWA-Ar  LORazepam   Injectable 2 milliGRAM(s) IV Push every 2 hours PRN Symptom-triggered: each CIWA -Ar score 8 or GREATER      I&O's Summary      PHYSICAL EXAM:  Vital Signs Last 24 Hrs  T(C): 36.9 (16 Aug 2022 13:10), Max: 36.9 (16 Aug 2022 13:10)  T(F): 98.4 (16 Aug 2022 13:10), Max: 98.4 (16 Aug 2022 13:10)  HR: 74 (16 Aug 2022 13:10) (60 - 88)  BP: 131/71 (16 Aug 2022 13:10) (111/72 - 131/71)  BP(mean): --  RR: 17 (16 Aug 2022 13:10) (16 - 18)  SpO2: 100% (16 Aug 2022 13:10) (98% - 100%)    Parameters below as of 16 Aug 2022 13:10  Patient On (Oxygen Delivery Method): room air      GENERAL: calm  HEAD: multiple healing lacerations, face laceration sutures clean and dry   EYES: EOMI, conjunctiva and sclera clear  NECK: Supple, No JVD  CHEST/LUNG: Clear to auscultation bilaterally; No wheeze  HEART: Regular rate and rhythm; No murmurs, rubs, or gallops  ABDOMEN: Soft, Nontender, Nondistended; Bowel sounds present  EXTREMITIES:  2+ Peripheral Pulses, No clubbing, cyanosis, or edema  PSYCH: AAOx3  NEUROLOGY: non-focal  SKIN: multiple healing bruising on extremities   LABS:                        15.4   8.90  )-----------( 202      ( 16 Aug 2022 05:45 )             46.0     08-16    136  |  102  |  17  ----------------------------<  92  4.4   |  22  |  0.91    Ca    9.0      16 Aug 2022 05:45  Phos  3.7     08-16  Mg     2.10     08-16                  RADIOLOGY & ADDITIONAL TESTS:  Results Reviewed:   Imaging Personally Reviewed:  Electrocardiogram Personally Reviewed:    COORDINATION OF CARE:  Care Discussed with Consultants/Other Providers [Y/N]:  Prior or Outpatient Records Reviewed [Y/N]:

## 2022-08-16 NOTE — BH CONSULTATION LIAISON PROGRESS NOTE - NSBHFUPINTERVALHXFT_PSY_A_CORE
Chart reviewed. Compliant with Abilify and did not get PRNs. Pt. seen/evaluated, cooperative, reports mood is better , reports adequate sleep, no SI/HI, AVH, or paranoia.

## 2022-08-16 NOTE — PROVIDER CONTACT NOTE (OTHER) - BACKGROUND
Patient admitted for syncope and collapse.
admitted for fall
Schitzophrenia, syncope
Admitted for syncope

## 2022-08-16 NOTE — PROVIDER CONTACT NOTE (OTHER) - ASSESSMENT
15 beats of vtach. sleeping @ time. stated no signs of distress once awoken
Blood Pressure 102/57; Patient asymptomatic and denies headache, SOB, or weakness.
pt received PRN Ativan PO due to agitation and nausea at this time. score of a 4. Pt is verbally upset and stating "I DO NOT WANT THIS ON" and took off the monitor and walking around with PCA
Agitated, unresponsive to explaining the situation that it is impossible to do loop recorder right this minute. Pt called ex wife on the phone and screaming "They are holding me against my will and they make me take medicine against my will". Pt has no capacity to leave AMA.
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
## 2022-08-17 LAB
ANION GAP SERPL CALC-SCNC: 15 MMOL/L — HIGH (ref 7–14)
BUN SERPL-MCNC: 22 MG/DL — SIGNIFICANT CHANGE UP (ref 7–23)
CALCIUM SERPL-MCNC: 9.1 MG/DL — SIGNIFICANT CHANGE UP (ref 8.4–10.5)
CHLORIDE SERPL-SCNC: 104 MMOL/L — SIGNIFICANT CHANGE UP (ref 98–107)
CO2 SERPL-SCNC: 19 MMOL/L — LOW (ref 22–31)
CREAT SERPL-MCNC: 0.9 MG/DL — SIGNIFICANT CHANGE UP (ref 0.5–1.3)
EGFR: 102 ML/MIN/1.73M2 — SIGNIFICANT CHANGE UP
GLUCOSE SERPL-MCNC: 103 MG/DL — HIGH (ref 70–99)
HCT VFR BLD CALC: 45.2 % — SIGNIFICANT CHANGE UP (ref 39–50)
HGB BLD-MCNC: 14.8 G/DL — SIGNIFICANT CHANGE UP (ref 13–17)
MAGNESIUM SERPL-MCNC: 2.1 MG/DL — SIGNIFICANT CHANGE UP (ref 1.6–2.6)
MCHC RBC-ENTMCNC: 32.5 PG — SIGNIFICANT CHANGE UP (ref 27–34)
MCHC RBC-ENTMCNC: 32.7 GM/DL — SIGNIFICANT CHANGE UP (ref 32–36)
MCV RBC AUTO: 99.3 FL — SIGNIFICANT CHANGE UP (ref 80–100)
NRBC # BLD: 0 /100 WBCS — SIGNIFICANT CHANGE UP (ref 0–0)
NRBC # FLD: 0 K/UL — SIGNIFICANT CHANGE UP (ref 0–0)
PHOSPHATE SERPL-MCNC: 3.6 MG/DL — SIGNIFICANT CHANGE UP (ref 2.5–4.5)
PLATELET # BLD AUTO: 204 K/UL — SIGNIFICANT CHANGE UP (ref 150–400)
POTASSIUM SERPL-MCNC: 4.1 MMOL/L — SIGNIFICANT CHANGE UP (ref 3.5–5.3)
POTASSIUM SERPL-SCNC: 4.1 MMOL/L — SIGNIFICANT CHANGE UP (ref 3.5–5.3)
RBC # BLD: 4.55 M/UL — SIGNIFICANT CHANGE UP (ref 4.2–5.8)
RBC # FLD: 12.7 % — SIGNIFICANT CHANGE UP (ref 10.3–14.5)
SODIUM SERPL-SCNC: 138 MMOL/L — SIGNIFICANT CHANGE UP (ref 135–145)
WBC # BLD: 9.07 K/UL — SIGNIFICANT CHANGE UP (ref 3.8–10.5)
WBC # FLD AUTO: 9.07 K/UL — SIGNIFICANT CHANGE UP (ref 3.8–10.5)

## 2022-08-17 PROCEDURE — 99232 SBSQ HOSP IP/OBS MODERATE 35: CPT

## 2022-08-17 PROCEDURE — 99233 SBSQ HOSP IP/OBS HIGH 50: CPT

## 2022-08-17 RX ADMIN — ENOXAPARIN SODIUM 40 MILLIGRAM(S): 100 INJECTION SUBCUTANEOUS at 21:50

## 2022-08-17 RX ADMIN — Medication 1 MILLIGRAM(S): at 11:27

## 2022-08-17 RX ADMIN — ARIPIPRAZOLE 10 MILLIGRAM(S): 15 TABLET ORAL at 11:27

## 2022-08-17 RX ADMIN — Medication 1 TABLET(S): at 11:27

## 2022-08-17 NOTE — BH CONSULTATION LIAISON PROGRESS NOTE - NSBHFUPINTERVALHXFT_PSY_A_CORE
Chart reviewed. Compliant with Abilify, received Ativan 2mg per CIWA and Haldol 5mg po x1 yesterday. Patient seen/evaluated, cooperative, visibly upset but not agitated, stating " I am feeling upset, they gave me meds.". He  reports his mood is good, denies SI/HI, AVH, made vague paranoid statements but did not elaborate.     Provided me his ex-wife number to contact; Lesli [phone] Chart reviewed. Compliant with Abilify, received Ativan 2mg per CIWA and Haldol 5mg po x1 yesterday. Patient seen/evaluated, cooperative, visibly upset but not agitated, stating " I am feeling upset, they gave me meds.". He  reports his mood is good, denies SI/HI, AVH, made vague paranoid statements but did not elaborate.     Provided me his ex-wife number to contact; Lesli [phone]  I spoke with Lesli, she reports patient came to the hospital for passing out at home,  has h/o depression and insomnia, when he is upset, he talks fast, report patient possibly with poor insight into his psychiatric diagnosis, one inpt. admission for SI vs SA, she reports patient has relationship issues with her brother.  Chart reviewed. Compliant with Abilify, received Ativan 2mg per CIWA and Haldol 5mg po x1 yesterday. Patient seen/evaluated, cooperative, visibly upset but not agitated, stating " I am feeling upset, they gave me meds.". He  reports his mood is good, denies SI/HI, AVH, made vague paranoid statements but did not elaborate.     Provided me his ex-wife number to contact; Lesli [phone]  I spoke with Lesli, she reports patient came to the hospital for passing out at home,  has h/o depression and insomnia, when he is upset, he talks fast, report patient possibly with poor insight into his psychiatric diagnosis, one inpt. admission for SI vs SA, she reports patient has relationship issues with his brother.

## 2022-08-17 NOTE — PROGRESS NOTE ADULT - SUBJECTIVE AND OBJECTIVE BOX
[facility] Division of Hospital Medicine  Meghann Schwarz MD  Pager (M-F, 8A-5P): [phone]  Other Times:  [id number]      SUBJECTIVE / OVERNIGHT EVENTS: Yesterday, pt had episode where he wanted to leave AMA, staff had to administer IV ativan and oral haldol. Pt otherwise has not had any syncopal episodes    MEDICATIONS  (STANDING):  ARIPiprazole 10 milliGRAM(s) Oral daily  enoxaparin Injectable 40 milliGRAM(s) SubCutaneous every 24 hours  folic acid 1 milliGRAM(s) Oral daily  multivitamin 1 Tablet(s) Oral daily    MEDICATIONS  (PRN):  haloperidol     Tablet 5 milliGRAM(s) Oral every 6 hours PRN anxiety/ agitation  haloperidol    Injectable 5 milliGRAM(s) IntraMuscular every 6 hours PRN Agitation      I&O's Summary      PHYSICAL EXAM:  Vital Signs Last 24 Hrs  T(C): 36.8 (17 Aug 2022 12:19), Max: 36.9 (16 Aug 2022 18:19)  T(F): 98.3 (17 Aug 2022 12:19), Max: 98.5 (16 Aug 2022 18:19)  HR: 60 (17 Aug 2022 12:19) (60 - 72)  BP: 125/80 (17 Aug 2022 12:19) (100/75 - 133/69)  BP(mean): --  RR: 18 (17 Aug 2022 12:19) (18 - 18)  SpO2: 99% (17 Aug 2022 12:19) (98% - 99%)    Parameters below as of 17 Aug 2022 12:19  Patient On (Oxygen Delivery Method): room air      GENERAL: calm  HEAD: multiple healing lacerations, face laceration sutures clean and dry   EYES: EOMI, conjunctiva and sclera clear  NECK: Supple, No JVD  CHEST/LUNG: Clear to auscultation bilaterally; No wheeze  HEART: Regular rate and rhythm; No murmurs, rubs, or gallops  ABDOMEN: Soft, Nontender, Nondistended; Bowel sounds present  EXTREMITIES:  2+ Peripheral Pulses, No clubbing, cyanosis, or edema  PSYCH: AAOx3  NEUROLOGY: non-focal  SKIN: multiple healing bruising on extremities     LABS:                        14.8   9.07  )-----------( 204      ( 17 Aug 2022 07:14 )             45.2     08-17    138  |  104  |  22  ----------------------------<  103<H>  4.1   |  19<L>  |  0.90    Ca    9.1      17 Aug 2022 07:14  Phos  3.6     08-17  Mg     2.10     08-17                  RADIOLOGY & ADDITIONAL TESTS:  Results Reviewed:   Imaging Personally Reviewed:  Electrocardiogram Personally Reviewed:    COORDINATION OF CARE:  Care Discussed with Consultants/Other Providers [Y/N]:  Prior or Outpatient Records Reviewed [Y/N]:
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
## 2022-08-17 NOTE — PROGRESS NOTE ADULT - SUBJECTIVE AND OBJECTIVE BOX
Interval History:  Patient agitated overnight and attempted to leave AMA however lacks capacity to do so  Security called overnight for agitation    MEDICATIONS  (STANDING):  ARIPiprazole 10 milliGRAM(s) Oral daily  enoxaparin Injectable 40 milliGRAM(s) SubCutaneous every 24 hours  folic acid 1 milliGRAM(s) Oral daily  multivitamin 1 Tablet(s) Oral daily    MEDICATIONS  (PRN):  haloperidol     Tablet 5 milliGRAM(s) Oral every 6 hours PRN anxiety/ agitation  haloperidol    Injectable 5 milliGRAM(s) IntraMuscular every 6 hours PRN Agitation    Vital Signs Last 24 Hrs  T(C): 36.8 (08-17-22 @ 05:59), Max: 36.9 (08-16-22 @ 13:10)  T(F): 98.2 (08-17-22 @ 05:59), Max: 98.5 (08-16-22 @ 18:19)  HR: 69 (08-17-22 @ 05:59) (69 - 74)  BP: 115/72 (08-17-22 @ 05:59) (100/75 - 133/69)  BP(mean): --  RR: 18 (08-17-22 @ 05:59) (17 - 18)  SpO2: 98% (08-17-22 @ 05:59) (98% - 100%)    Appearance: Normal	  HEENT:   Normal oral mucosa, PERRL, EOMI	  Lymphatic: No lymphadenopathy  Cardiovascular: Normal S1 S2, No JVD, No murmurs, No edema  Respiratory: Lungs clear to auscultation	  Psychiatry: A & O x 3, Mood & affect appropriate  Gastrointestinal:  Soft, Non-tender, + BS	  Skin: No rashes, No ecchymoses, No cyanosis	  Neurologic: Non-focal  Extremities: Normal range of motion, No clubbing, cyanosis or edema  Vascular: Peripheral pulses palpable 2+ bilaterally    LABS:	 	    CBC Full  -  ( 17 Aug 2022 07:14 )  WBC Count : 9.07 K/uL  Hemoglobin : 14.8 g/dL  Hematocrit : 45.2 %  Platelet Count - Automated : 204 K/uL  Mean Cell Volume : 99.3 fL  Mean Cell Hemoglobin : 32.5 pg  Mean Cell Hemoglobin Concentration : 32.7 gm/dL  Auto Neutrophil # : x  Auto Lymphocyte # : x  Auto Monocyte # : x  Auto Eosinophil # : x  Auto Basophil # : x  Auto Neutrophil % : x  Auto Lymphocyte % : x  Auto Monocyte % : x  Auto Eosinophil % : x  Auto Basophil % : x    08-17    138  |  104  |  22  ----------------------------<  103<H>  4.1   |  19<L>  |  0.90  08-16    136  |  102  |  17  ----------------------------<  92  4.4   |  22  |  0.91    Ca    9.1      17 Aug 2022 07:14  Ca    9.0      16 Aug 2022 05:45  Phos  3.6     08-17  Phos  3.7     08-16  Mg     2.10     08-17  Mg     2.10     08-16

## 2022-08-17 NOTE — PROGRESS NOTE ADULT - ASSESSMENT
50yo Male, history of bipolar d/o, medication non compliance, recently admitted for syncope c/b facial laceration a/w recurrent episodes of syncope, and uncontrolled bipolar I d/o and sowmya; consult cards 8/15, syncopal work up thus far negative. Rec;s ILR from EP, need consent from patient's brother in order to place.     Called bedboard to get patient transferred to  8/16-17.
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
## 2022-08-18 PROBLEM — F31.9 BIPOLAR DISORDER, UNSPECIFIED: Chronic | Status: ACTIVE | Noted: 2022-08-11

## 2022-08-18 LAB
ANION GAP SERPL CALC-SCNC: 12 MMOL/L — SIGNIFICANT CHANGE UP (ref 7–14)
BASOPHILS # BLD AUTO: 0.06 K/UL — SIGNIFICANT CHANGE UP (ref 0–0.2)
BASOPHILS NFR BLD AUTO: 0.7 % — SIGNIFICANT CHANGE UP (ref 0–2)
BUN SERPL-MCNC: 22 MG/DL — SIGNIFICANT CHANGE UP (ref 7–23)
CALCIUM SERPL-MCNC: 9.4 MG/DL — SIGNIFICANT CHANGE UP (ref 8.4–10.5)
CHLORIDE SERPL-SCNC: 104 MMOL/L — SIGNIFICANT CHANGE UP (ref 98–107)
CO2 SERPL-SCNC: 24 MMOL/L — SIGNIFICANT CHANGE UP (ref 22–31)
CREAT SERPL-MCNC: 0.95 MG/DL — SIGNIFICANT CHANGE UP (ref 0.5–1.3)
EGFR: 96 ML/MIN/1.73M2 — SIGNIFICANT CHANGE UP
EOSINOPHIL # BLD AUTO: 0.24 K/UL — SIGNIFICANT CHANGE UP (ref 0–0.5)
EOSINOPHIL NFR BLD AUTO: 2.6 % — SIGNIFICANT CHANGE UP (ref 0–6)
GLUCOSE SERPL-MCNC: 90 MG/DL — SIGNIFICANT CHANGE UP (ref 70–99)
HCT VFR BLD CALC: 43.3 % — SIGNIFICANT CHANGE UP (ref 39–50)
HGB BLD-MCNC: 14.3 G/DL — SIGNIFICANT CHANGE UP (ref 13–17)
IANC: 3.59 K/UL — SIGNIFICANT CHANGE UP (ref 1.8–7.4)
IMM GRANULOCYTES NFR BLD AUTO: 0.4 % — SIGNIFICANT CHANGE UP (ref 0–1.5)
LYMPHOCYTES # BLD AUTO: 4.34 K/UL — HIGH (ref 1–3.3)
LYMPHOCYTES # BLD AUTO: 47 % — HIGH (ref 13–44)
MAGNESIUM SERPL-MCNC: 2.1 MG/DL — SIGNIFICANT CHANGE UP (ref 1.6–2.6)
MCHC RBC-ENTMCNC: 32.6 PG — SIGNIFICANT CHANGE UP (ref 27–34)
MCHC RBC-ENTMCNC: 33 GM/DL — SIGNIFICANT CHANGE UP (ref 32–36)
MCV RBC AUTO: 98.9 FL — SIGNIFICANT CHANGE UP (ref 80–100)
MONOCYTES # BLD AUTO: 0.96 K/UL — HIGH (ref 0–0.9)
MONOCYTES NFR BLD AUTO: 10.4 % — SIGNIFICANT CHANGE UP (ref 2–14)
NEUTROPHILS # BLD AUTO: 3.59 K/UL — SIGNIFICANT CHANGE UP (ref 1.8–7.4)
NEUTROPHILS NFR BLD AUTO: 38.9 % — LOW (ref 43–77)
NRBC # BLD: 0 /100 WBCS — SIGNIFICANT CHANGE UP (ref 0–0)
NRBC # FLD: 0 K/UL — SIGNIFICANT CHANGE UP (ref 0–0)
PHOSPHATE SERPL-MCNC: 4 MG/DL — SIGNIFICANT CHANGE UP (ref 2.5–4.5)
PLATELET # BLD AUTO: 228 K/UL — SIGNIFICANT CHANGE UP (ref 150–400)
POTASSIUM SERPL-MCNC: 4.2 MMOL/L — SIGNIFICANT CHANGE UP (ref 3.5–5.3)
POTASSIUM SERPL-SCNC: 4.2 MMOL/L — SIGNIFICANT CHANGE UP (ref 3.5–5.3)
RBC # BLD: 4.38 M/UL — SIGNIFICANT CHANGE UP (ref 4.2–5.8)
RBC # FLD: 12.4 % — SIGNIFICANT CHANGE UP (ref 10.3–14.5)
SODIUM SERPL-SCNC: 140 MMOL/L — SIGNIFICANT CHANGE UP (ref 135–145)
WBC # BLD: 9.23 K/UL — SIGNIFICANT CHANGE UP (ref 3.8–10.5)
WBC # FLD AUTO: 9.23 K/UL — SIGNIFICANT CHANGE UP (ref 3.8–10.5)

## 2022-08-18 PROCEDURE — 99232 SBSQ HOSP IP/OBS MODERATE 35: CPT

## 2022-08-18 PROCEDURE — 99231 SBSQ HOSP IP/OBS SF/LOW 25: CPT

## 2022-08-18 RX ORDER — LANOLIN ALCOHOL/MO/W.PET/CERES
3 CREAM (GRAM) TOPICAL AT BEDTIME
Refills: 0 | Status: DISCONTINUED | OUTPATIENT
Start: 2022-08-18 | End: 2022-08-26

## 2022-08-18 RX ORDER — ACETAMINOPHEN 500 MG
650 TABLET ORAL ONCE
Refills: 0 | Status: COMPLETED | OUTPATIENT
Start: 2022-08-18 | End: 2022-08-18

## 2022-08-18 RX ADMIN — Medication 650 MILLIGRAM(S): at 11:30

## 2022-08-18 RX ADMIN — Medication 1 MILLIGRAM(S): at 11:27

## 2022-08-18 RX ADMIN — Medication 650 MILLIGRAM(S): at 11:00

## 2022-08-18 RX ADMIN — ARIPIPRAZOLE 10 MILLIGRAM(S): 15 TABLET ORAL at 11:27

## 2022-08-18 RX ADMIN — Medication 3 MILLIGRAM(S): at 22:19

## 2022-08-18 RX ADMIN — Medication 1 TABLET(S): at 11:27

## 2022-08-18 RX ADMIN — ENOXAPARIN SODIUM 40 MILLIGRAM(S): 100 INJECTION SUBCUTANEOUS at 22:19

## 2022-08-18 NOTE — PROGRESS NOTE ADULT - ASSESSMENT
50yo Male, history of bipolar d/o, worsening behavior over the past 1-2 years per family (gambling addition, erratic moods), medication non compliance, recently admitted for syncope c/b facial laceration a/w recurrent episodes of syncope, and uncontrolled bipolar I d/o and sowmya; consult cards 8/15, syncopal work up thus far negative. Rec;s ILR from , need consent from patient's brother in order to place.     Called bedboard to get patient transferred to  8/16-17.

## 2022-08-18 NOTE — PROGRESS NOTE ADULT - SUBJECTIVE AND OBJECTIVE BOX
[facility] Division of Hospital Medicine  Meghann Schwarz MD  Pager (M-F, 8A-5P): [phone]  Other Times:  [phone]      SUBJECTIVE / OVERNIGHT EVENTS: Pt wants to leave again. Says "I'm leaving after tonight." Still does not want us to communicate with brother.    MEDICATIONS  (STANDING):  ARIPiprazole 10 milliGRAM(s) Oral daily  enoxaparin Injectable 40 milliGRAM(s) SubCutaneous every 24 hours  folic acid 1 milliGRAM(s) Oral daily  multivitamin 1 Tablet(s) Oral daily    MEDICATIONS  (PRN):  haloperidol     Tablet 5 milliGRAM(s) Oral every 6 hours PRN anxiety/ agitation  haloperidol    Injectable 5 milliGRAM(s) IntraMuscular every 6 hours PRN Agitation      I&O's Summary      PHYSICAL EXAM:  Vital Signs Last 24 Hrs  T(C): 36.6 (18 Aug 2022 06:18), Max: 36.6 (17 Aug 2022 21:18)  T(F): 97.8 (18 Aug 2022 06:18), Max: 97.8 (17 Aug 2022 21:18)  HR: 58 (18 Aug 2022 06:18) (58 - 67)  BP: 123/77 (18 Aug 2022 06:18) (123/77 - 134/70)  BP(mean): --  RR: 18 (18 Aug 2022 06:18) (18 - 18)  SpO2: 99% (18 Aug 2022 06:18) (99% - 99%)    Parameters below as of 18 Aug 2022 06:18  Patient On (Oxygen Delivery Method): room air      GENERAL: calm  HEAD: multiple healing lacerations, face laceration sutures clean and dry   EYES: EOMI, conjunctiva and sclera clear  NECK: Supple, No JVD  CHEST/LUNG: Clear to auscultation bilaterally; No wheeze  HEART: Regular rate and rhythm; No murmurs, rubs, or gallops  ABDOMEN: Soft, Nontender, Nondistended; Bowel sounds present  EXTREMITIES:  2+ Peripheral Pulses, No clubbing, cyanosis, or edema  PSYCH: AAOx3  NEUROLOGY: non-focal  SKIN: multiple healing bruising on extremities     LABS:                        14.3   9.23  )-----------( 228      ( 18 Aug 2022 06:55 )             43.3     08-18    140  |  104  |  22  ----------------------------<  90  4.2   |  24  |  0.95    Ca    9.4      18 Aug 2022 06:55  Phos  4.0     08-18  Mg     2.10     08-18                  RADIOLOGY & ADDITIONAL TESTS:  Results Reviewed:   Imaging Personally Reviewed: MRI brain from 2014, CT head this admission  Electrocardiogram Personally Reviewed:    COORDINATION OF CARE:  Care Discussed with Consultants/Other Providers [Y/N]: Dr. Rizvi Psych  Prior or Outpatient Records Reviewed [Y/N]:

## 2022-08-18 NOTE — BH CONSULTATION LIAISON PROGRESS NOTE - NSBHASSESSMENTFT_PSY_ALL_CORE
52 yo male,  from wife, from [place], recently lost job, with PMHx 2 recent falls, PPHx  Bipolar Disorder, hx of multiple inpatient psychiatric hospitalizations- last in 2014, hx of two SAs (overdose, and aborted attempted related to bridge), hx of gambling, no substance use disorder, + recent verbally aggressive behavior. + recent multiple legal issues- speeding citations. Currently treated at [facility] since 2014. Patient recently seen at [facility] ED last on 8/7/22- suture to head and signed out AMA refusing cardiac/telemetry work up. BIBA refereed by psychiatrist for manic behavior.   Patient presents to the ED in the context of manic behavior. Patient has been non complaint with medication and although he has a history of a gambling problem- he has not been sleeping, gambling issues have worsened, he is acting erratically and shows poor insight into both psychiatric and medication issues - leaving AMA refusing medical work ups for syncopal episodes. Patient shows poor insight and judgement and is impulsive and unpredictable. Pt is at elevated risk for inadvertent injury to self/others due to exacerbation and intensity of symptoms and will therefore require admission to inpatient psychiatry at this time.    8/13: patient seen as follow up today, a&o, calm, cooperative, no behavioral issues reported by staff, patient denies suicidal/homicidal ideation, denies auditory/visual hallucinations:  8/14: lethargic but easily arousal, no acute events, denies SI/HI   8/15: Patient awake and alert. CIWAs 0-2. No PRNs for agitation. Patient calm, cooperative. No current overt signs of sowmya, no SI/HI, AVH.  8/16: Patient oriented, well engaged, no current overt signs of sowmya, no SI/HI, denies AVH, pt. with limited insight into his psychiatric condition. Amenable to continue Abilify.   8/17: Required PRN yesterday, seems more irritable today, denies SI and HI, remains with limited insight into his psychiatric condition. Amenable to continue Abilify.  8/18: a&o, 1:1 CO maintained, no prn's,  mood varies, focused on going home, thought blocking-does not know why he is in the hospital, medication compliant, denies si/hi/ah/vh,        PLAN:  -c/w 1:1 constant observation given elopement risk, s/s sowmya, ****pt. will benefit from transfer to 7S mindful care unit, will not need CO 1:1 on 7S   -Continue Abilify to 10mg daily  -c/w Haldol 5mg q6h PO/Im/IV q6h prn for agitation  -Monitor EKG qtc interval and hold above antipsychotics if qtc >500  -CIWA and ATivan PRN as per primary team  -CL to follow  -Patient CANNOT leave AMA  -Case d/w Dr. Schwarz  -Collateral needed from patient's brother [place] [phone] (pt. provided verbal consent)  -Care coordinated with patient's ex-wife Lesli [phone] (see above for details)\

## 2022-08-18 NOTE — BH CONSULTATION LIAISON PROGRESS NOTE - NSBHPSYCHOLCOGORIENT_PSY_A_CORE
Oriented to time, place, person, situation
Spontaneous, unlabored and symmetrical
Not fully oriented...
Oriented to time, place, person, situation
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
## 2022-08-18 NOTE — BH CONSULTATION LIAISON PROGRESS NOTE - NSBHFUPINTERVALHXFT_PSY_A_CORE
Chart reviewed. No prn's given. Patient seen, 1:1 constant observation maintained, PCA at bedside, a&o, calm, cooperative, however, noted agitation in demeanor when stating he wants to go home after he is evaluated, patient reports he came to the hospital at his brother's request, he is unable to provide any details and or symptoms for hospitalization, during interview patient asks writer to go though his phone to look at his pictures, when told otherwise he suggested he could email the pictures to writer, patient is medication compliant, denies suicidal ideation, denies auditory/visual hallucinations, endorses sleep disturbance in relation to noise on unit and being wakened for vital signs, denies appetite disturbance.    Jay reports to writer obtaining collateral from patient's sister-in-law who lives in 2 family home with patient, she endorses patient is not medication compliant, gambles, aggressive, recently lost his job, leaves at night and brother rendering care (ADLs) to patient as he is incapable (see care coordination note 8/18).

## 2022-08-18 NOTE — PROGRESS NOTE ADULT - SUBJECTIVE AND OBJECTIVE BOX
Interval History:  Telemetry: NSR; no ectopy noted   Remains agitated at times but agreeable to ILR     MEDICATIONS  (STANDING):  ARIPiprazole 10 milliGRAM(s) Oral daily  enoxaparin Injectable 40 milliGRAM(s) SubCutaneous every 24 hours  folic acid 1 milliGRAM(s) Oral daily  multivitamin 1 Tablet(s) Oral daily    MEDICATIONS  (PRN):  haloperidol     Tablet 5 milliGRAM(s) Oral every 6 hours PRN anxiety/ agitation  haloperidol    Injectable 5 milliGRAM(s) IntraMuscular every 6 hours PRN Agitation    Vital Signs Last 24 Hrs  T(C): 36.6 (08-18-22 @ 06:18), Max: 36.8 (08-17-22 @ 12:19)  T(F): 97.8 (08-18-22 @ 06:18), Max: 98.3 (08-17-22 @ 12:19)  HR: 58 (08-18-22 @ 06:18) (58 - 67)  BP: 123/77 (08-18-22 @ 06:18) (123/77 - 134/70)  BP(mean): --  RR: 18 (08-18-22 @ 06:18) (18 - 18)  SpO2: 99% (08-18-22 @ 06:18) (99% - 99%)    Appearance: Normal	  HEENT:   Normal oral mucosa, PERRL, EOMI	  Lymphatic: No lymphadenopathy  Cardiovascular: Normal S1 S2, No JVD, No murmurs, No edema  Respiratory: Lungs clear to auscultation	  Psychiatry: A & O x 3, Mood & affect appropriate  Gastrointestinal:  Soft, Non-tender, + BS	  Skin: No rashes, No ecchymoses, No cyanosis	  Neurologic: Non-focal  Extremities: Normal range of motion, No clubbing, cyanosis or edema  Vascular: Peripheral pulses palpable 2+ bilaterally    LABS:	 	    CBC Full  -  ( 18 Aug 2022 06:55 )  WBC Count : 9.23 K/uL  Hemoglobin : 14.3 g/dL  Hematocrit : 43.3 %  Platelet Count - Automated : 228 K/uL  Mean Cell Volume : 98.9 fL  Mean Cell Hemoglobin : 32.6 pg  Mean Cell Hemoglobin Concentration : 33.0 gm/dL  Auto Neutrophil # : 3.59 K/uL  Auto Lymphocyte # : 4.34 K/uL  Auto Monocyte # : 0.96 K/uL  Auto Eosinophil # : 0.24 K/uL  Auto Basophil # : 0.06 K/uL  Auto Neutrophil % : 38.9 %  Auto Lymphocyte % : 47.0 %  Auto Monocyte % : 10.4 %  Auto Eosinophil % : 2.6 %  Auto Basophil % : 0.7 %    08-18    140  |  104  |  22  ----------------------------<  90  4.2   |  24  |  0.95  08-17    138  |  104  |  22  ----------------------------<  103<H>  4.1   |  19<L>  |  0.90    Ca    9.4      18 Aug 2022 06:55  Ca    9.1      17 Aug 2022 07:14  Phos  4.0     08-18  Phos  3.6     08-17  Mg     2.10     08-18  Mg     2.10     08-17

## 2022-08-19 LAB
SARS-COV-2 RNA SPEC QL NAA+PROBE: SIGNIFICANT CHANGE UP
SARS-COV-2 RNA SPEC QL NAA+PROBE: SIGNIFICANT CHANGE UP

## 2022-08-19 PROCEDURE — 99232 SBSQ HOSP IP/OBS MODERATE 35: CPT

## 2022-08-19 PROCEDURE — 99233 SBSQ HOSP IP/OBS HIGH 50: CPT

## 2022-08-19 RX ORDER — ACETAMINOPHEN 500 MG
650 TABLET ORAL ONCE
Refills: 0 | Status: COMPLETED | OUTPATIENT
Start: 2022-08-19 | End: 2022-08-19

## 2022-08-19 RX ADMIN — Medication 1 TABLET(S): at 11:39

## 2022-08-19 RX ADMIN — Medication 1 MILLIGRAM(S): at 11:39

## 2022-08-19 RX ADMIN — Medication 3 MILLIGRAM(S): at 19:13

## 2022-08-19 RX ADMIN — Medication 650 MILLIGRAM(S): at 01:17

## 2022-08-19 RX ADMIN — ARIPIPRAZOLE 10 MILLIGRAM(S): 15 TABLET ORAL at 11:40

## 2022-08-19 RX ADMIN — Medication 650 MILLIGRAM(S): at 02:00

## 2022-08-19 NOTE — DIETITIAN INITIAL EVALUATION ADULT - NS FNS DIET ORDER
Diet, NPO after Midnight:      NPO Start Date: 18-Aug-2022,   NPO Start Time: 23:59  Except Medications (08-18-22 @ 21:09)

## 2022-08-19 NOTE — PROGRESS NOTE ADULT - ASSESSMENT
50yo Male, history of bipolar d/o, worsening behavior over the past 1-2 years per family (gambling addition, erratic moods), medication non compliance, recently admitted for syncope c/b facial laceration a/w recurrent episodes of syncope, and uncontrolled bipolar I d/o and sowmya; consult cards 8/15, syncopal work up thus far negative. Rec;s ILR from EP placed on 8/19. D/c to [facility]. Uptitrating Abilify.    Called bedboard to get patient transferred to  8/16-17.

## 2022-08-19 NOTE — PROGRESS NOTE ADULT - NS ATTEND AMEND GEN_ALL_CORE FT
50 y/o Male, history of bipolar disorder, medication non compliance, recently admitted for syncope with facial laceration. Would benefit from ILR. Needs brother to sign consent.
50 y/o Male, history of bipolar disorder, medication non compliance, recently admitted for syncope with facial laceration. Would benefit from ILR. Needs brother to sign consent.
52 y/o Male, history of bipolar disorder, medication non compliance, recently admitted for syncope with facial laceration. Would benefit from ILR. s/p ILR today.

## 2022-08-19 NOTE — PROGRESS NOTE ADULT - SUBJECTIVE AND OBJECTIVE BOX
[facility] Division of Hospital Medicine  Meghann Schwarz MD  Pager (M-F, 8A-5P): [phone]  Other Times:  [phone]      SUBJECTIVE / OVERNIGHT EVENTS: ILR placed, pt medically stable for transfer to [facility]. Spoke mi Arceo (ex wife), pt handed me the phone. Updated her on plan and hospital course.    MEDICATIONS  (STANDING):  ARIPiprazole 10 milliGRAM(s) Oral daily  enoxaparin Injectable 40 milliGRAM(s) SubCutaneous every 24 hours  folic acid 1 milliGRAM(s) Oral daily  multivitamin 1 Tablet(s) Oral daily    MEDICATIONS  (PRN):  haloperidol     Tablet 5 milliGRAM(s) Oral every 6 hours PRN anxiety/ agitation  haloperidol    Injectable 5 milliGRAM(s) IntraMuscular every 6 hours PRN Agitation  melatonin 3 milliGRAM(s) Oral at bedtime PRN Insomnia      I&O's Summary      PHYSICAL EXAM:  Vital Signs Last 24 Hrs  T(C): 36.6 (19 Aug 2022 05:23), Max: 36.8 (19 Aug 2022 01:12)  T(F): 97.9 (19 Aug 2022 05:23), Max: 98.2 (19 Aug 2022 01:12)  HR: 61 (19 Aug 2022 05:23) (61 - 69)  BP: 141/92 (19 Aug 2022 05:23) (113/81 - 141/92)  BP(mean): --  RR: 16 (19 Aug 2022 05:23) (16 - 18)  SpO2: 97% (19 Aug 2022 05:23) (97% - 99%)    Parameters below as of 19 Aug 2022 05:23  Patient On (Oxygen Delivery Method): room air      GENERAL: calm  HEAD: multiple healing lacerations, face laceration sutures clean and dry   EYES: EOMI, conjunctiva and sclera clear  NECK: Supple, No JVD  CHEST/LUNG: Clear to auscultation bilaterally; No wheeze  HEART: Regular rate and rhythm; No murmurs, rubs, or gallops  ABDOMEN: Soft, Nontender, Nondistended; Bowel sounds present  EXTREMITIES:  2+ Peripheral Pulses, No clubbing, cyanosis, or edema  PSYCH: AAOx3  NEUROLOGY: non-focal  SKIN: multiple healing bruising on extremities   LABS:                        14.3   9.23  )-----------( 228      ( 18 Aug 2022 06:55 )             43.3     08-18    140  |  104  |  22  ----------------------------<  90  4.2   |  24  |  0.95    Ca    9.4      18 Aug 2022 06:55  Phos  4.0     08-18  Mg     2.10     08-18                  RADIOLOGY & ADDITIONAL TESTS:  Results Reviewed:   Imaging Personally Reviewed:  Electrocardiogram Personally Reviewed:    COORDINATION OF CARE:  Care Discussed with Consultants/Other Providers [Y/N]: Dr Palomares. Psych  Prior or Outpatient Records Reviewed [Y/N]:

## 2022-08-19 NOTE — CHART NOTE - NSCHARTNOTEFT_GEN_A_CORE
Patient s/p ILR placement today. Chest wall incision site checked with dressing removed. Site c/d/i without purulence or erythema. Dressing replaced. Will continue to monitor.

## 2022-08-19 NOTE — DIETITIAN INITIAL EVALUATION ADULT - OTHER INFO
52yo Male, history of bipolar d/o, worsening behavior over the past 1-2 years per family (gambling addition, erratic moods), medication non compliance, recently admitted for syncope c/b facial laceration a/w recurrent episodes of syncope, and uncontrolled bipolar I d/o and sowmya; consult cards 8/15, syncopal work up thus far negative.     Currently NPO for ILR procedure per chart. Pt is on 1:1 observation and is awaiting transfer to mindful care unit, 7S.  When eating has good po intakes and denies chewing, swallowing difficulties or any nausea, vomiting, diarrhea, constipation. Last bowel movement 8/19. At visit, Pt was very fixated on his hospital stay, repeating that he does not want to be here. Pt also stated he does not like hospital meals and prefers foods prepared at home. Denies weight change prior to admission, usual weight 210-215#.   Noted previous diet order for CSTSCHOSN. No hx of Diabetes, suggest d/c restriction.  52yo Male, history of bipolar d/o, worsening behavior over the past 1-2 years per family (gambling addition, erratic moods), medication non compliance, recently admitted for syncope c/b facial laceration a/w recurrent episodes of syncope, and uncontrolled bipolar I d/o and sowmya; consult cards 8/15, syncopal work up thus far negative.     Currently NPO for ILR procedure per chart. Pt is on 1:1 observation and is awaiting transfer to mindful care unit, 7S.  When eating has good po intakes and denies chewing, swallowing difficulties or any nausea, vomiting, diarrhea, constipation. Last bowel movement 8/19. At visit, Pt was very fixated on his hospital stay, repeating that he does not want to be here. Pt also stated he does not like hospital meals and prefers foods prepared at home. Denies weight change prior to admission, usual weight 210-215#.   Noted previous diet order for CSTCHOSN. No hx of Diabetes, suggest d/c restriction.

## 2022-08-19 NOTE — DIETITIAN INITIAL EVALUATION ADULT - PERTINENT LABORATORY DATA
08-18    140  |  104  |  22  ----------------------------<  90  4.2   |  24  |  0.95    Ca    9.4      18 Aug 2022 06:55  Phos  4.0     08-18  Mg     2.10     08-18    A1C with Estimated Average Glucose Result: 5.5 % (08-13-22 @ 06:26)  A1C with Estimated Average Glucose Result: 5.3 % (05-19-22 @ 11:20)
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
## 2022-08-19 NOTE — PROGRESS NOTE ADULT - SUBJECTIVE AND OBJECTIVE BOX
Patient stated that he understands and speak English. Patient stated that he does not need a .    Subjective: Denies HA, lightheadedness, dizziness, CP, palpitation, SOB, and pain at the ILR site    Interval events:  - Patient was recently admitted for syncope with facial laceration - signed out AMA from [facility]  on 8/7/22 presented to ED as advised by [facility] crisis center.  -  determined the pt does not have capacity to leave AMA; placed on 1:1 monitoring for elopement risk and restarted  Abilify 5mg QHS and Haldol 5mg PO/IM PRN.   - EP consulted for syncope. Tele reviewed sinus rhythm with HR 60s-70s with short run of PAT. Echocardiogram shows normal LV function. Orthostatics were negative.  -  s/p [employer] ILR. Post-op ILR instruction has been verbally explained and given to the patient and his brother, Luis F Zambrano over the phone at [phone]. Patient was given ID card, Booklet and home monitor. Patient and brother expressed understanding and all questions were answered. A carbon copy is located in the patient chart  - Patient is schedule for an appointment on 9/2/2022 at 11:30am at 4th floor Oncology Building [phone]     MEDICATIONS  (STANDING):  ARIPiprazole 10 milliGRAM(s) Oral daily  enoxaparin Injectable 40 milliGRAM(s) SubCutaneous every 24 hours  folic acid 1 milliGRAM(s) Oral daily  multivitamin 1 Tablet(s) Oral daily    MEDICATIONS  (PRN):  haloperidol     Tablet 5 milliGRAM(s) Oral every 6 hours PRN anxiety/ agitation  haloperidol    Injectable 5 milliGRAM(s) IntraMuscular every 6 hours PRN Agitation  melatonin 3 milliGRAM(s) Oral at bedtime PRN Insomnia      Vital Signs Last 24 Hrs  T(C): 36.6 (19 Aug 2022 05:23), Max: 36.8 (19 Aug 2022 01:12)  T(F): 97.9 (19 Aug 2022 05:23), Max: 98.2 (19 Aug 2022 01:12)  HR: 61 (19 Aug 2022 05:23) (61 - 69)  BP: 141/92 (19 Aug 2022 05:23) (113/81 - 141/92)  BP(mean): --  RR: 16 (19 Aug 2022 05:23) (16 - 18)  SpO2: 97% (19 Aug 2022 05:23) (97% - 99%)    Parameters below as of 19 Aug 2022 05:23  Patient On (Oxygen Delivery Method): room air      I&O's Detail      Physical Exam:  GENERAL: Lying in bed, well appearing, speaking in full sentence, in NAD  Lung: CTABL no wheezing appreciated  CVS: S1 S2 RRR no rubs, murmur appreciated  Abdomen: +BS, soft NTND  ILR  was covered with a  clear with a gauge and clear Tegaderm. It was clean, dry, and intact. No bleeding, drainage hematoma, or ecchymosis appreciated.    EXTREMITIES:  Warm, well -perfused, no pedal edema, distal pulses present    TELEMETERIC:SR HR 60-70s                            14.3   9.23  )-----------( 228      ( 18 Aug 2022 06:55 )             43.3       08-18    140  |  104  |  22  ----------------------------<  90  4.2   |  24  |  0.95    Ca    9.4      18 Aug 2022 06:55  Phos  4.0     08-18  Mg     2.10     08-18               Patient stated that he understands and speak English. Patient stated that he does not need a .    Subjective: Denies HA, lightheadedness, dizziness, CP, palpitation, SOB, and pain at the ILR site    Interval events:  - Patient was recently admitted for syncope with facial laceration - signed out AMA from [facility]  on 8/7/22 presented to ED as advised by [facility] crisis center.  -  determined the pt does not have capacity to leave AMA; placed on 1:1 monitoring for elopement risk and restarted  Abilify 5mg QHS and Haldol 5mg PO/IM PRN.   - EP consulted for syncope. Tele reviewed sinus rhythm with HR 60s-70s with short run of PAT. Echocardiogram shows normal LV function. Orthostatics were negative.  -  s/p [employer] ILR. Post-op ILR instruction has been verbally explained and given to the patient and his brother, Luis F Zambrano and sister in law Greco over the phone at [phone]. Patient was given ID card, Booklet and home monitor. Patient and brother expressed understanding and all questions were answered. A carbon copy is located in the patient chart  - Patient is schedule for an appointment on 9/2/2022 at 11:00am at 4th floor Oncology Building [phone]     MEDICATIONS  (STANDING):  ARIPiprazole 10 milliGRAM(s) Oral daily  enoxaparin Injectable 40 milliGRAM(s) SubCutaneous every 24 hours  folic acid 1 milliGRAM(s) Oral daily  multivitamin 1 Tablet(s) Oral daily    MEDICATIONS  (PRN):  haloperidol     Tablet 5 milliGRAM(s) Oral every 6 hours PRN anxiety/ agitation  haloperidol    Injectable 5 milliGRAM(s) IntraMuscular every 6 hours PRN Agitation  melatonin 3 milliGRAM(s) Oral at bedtime PRN Insomnia      Vital Signs Last 24 Hrs  T(C): 36.6 (19 Aug 2022 05:23), Max: 36.8 (19 Aug 2022 01:12)  T(F): 97.9 (19 Aug 2022 05:23), Max: 98.2 (19 Aug 2022 01:12)  HR: 61 (19 Aug 2022 05:23) (61 - 69)  BP: 141/92 (19 Aug 2022 05:23) (113/81 - 141/92)  BP(mean): --  RR: 16 (19 Aug 2022 05:23) (16 - 18)  SpO2: 97% (19 Aug 2022 05:23) (97% - 99%)    Parameters below as of 19 Aug 2022 05:23  Patient On (Oxygen Delivery Method): room air      I&O's Detail      Physical Exam:  GENERAL: Lying in bed, well appearing, speaking in full sentence, in NAD  Lung: CTABL no wheezing appreciated  CVS: S1 S2 RRR no rubs, murmur appreciated  Abdomen: +BS, soft NTND  ILR  was covered with a  clear with a gauge and clear Tegaderm. It was clean, dry, and intact. No bleeding, drainage hematoma, or ecchymosis appreciated.    EXTREMITIES:  Warm, well -perfused, no pedal edema, distal pulses present    TELEMETERIC:SR HR 60-70s                            14.3   9.23  )-----------( 228      ( 18 Aug 2022 06:55 )             43.3       08-18    140  |  104  |  22  ----------------------------<  90  4.2   |  24  |  0.95    Ca    9.4      18 Aug 2022 06:55  Phos  4.0     08-18  Mg     2.10     08-18

## 2022-08-19 NOTE — DIETITIAN INITIAL EVALUATION ADULT - PERTINENT MEDS FT
MEDICATIONS  (STANDING):  ARIPiprazole 10 milliGRAM(s) Oral daily  enoxaparin Injectable 40 milliGRAM(s) SubCutaneous every 24 hours  folic acid 1 milliGRAM(s) Oral daily  multivitamin 1 Tablet(s) Oral daily    MEDICATIONS  (PRN):  haloperidol     Tablet 5 milliGRAM(s) Oral every 6 hours PRN anxiety/ agitation  haloperidol    Injectable 5 milliGRAM(s) IntraMuscular every 6 hours PRN Agitation  melatonin 3 milliGRAM(s) Oral at bedtime PRN Insomnia

## 2022-08-19 NOTE — PROGRESS NOTE ADULT - PROBLEM SELECTOR PLAN 5
-requested assistance from [facility] pharmacist to obtain current home medications

## 2022-08-19 NOTE — BH CONSULTATION LIAISON PROGRESS NOTE - NSBHFUPINTERVALHXFT_PSY_A_CORE
Chart reviewed. No prn's given. Patient superficially cooperative, somewhat irritable,  making vague paranoid statements during an interview. Patient stated that  he came to the hospital at his brother's request, patient was  unable to provide any details of his psychiatric diagnosis, medication and his behaviors before he came to the hospital, asked the writer to call his brother to get all the information. Patient denies suicidal ideation, denies HI,  denies auditory/visual hallucinations.
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
## 2022-08-20 LAB
BASOPHILS # BLD AUTO: 0.07 K/UL — SIGNIFICANT CHANGE UP (ref 0–0.2)
BASOPHILS NFR BLD AUTO: 0.8 % — SIGNIFICANT CHANGE UP (ref 0–2)
EOSINOPHIL # BLD AUTO: 0.23 K/UL — SIGNIFICANT CHANGE UP (ref 0–0.5)
EOSINOPHIL NFR BLD AUTO: 2.7 % — SIGNIFICANT CHANGE UP (ref 0–6)
HCT VFR BLD CALC: 42.5 % — SIGNIFICANT CHANGE UP (ref 39–50)
HGB BLD-MCNC: 14.6 G/DL — SIGNIFICANT CHANGE UP (ref 13–17)
IANC: 3.23 K/UL — SIGNIFICANT CHANGE UP (ref 1.8–7.4)
IMM GRANULOCYTES NFR BLD AUTO: 0.5 % — SIGNIFICANT CHANGE UP (ref 0–1.5)
LYMPHOCYTES # BLD AUTO: 3.98 K/UL — HIGH (ref 1–3.3)
LYMPHOCYTES # BLD AUTO: 46.9 % — HIGH (ref 13–44)
MCHC RBC-ENTMCNC: 33.4 PG — SIGNIFICANT CHANGE UP (ref 27–34)
MCHC RBC-ENTMCNC: 34.4 GM/DL — SIGNIFICANT CHANGE UP (ref 32–36)
MCV RBC AUTO: 97.3 FL — SIGNIFICANT CHANGE UP (ref 80–100)
MONOCYTES # BLD AUTO: 0.94 K/UL — HIGH (ref 0–0.9)
MONOCYTES NFR BLD AUTO: 11.1 % — SIGNIFICANT CHANGE UP (ref 2–14)
NEUTROPHILS # BLD AUTO: 3.23 K/UL — SIGNIFICANT CHANGE UP (ref 1.8–7.4)
NEUTROPHILS NFR BLD AUTO: 38 % — LOW (ref 43–77)
NRBC # BLD: 0 /100 WBCS — SIGNIFICANT CHANGE UP (ref 0–0)
NRBC # FLD: 0 K/UL — SIGNIFICANT CHANGE UP (ref 0–0)
PHOSPHATE SERPL-MCNC: 3.5 MG/DL — SIGNIFICANT CHANGE UP (ref 2.5–4.5)
PLATELET # BLD AUTO: 215 K/UL — SIGNIFICANT CHANGE UP (ref 150–400)
RBC # BLD: 4.37 M/UL — SIGNIFICANT CHANGE UP (ref 4.2–5.8)
RBC # FLD: 12.4 % — SIGNIFICANT CHANGE UP (ref 10.3–14.5)
WBC # BLD: 8.49 K/UL — SIGNIFICANT CHANGE UP (ref 3.8–10.5)
WBC # FLD AUTO: 8.49 K/UL — SIGNIFICANT CHANGE UP (ref 3.8–10.5)

## 2022-08-20 PROCEDURE — 99232 SBSQ HOSP IP/OBS MODERATE 35: CPT

## 2022-08-20 RX ORDER — ARIPIPRAZOLE 15 MG/1
15 TABLET ORAL DAILY
Refills: 0 | Status: DISCONTINUED | OUTPATIENT
Start: 2022-08-20 | End: 2022-08-22

## 2022-08-20 RX ADMIN — Medication 1 TABLET(S): at 11:54

## 2022-08-20 RX ADMIN — ENOXAPARIN SODIUM 40 MILLIGRAM(S): 100 INJECTION SUBCUTANEOUS at 21:00

## 2022-08-20 RX ADMIN — Medication 1 MILLIGRAM(S): at 11:54

## 2022-08-20 RX ADMIN — ARIPIPRAZOLE 15 MILLIGRAM(S): 15 TABLET ORAL at 11:54

## 2022-08-20 NOTE — PROGRESS NOTE ADULT - ASSESSMENT
50yo Male, history of bipolar d/o, worsening behavior over the past 1-2 years per family (gambling addition, erratic moods), medication non compliance, recently admitted for syncope c/b facial laceration a/w recurrent episodes of syncope, and uncontrolled bipolar I d/o and sowmya; consult cards 8/15, syncopal work up thus far negative. Rec;s ILR from EP placed on 8/19. D/c to [facility]. Uptitrating Abilify.    Called bedboard to get patient transferred to  8/16-17.

## 2022-08-20 NOTE — PROGRESS NOTE ADULT - SUBJECTIVE AND OBJECTIVE BOX
[facility] Division of Hospital Medicine  Meghann Schwarz MD  Pager (M-F, 8A-5P): [phone]  Other Times:  [phone]      SUBJECTIVE / OVERNIGHT EVENTS: Pt angry that he is still in the hospital. Demanding to go home. Then later, apologetic. Wants to talk to psych.    MEDICATIONS  (STANDING):  ARIPiprazole 15 milliGRAM(s) Oral daily  enoxaparin Injectable 40 milliGRAM(s) SubCutaneous every 24 hours  folic acid 1 milliGRAM(s) Oral daily  multivitamin 1 Tablet(s) Oral daily    MEDICATIONS  (PRN):  haloperidol     Tablet 5 milliGRAM(s) Oral every 6 hours PRN anxiety/ agitation  haloperidol    Injectable 5 milliGRAM(s) IntraMuscular every 6 hours PRN Agitation  melatonin 3 milliGRAM(s) Oral at bedtime PRN Insomnia      I&O's Summary      PHYSICAL EXAM:  Vital Signs Last 24 Hrs  T(C): 36.7 (20 Aug 2022 12:00), Max: 36.9 (19 Aug 2022 22:04)  T(F): 98 (20 Aug 2022 12:00), Max: 98.4 (19 Aug 2022 22:04)  HR: 61 (20 Aug 2022 12:00) (59 - 66)  BP: 132/82 (20 Aug 2022 12:00) (115/63 - 132/82)  BP(mean): --  RR: 19 (20 Aug 2022 12:00) (18 - 19)  SpO2: 98% (20 Aug 2022 12:00) (98% - 100%)    Parameters below as of 20 Aug 2022 12:00  Patient On (Oxygen Delivery Method): room air      GENERAL: calm  HEAD: multiple healing lacerations, face laceration sutures clean and dry   EYES: EOMI, conjunctiva and sclera clear  NECK: Supple, No JVD  CHEST/LUNG: Clear to auscultation bilaterally; No wheeze  HEART: Regular rate and rhythm; No murmurs, rubs, or gallops  ABDOMEN: Soft, Nontender, Nondistended; Bowel sounds present  EXTREMITIES:  2+ Peripheral Pulses, No clubbing, cyanosis, or edema  PSYCH: AAOx3  NEUROLOGY: non-focal    LABS:                        14.6   8.49  )-----------( 215      ( 20 Aug 2022 05:15 )             42.5       Phos  3.5     08-20                  RADIOLOGY & ADDITIONAL TESTS:  Results Reviewed:   Imaging Personally Reviewed:  Electrocardiogram Personally Reviewed:    COORDINATION OF CARE:  Care D[facility] Consultants/Other Providers [facility]  Prior or Outpatient Records Reviewed [Y/N]:
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
## 2022-08-20 NOTE — PROGRESS NOTE ADULT - SUBJECTIVE AND OBJECTIVE BOX
Patient seen and examined at bedside.    Overnight Events: No acute events overnight. Denies chest pain or SOB. Tele reviewed with HR sinus 60s-70s, no events.      REVIEW OF SYSTEMS:  Constitutional:     [ x] negative [ ] fevers [ ] chills [ ] weight loss [ ] weight gain  HEENT:                  [ x] negative [ ] dry eyes [ ] eye irritation [ ] postnasal drip [ ] nasal congestion  CV:                         [x ] negative  [ ] chest pain [ ] orthopnea [ ] palpitations [ ] murmur  Resp:                     [ x] negative [ ] cough [ ] shortness of breath [ ] dyspnea [ ] wheezing [ ] sputum [ ]hemoptysis  GI:                          [ x] negative [ ] nausea [ ] vomiting [ ] diarrhea [ ] constipation [ ] abd pain [ ] dysphagia   :                        [ x] negative [ ] dysuria [ ] nocturia [ ] hematuria [ ] increased urinary frequency  Musculoskeletal: [ x] negative [ ] back pain [ ] myalgias [ ] arthralgias [ ] fracture  Skin:                       [ x] negative [ ] rash [ ] itch  Neurological:        [ x] negative [ ] headache [ ] dizziness [ ] syncope [ ] weakness [ ] numbness  Psychiatric:           [ x] negative [ ] anxiety [ ] depression  Endocrine:            [ x] negative [ ] diabetes [ ] thyroid problem  Heme/Lymph:      [ x] negative [ ] anemia [ ] bleeding problem  Allergic/Immune: [x ] negative [ ] itchy eyes [ ] nasal discharge [ ] hives [ ] angioedema    [x ] All other systems negative  [ ] Unable to assess ROS due to    Current Meds:  ARIPiprazole 10 milliGRAM(s) Oral daily  enoxaparin Injectable 40 milliGRAM(s) SubCutaneous every 24 hours  folic acid 1 milliGRAM(s) Oral daily  haloperidol     Tablet 5 milliGRAM(s) Oral every 6 hours PRN  haloperidol    Injectable 5 milliGRAM(s) IntraMuscular every 6 hours PRN  melatonin 3 milliGRAM(s) Oral at bedtime PRN  multivitamin 1 Tablet(s) Oral daily              Vitals:  T(F): 97.8 (08-20), Max: 98.6 (08-19)  HR: 60 (08-20) (59 - 68)  BP: 115/81 (08-20) (113/73 - 118/84)  RR: 18 (08-20)  SpO2: 100% (08-20)  I&O's Summary      Physical Exam:  Appearance: No acute distress; well appearing  Eyes: PERRL, EOMI, pink conjunctiva  HENT: Normal oral mucosa  Cardiovascular: RRR, S1, S2, no murmurs, rubs, or gallops; no edema; no JVD. ILR insertion site is c/d/i  Respiratory: Clear to auscultation bilaterally  Musculoskeletal: no edema bilaterally

## 2022-08-21 PROCEDURE — 99232 SBSQ HOSP IP/OBS MODERATE 35: CPT

## 2022-08-21 RX ADMIN — ENOXAPARIN SODIUM 40 MILLIGRAM(S): 100 INJECTION SUBCUTANEOUS at 21:34

## 2022-08-21 RX ADMIN — Medication 1 TABLET(S): at 12:02

## 2022-08-21 RX ADMIN — ARIPIPRAZOLE 15 MILLIGRAM(S): 15 TABLET ORAL at 12:02

## 2022-08-21 RX ADMIN — Medication 1 MILLIGRAM(S): at 12:02

## 2022-08-21 NOTE — PROGRESS NOTE ADULT - PROBLEM SELECTOR PLAN 5
-requested assistance from [facility] pharmacist to obtain current home medications

## 2022-08-21 NOTE — PROGRESS NOTE ADULT - SUBJECTIVE AND OBJECTIVE BOX
[facility] Division of Hospital Medicine  Meghann Schwarz MD  Pager (M-F, 8A-5P): [phone]  Other Times:  [phone]      SUBJECTIVE / OVERNIGHT EVENTS: gabi MOORE doesn't like his new room. Didn't sleep well. Wants to go home. Less verbose this am.    MEDICATIONS  (STANDING):  ARIPiprazole 15 milliGRAM(s) Oral daily  enoxaparin Injectable 40 milliGRAM(s) SubCutaneous every 24 hours  folic acid 1 milliGRAM(s) Oral daily  multivitamin 1 Tablet(s) Oral daily    MEDICATIONS  (PRN):  haloperidol     Tablet 5 milliGRAM(s) Oral every 6 hours PRN anxiety/ agitation  haloperidol    Injectable 5 milliGRAM(s) IntraMuscular every 6 hours PRN Agitation  melatonin 3 milliGRAM(s) Oral at bedtime PRN Insomnia      I&O's Summary      PHYSICAL EXAM:  Vital Signs Last 24 Hrs  T(C): 37 (21 Aug 2022 12:18), Max: 37 (21 Aug 2022 12:18)  T(F): 98.6 (21 Aug 2022 12:18), Max: 98.6 (21 Aug 2022 12:18)  HR: 68 (21 Aug 2022 12:18) (60 - 68)  BP: 135/72 (21 Aug 2022 12:18) (117/77 - 135/72)  BP(mean): --  RR: 17 (21 Aug 2022 12:18) (17 - 18)  SpO2: 99% (21 Aug 2022 12:18) (99% - 100%)    Parameters below as of 21 Aug 2022 12:18  Patient On (Oxygen Delivery Method): room air      GENERAL: calm  HEAD: multiple healing lacerations, face laceration sutures clean and dry   EYES: EOMI, conjunctiva and sclera clear  NECK: Supple, No JVD  CHEST/LUNG: Clear to auscultation bilaterally; No wheeze  HEART: Regular rate and rhythm; No murmurs, rubs, or gallops  ABDOMEN: Soft, Nontender, Nondistended; Bowel sounds present  EXTREMITIES:  2+ Peripheral Pulses, No clubbing, cyanosis, or edema  PSYCH: AAOx3  NEUROLOGY: non-focal  LABS:                        14.6   8.49  )-----------( 215      ( 20 Aug 2022 05:15 )             42.5       Phos  3.5     08-20                  RADIOLOGY & ADDITIONAL TESTS:  Results Reviewed:   Imaging Personally Reviewed:  Electrocardiogram Personally Reviewed:    COORDINATION OF CARE:  Care Discussed with Consultants/Other Providers [Y/N]:  Prior or Outpatient Records Reviewed [Y/N]:

## 2022-08-21 NOTE — PROGRESS NOTE ADULT - ASSESSMENT
52yo Male, history of bipolar d/o, worsening behavior over the past 1-2 years per family (gambling addition, erratic moods), medication non compliance, recently admitted for syncope c/b facial laceration a/w recurrent episodes of syncope, and uncontrolled bipolar I d/o and sowmya; consult cards 8/15, syncopal work up thus far negative. Rec;s ILR from EP placed on 8/19. D/c to [facility]. Uptitrating Abilify.    Called bedboard to get patient transferred to  8/16-17.

## 2022-08-22 PROCEDURE — 99232 SBSQ HOSP IP/OBS MODERATE 35: CPT

## 2022-08-22 PROCEDURE — 99233 SBSQ HOSP IP/OBS HIGH 50: CPT

## 2022-08-22 RX ORDER — ARIPIPRAZOLE 15 MG/1
17.5 TABLET ORAL DAILY
Refills: 0 | Status: DISCONTINUED | OUTPATIENT
Start: 2022-08-22 | End: 2022-08-25

## 2022-08-22 RX ADMIN — Medication 1 MILLIGRAM(S): at 10:46

## 2022-08-22 RX ADMIN — ARIPIPRAZOLE 15 MILLIGRAM(S): 15 TABLET ORAL at 10:46

## 2022-08-22 RX ADMIN — Medication 1 TABLET(S): at 10:45

## 2022-08-22 NOTE — BH CONSULTATION LIAISON PROGRESS NOTE - NSBHFUPINTERVALHXFT_PSY_A_CORE
patient hyperverbal, redirectable, pacing around with no insight into illness, discussed with prior psychiatrist patient slated to go to [facility] family not comfortable with him going home, patient indeed seen yelling loudly at family over phone

## 2022-08-22 NOTE — PROGRESS NOTE ADULT - SUBJECTIVE AND OBJECTIVE BOX
Hospitalist Progress Note  Elsy Zayas MD Pager # [phone]    OVERNIGHT EVENTS: MAX    SUBJECTIVE / INTERVAL HPI: Patient seen and examined at bedside. Denies dizziness/lightheadedness. All other ROS negative.     VITAL SIGNS:  Vital Signs Last 24 Hrs  T(C): 36.7 (22 Aug 2022 10:42), Max: 36.8 (21 Aug 2022 21:16)  T(F): 98.1 (22 Aug 2022 10:42), Max: 98.3 (21 Aug 2022 21:16)  HR: 64 (22 Aug 2022 10:42) (64 - 73)  BP: 130/89 (22 Aug 2022 10:42) (109/65 - 130/89)  BP(mean): --  RR: 18 (22 Aug 2022 10:42) (18 - 18)  SpO2: 99% (22 Aug 2022 10:42) (98% - 99%)    Parameters below as of 22 Aug 2022 10:42  Patient On (Oxygen Delivery Method): room air        PHYSICAL EXAM:    General: WDWN male resting in bed  HEENT: NC/AT; PERRL, anicteric sclera; MMM  Neck: supple  Cardiovascular: +S1/S2; RRR  Respiratory: CTA B/L; no W/R/R  Gastrointestinal: soft, NT/ND; +BSx4  Extremities: WWP; no edema, clubbing or cyanosis  Vascular: 2+ radial, DP/PT pulses B/L  Neurological: AAOx3; no focal deficits    MEDICATIONS:  MEDICATIONS  (STANDING):  ARIPiprazole 15 milliGRAM(s) Oral daily  enoxaparin Injectable 40 milliGRAM(s) SubCutaneous every 24 hours  folic acid 1 milliGRAM(s) Oral daily  multivitamin 1 Tablet(s) Oral daily    MEDICATIONS  (PRN):  haloperidol     Tablet 5 milliGRAM(s) Oral every 6 hours PRN anxiety/ agitation  haloperidol    Injectable 5 milliGRAM(s) IntraMuscular every 6 hours PRN Agitation  melatonin 3 milliGRAM(s) Oral at bedtime PRN Insomnia      ALLERGIES:  Allergies    penicillin (Hives)  penicillins (Rash)    Intolerances        LABS:              CAPILLARY BLOOD GLUCOSE          RADIOLOGY & ADDITIONAL TESTS: Reviewed.    ASSESSMENT:    PLAN:

## 2022-08-22 NOTE — PROGRESS NOTE ADULT - ASSESSMENT
50yo Male, history of bipolar d/o, worsening behavior over the past 1-2 years per family (gambling addition, erratic moods), medication non compliance, recently admitted for syncope c/b facial laceration a/w recurrent episodes of syncope, and uncontrolled bipolar I d/o and sowmya; consult cards 8/15, syncopal work up thus far negative. Rec;s ILR from EP placed on 8/19. D/c to [facility]. Uptitrating Abilify.    Called bedboard to get patient transferred to  8/16-17.
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
## 2022-08-23 PROCEDURE — 99232 SBSQ HOSP IP/OBS MODERATE 35: CPT

## 2022-08-23 PROCEDURE — 99233 SBSQ HOSP IP/OBS HIGH 50: CPT

## 2022-08-23 RX ADMIN — ARIPIPRAZOLE 17.5 MILLIGRAM(S): 15 TABLET ORAL at 11:00

## 2022-08-23 RX ADMIN — Medication 1 TABLET(S): at 11:00

## 2022-08-23 RX ADMIN — Medication 1 MILLIGRAM(S): at 11:01

## 2022-08-23 NOTE — PROGRESS NOTE ADULT - ASSESSMENT
52yo Male, history of bipolar d/o, worsening behavior over the past 1-2 years per family (gambling addition, erratic moods), medication non compliance, recently admitted for syncope c/b facial laceration a/w recurrent episodes of syncope, and uncontrolled bipolar I d/o and sowmya; consult cards 8/15, syncopal work up thus far negative. Rec;s ILR from EP placed on 8/19. D/c to [facility]. Uptitrating Abilify.    Called bedboard to get patient transferred to  8/16-17.
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
## 2022-08-23 NOTE — BH CONSULTATION LIAISON PROGRESS NOTE - NSBHFUPINTERVALHXFT_PSY_A_CORE
Patient states he wants to go home, no insight into illness, irritable on exam    Spoke to brother Virgilio and his wife - both state that patient has been calling them incessantly at all hours of the day and night, has been calling anmol, speaking nonsense and making odd plans, still seems delusional/nonsensical, at first on phone patient is calm but then immediately becomes aggressive, threatening, agitated over the phone towards family. Family states that ex-wife is not fully aware of situation. They do not feel that patient has improved with medication, continue to feel that patient needs psychiatric admission. Also are concerned about noncompliance.

## 2022-08-23 NOTE — PROGRESS NOTE ADULT - SUBJECTIVE AND OBJECTIVE BOX
Hospitalist Progress Note  Elsy Zayas MD Pager # [phone]    OVERNIGHT EVENTS: MAX     SUBJECTIVE / INTERVAL HPI: Patient seen and examined at bedside. Pt. is comfortable. No complaints today. Reporting that he just walked to the sink to wash his face and feels much better. He is excited about going home to [place].     VITAL SIGNS:  Vital Signs Last 24 Hrs  T(C): 36.9 (23 Aug 2022 10:55), Max: 36.9 (23 Aug 2022 10:55)  T(F): 98.5 (23 Aug 2022 10:55), Max: 98.5 (23 Aug 2022 10:55)  HR: 66 (23 Aug 2022 10:55) (66 - 73)  BP: 128/87 (23 Aug 2022 10:55) (125/84 - 128/87)  BP(mean): --  RR: 18 (23 Aug 2022 10:55) (18 - 18)  SpO2: 99% (23 Aug 2022 10:55) (99% - 99%)    Parameters below as of 23 Aug 2022 10:55  Patient On (Oxygen Delivery Method): room air        PHYSICAL EXAM:    General: WDWN male  - sitting up in bed   HEENT: NC/AT; PERRL, anicteric sclera; MMM  Neck: supple  Cardiovascular: +S1/S2; RRR  Respiratory: CTA B/L; no W/R/R  Gastrointestinal: soft, NT/ND; +BSx4  Extremities: WWP; no edema, clubbing or cyanosis  Vascular: 2+ radial, DP/PT pulses B/L  Neurological: AAOx3; no focal deficits    MEDICATIONS:  MEDICATIONS  (STANDING):  ARIPiprazole 17.5 milliGRAM(s) Oral daily  enoxaparin Injectable 40 milliGRAM(s) SubCutaneous every 24 hours  folic acid 1 milliGRAM(s) Oral daily  multivitamin 1 Tablet(s) Oral daily    MEDICATIONS  (PRN):  haloperidol     Tablet 5 milliGRAM(s) Oral every 6 hours PRN anxiety/ agitation  haloperidol    Injectable 5 milliGRAM(s) IntraMuscular every 6 hours PRN Agitation  melatonin 3 milliGRAM(s) Oral at bedtime PRN Insomnia      ALLERGIES:  Allergies    penicillin (Hives)  penicillins (Rash)    Intolerances        LABS:              CAPILLARY BLOOD GLUCOSE          RADIOLOGY & ADDITIONAL TESTS: Reviewed.    ASSESSMENT:    PLAN:

## 2022-08-24 ENCOUNTER — TRANSCRIPTION ENCOUNTER (OUTPATIENT)
Age: 52
End: 2022-08-24

## 2022-08-24 LAB — SARS-COV-2 RNA SPEC QL NAA+PROBE: SIGNIFICANT CHANGE UP

## 2022-08-24 PROCEDURE — 99232 SBSQ HOSP IP/OBS MODERATE 35: CPT

## 2022-08-24 RX ADMIN — ARIPIPRAZOLE 17.5 MILLIGRAM(S): 15 TABLET ORAL at 11:58

## 2022-08-24 RX ADMIN — Medication 1 TABLET(S): at 11:58

## 2022-08-24 RX ADMIN — Medication 1 MILLIGRAM(S): at 11:59

## 2022-08-24 RX ADMIN — Medication 3 MILLIGRAM(S): at 22:35

## 2022-08-24 RX ADMIN — ENOXAPARIN SODIUM 40 MILLIGRAM(S): 100 INJECTION SUBCUTANEOUS at 22:54

## 2022-08-24 NOTE — CHART NOTE - NSCHARTNOTEFT_GEN_A_CORE
patient walking around unit anxiously talking on phone, does not want to engage wishes to speak to SW    Please increase psych meds as per psych assessment 8/23  Cannot leave AMA, cannot go home given family's safety concerns  Awaiting [facility] admission, would extend psych bed search further to other institutions as well, patient does not require sub-specialty cohorting

## 2022-08-24 NOTE — PROGRESS NOTE ADULT - PROBLEM SELECTOR PLAN 5
-requested assistance from [facility] pharmacist to obtain current home medications

## 2022-08-24 NOTE — PROGRESS NOTE ADULT - ASSESSMENT
52yo Male, history of bipolar d/o, worsening behavior over the past 1-2 years per family (gambling addition, erratic moods), medication non compliance, recently admitted for syncope c/b facial laceration a/w recurrent episodes of syncope, and uncontrolled bipolar I d/o and sowmya; consult cards 8/15, syncopal work up thus far negative. Rec;s ILR from EP placed on 8/19. D/c to [facility]. Uptitrating Abilify.    Called bedboard to get patient transferred to  8/16-17.

## 2022-08-24 NOTE — DISCHARGE NOTE PROVIDER - NSDCMRMEDTOKEN_GEN_ALL_CORE_FT
Abilify 5 mg oral tablet: 1 tab(s) orally once a day (Filled 8/8/22 x 30 days)  clindamycin 300 mg oral capsule: 1 cap(s) orally every 6 hours x 6 day supply filled on 8/8/22  clonazePAM 0.5 mg oral tablet: 1 tab(s) orally once a day (at bedtime) (Last filled 5/13/22 x 30 days).   mirtazapine 30 mg oral tablet: 1 tab(s) orally once a day (at bedtime) (Last filled 8/8/22 x 30 days).   SEROquel 300 mg oral tablet: 1 tab(s) orally once a day (at bedtime) (Last filled 7/7/22 x 30 days).   tamsulosin 0.4 mg oral capsule: 1 cap(s) orally once a day (Last filled April/2022 x 1 month).   Zoloft 100 mg oral tablet: 2 tab(s) orally once a day (Last filled 8/8/22 x 1 month).    ARIPiprazole 20 mg oral tablet: 1 tab(s) orally once a day  clotrimazole 1% topical cream: 1 application topically 2 times a day  divalproex sodium 500 mg oral delayed release tablet: 1 tab(s) orally once a day (at bedtime)  folic acid 1 mg oral tablet: 1 tab(s) orally once a day  haloperidol: 5 milligram(s) intramuscular every 6 hours, As Needed for agitaiton  haloperidol 5 mg oral tablet: 1 tab(s) orally every 6 hours, As needed, anxiety/ agitation  melatonin 3 mg oral tablet: 1 tab(s) orally once a day (at bedtime), As needed, Insomnia  Multiple Vitamins oral tablet: 1 tab(s) orally once a day   Abilify Maintena 400 mg intramuscular injection, extended release: 400 milligram(s) intramuscular once a month  Next due 9/28  ARIPiprazole 20 mg oral tablet: 1 tab(s) orally once a day- last dose 9/12  clonazePAM 1 mg oral tablet: 1 tab(s) orally once a day (at bedtime) MDD:1mg  Depakote  mg oral tablet, extended release: 1 tab(s) orally once a day (at bedtime)   Depakote  mg oral tablet, extended release: 3 tab(s) orally once a day (at bedtime)   folic acid 1 mg oral tablet: 1 tab(s) orally once a day  ibuprofen 600 mg oral tablet: 1 tab(s) orally every 8 hours, As needed  Take w/ food  Multiple Vitamins oral tablet: 1 tab(s) orally once a day

## 2022-08-24 NOTE — DISCHARGE NOTE PROVIDER - CARE PROVIDER_API CALL
Dale Briceño (DO)  Medicine  [facility], [address]  [place] [address]  Phone: [phone]  Fax: [phone]  Follow Up Time: 1 week

## 2022-08-24 NOTE — DISCHARGE NOTE PROVIDER - NSFOLLOWUPCLINICS_GEN_ALL_ED_FT
[facility] Psychiatry  Psychiatry  [address] [place] [address]  Phone: [phone]  Fax:

## 2022-08-24 NOTE — DISCHARGE NOTE PROVIDER - NSDCFUSCHEDAPPT_GEN_ALL_CORE_FT
[facility] Physician [facility] [phone]  Scheduled Appointment: 09/02/2022

## 2022-08-24 NOTE — DISCHARGE NOTE PROVIDER - NSDCCPCAREPLAN_GEN_ALL_CORE_FT
PRINCIPAL DISCHARGE DIAGNOSIS  Diagnosis: Syncope  Assessment and Plan of Treatment: You were seen by cardiology and a loop recorder was placed to monitor for any heart rate abnormalities.   Upon discharge from the hospital please be sure to eat and drink adequately to maintain a stable blood pressure. Follow-up with your primary care provider as outpatient within 1-2 weeks to further monitor your blood-work and blood pressure.   Maintain a safe environment and avoid standing up too quickly in order to prevent falls. Be aware of presistent dizziness, nausea/vomiting, fainting, changes in heart rate/blood pressure, and similar events, and return to emergency department if such signs/symptoms persist.      SECONDARY DISCHARGE DIAGNOSES  Diagnosis: Bipolar disorder  Assessment and Plan of Treatment: You are being transferred to [facility] for further care. Please continue recommended medications/therapy.     PRINCIPAL DISCHARGE DIAGNOSIS  Diagnosis: Syncope  Assessment and Plan of Treatment: You were seen by cardiology and a loop recorder was placed to monitor for any heart rate abnormalities.   Upon discharge from the hospital please be sure to eat and drink adequately to maintain a stable blood pressure. Follow-up with your primary care provider as outpatient within 1-2 weeks to further monitor your blood-work and blood pressure.   Maintain a safe environment and avoid standing up too quickly in order to prevent falls. Be aware of presistent dizziness, nausea/vomiting, fainting, changes in heart rate/blood pressure, and similar events, and return to emergency department if such signs/symptoms persist.      SECONDARY DISCHARGE DIAGNOSES  Diagnosis: Bipolar disorder  Assessment and Plan of Treatment: You are being transferred to [facility] for further care. Please continue recommended medications/therapy.  Depakote 500mg at bedtime  Aripiprazole 20mg daily.

## 2022-08-24 NOTE — DISCHARGE NOTE PROVIDER - HOSPITAL COURSE
51M h/o bipolar d/o w/ medication non-compliance, ETOH abuse, LLE swelling, recently admitted for syncope c/b facial laceration a/w recurrent episodes of syncope and sowmya    Bipolar disorder w/ acute sowmya  - Presenting manic with constricted affect, hyperactive, poor insight to own medical problems, pressured rate of speech  - Pt does not have capacity to leave AMA needs [facility] admission per psychiatry for elevated risk for inadvertent injury to self/others due to exacerbation and intensity of symptoms  - Patient has been non complaint with medication; Restart Abilify 5mg QHS increased to 15 mg daily; Haldol 5mg PO/IM PRN.   - Considered frontotemporal dementia last MRI in 2014 was not c/w diagnosis nor was his CT head this admission.    Syncope.   - Unclear etiology, however, given sudden onset of episodes c/b trauma c/f cardiac arrhythmia vs CA stenosis  - EP c/s for ILR given 3 episodes of LOC c/b facial trauma (poor candidate for Holter monitor given uncontrolled psych d/o)  - EKG: QTc WNL; CE negative, TSH normal; ECHO w/ normal LV/RV size/function; Orthostatics negative   - VA Duplex Carotid Arteries no significant stenosis noted; Fall, seizure precautions, PT no needs  - Started on Midodrine 5mg TID BP now stable without this medication     Alcohol abuse hx w/ intoxication w/ blood level 11 upon admission placed on symptom trigger CIWA; No severe withdrawal noted    Swelling of left lower extremity (Resolved) US Duplex: No evidence of DVT in either lower extremity    Dispo - [facility] 51M h/o bipolar d/o w/ medication non-compliance, ETOH abuse, LLE swelling, recently admitted for syncope c/b facial laceration a/w recurrent episodes of syncope and sowmya    Bipolar disorder w/ acute sowmya  - Presenting manic with constricted affect, hyperactive, poor insight to own medical problems, pressured rate of speech  - Pt does not have capacity to leave AMA needs [facility] admission per psychiatry for elevated risk for inadvertent injury to self/others due to exacerbation and intensity of symptoms  - Patient has been non complaint with medication; Restart Abilify 5mg QHS increased to 20 mg daily, and began Depakote 500mg qhs; Haldol 5mg PO/IM PRN.   - Considered frontotemporal dementia last MRI in 2014 was not c/w diagnosis nor was his CT head this admission.    Syncope.   - Unclear etiology, however, given sudden onset of episodes c/b trauma c/f cardiac arrhythmia vs CA stenosis  - EP c/s for ILR given 3 episodes of LOC c/b facial trauma (poor candidate for Holter monitor given uncontrolled psych d/o)  - EKG: QTc WNL; CE negative, TSH normal; ECHO w/ normal LV/RV size/function; Orthostatics negative   - VA Duplex Carotid Arteries no significant stenosis noted; Fall, seizure precautions, PT no needs  - Started on Midodrine 5mg TID BP now stable without this medication, discontinued.  - Pt is S/P ILR on 8/19    Alcohol abuse hx w/ intoxication w/ blood level 11 upon admission placed on symptom trigger CIWA; No severe withdrawal noted    Swelling of left lower extremity (Resolved) US Duplex: No evidence of DVT in either lower extremity    Dispo - [facility]

## 2022-08-24 NOTE — PROGRESS NOTE ADULT - SUBJECTIVE AND OBJECTIVE BOX
Hospitalist Progress Note  Elsy Zayas MD Pager # [phone]    OVERNIGHT EVENTS: MAX    SUBJECTIVE / INTERVAL HPI: Patient seen and examined at bedside. Comfortable. No complaints. Not dizzy or lightheaded.     VITAL SIGNS:  Vital Signs Last 24 Hrs  T(C): 36.8 (24 Aug 2022 05:04), Max: 36.8 (24 Aug 2022 05:04)  T(F): 98.2 (24 Aug 2022 05:04), Max: 98.2 (24 Aug 2022 05:04)  HR: 86 (24 Aug 2022 05:04) (67 - 86)  BP: 116/75 (24 Aug 2022 05:04) (116/75 - 128/76)  BP(mean): --  RR: 18 (24 Aug 2022 05:04) (18 - 18)  SpO2: 100% (24 Aug 2022 05:04) (99% - 100%)    Parameters below as of 24 Aug 2022 05:04  Patient On (Oxygen Delivery Method): room air        PHYSICAL EXAM:    General: WDWN male resting in bed   HEENT: NC/AT; PERRL, anicteric sclera; MMM  Neck: supple  Cardiovascular: +S1/S2; RRR  Respiratory: CTA B/L; no W/R/R  Gastrointestinal: soft, NT/ND; +BSx4  Extremities: WWP; no edema, clubbing or cyanosis  Vascular: 2+ radial, DP/PT pulses B/L  Neurological: AAOx3; no focal deficits    MEDICATIONS:  MEDICATIONS  (STANDING):  ARIPiprazole 17.5 milliGRAM(s) Oral daily  enoxaparin Injectable 40 milliGRAM(s) SubCutaneous every 24 hours  folic acid 1 milliGRAM(s) Oral daily  multivitamin 1 Tablet(s) Oral daily    MEDICATIONS  (PRN):  haloperidol     Tablet 5 milliGRAM(s) Oral every 6 hours PRN anxiety/ agitation  haloperidol    Injectable 5 milliGRAM(s) IntraMuscular every 6 hours PRN Agitation  melatonin 3 milliGRAM(s) Oral at bedtime PRN Insomnia      ALLERGIES:  Allergies    penicillin (Hives)  penicillins (Rash)    Intolerances        LABS:              CAPILLARY BLOOD GLUCOSE          RADIOLOGY & ADDITIONAL TESTS: Reviewed.    ASSESSMENT:    PLAN:

## 2022-08-25 LAB — SARS-COV-2 RNA SPEC QL NAA+PROBE: SIGNIFICANT CHANGE UP

## 2022-08-25 PROCEDURE — 99232 SBSQ HOSP IP/OBS MODERATE 35: CPT

## 2022-08-25 RX ORDER — ARIPIPRAZOLE 15 MG/1
20 TABLET ORAL DAILY
Refills: 0 | Status: DISCONTINUED | OUTPATIENT
Start: 2022-08-25 | End: 2022-08-26

## 2022-08-25 RX ADMIN — ARIPIPRAZOLE 20 MILLIGRAM(S): 15 TABLET ORAL at 13:09

## 2022-08-25 RX ADMIN — Medication 1 APPLICATION(S): at 18:20

## 2022-08-25 RX ADMIN — Medication 1 MILLIGRAM(S): at 13:06

## 2022-08-25 RX ADMIN — ENOXAPARIN SODIUM 40 MILLIGRAM(S): 100 INJECTION SUBCUTANEOUS at 23:32

## 2022-08-25 RX ADMIN — Medication 1 TABLET(S): at 13:05

## 2022-08-25 NOTE — PROGRESS NOTE ADULT - ASSESSMENT
52yo Male, history of bipolar d/o, worsening behavior over the past 1-2 years per family (gambling addition, erratic moods), medication non compliance, recently admitted for syncope c/b facial laceration a/w recurrent episodes of syncope, and uncontrolled bipolar I d/o and sowmya; consult cards 8/15, syncopal work up thus far negative. Rec;s ILR from EP placed on 8/19. D/c to [facility]. Uptitrating Abilify.    Called bedboard to get patient transferred to  8/16-17.

## 2022-08-25 NOTE — PROGRESS NOTE ADULT - SUBJECTIVE AND OBJECTIVE BOX
Hospitalist Progress Note  Elsy Zayas MD Pager # [phone]    OVERNIGHT EVENTS: MAX    SUBJECTIVE / INTERVAL HPI: Patient seen and examined at bedside. Pt. feels well and wants to go home. He does not understand why he has to stay in the hospital. He denies dizziness/lightheadedness.     VITAL SIGNS:  Vital Signs Last 24 Hrs  T(C): 36.4 (25 Aug 2022 05:30), Max: 37.1 (24 Aug 2022 13:49)  T(F): 97.5 (25 Aug 2022 05:30), Max: 98.7 (24 Aug 2022 13:49)  HR: 60 (25 Aug 2022 05:30) (60 - 82)  BP: 118/77 (25 Aug 2022 05:30) (118/77 - 125/71)  BP(mean): 71 (25 Aug 2022 05:30) (71 - 71)  RR: 16 (25 Aug 2022 05:30) (16 - 18)  SpO2: 100% (25 Aug 2022 05:30) (100% - 100%)    Parameters below as of 25 Aug 2022 05:30  Patient On (Oxygen Delivery Method): room air        PHYSICAL EXAM:    General: WDWN male resting in bed   HEENT: NC/AT; PERRL, anicteric sclera; MMM  Neck: supple  Cardiovascular: +S1/S2; RRR  Respiratory: CTA B/L; no W/R/R  Gastrointestinal: soft, NT/ND; +BSx4  Extremities: WWP; no edema, clubbing or cyanosis  Vascular: 2+ radial, DP/PT pulses B/L  Neurological: AAOx3; no focal deficits    MEDICATIONS:  MEDICATIONS  (STANDING):  ARIPiprazole 20 milliGRAM(s) Oral daily  clotrimazole 1% Cream 1 Application(s) Topical two times a day  enoxaparin Injectable 40 milliGRAM(s) SubCutaneous every 24 hours  folic acid 1 milliGRAM(s) Oral daily  multivitamin 1 Tablet(s) Oral daily    MEDICATIONS  (PRN):  haloperidol     Tablet 5 milliGRAM(s) Oral every 6 hours PRN anxiety/ agitation  haloperidol    Injectable 5 milliGRAM(s) IntraMuscular every 6 hours PRN Agitation  melatonin 3 milliGRAM(s) Oral at bedtime PRN Insomnia      ALLERGIES:  Allergies    penicillin (Hives)  penicillins (Rash)    Intolerances        LABS:              CAPILLARY BLOOD GLUCOSE          RADIOLOGY & ADDITIONAL TESTS: Reviewed.    ASSESSMENT:    PLAN:

## 2022-08-26 ENCOUNTER — INPATIENT (INPATIENT)
Facility: HOSPITAL | Age: 52
LOS: 11 days | Discharge: HOME | End: 2022-09-07
Attending: PSYCHIATRY & NEUROLOGY | Admitting: PSYCHIATRY & NEUROLOGY

## 2022-08-26 ENCOUNTER — TRANSCRIPTION ENCOUNTER (OUTPATIENT)
Age: 52
End: 2022-08-26

## 2022-08-26 VITALS — HEIGHT: 71 IN | WEIGHT: 210.1 LBS | TEMPERATURE: 98 F | RESPIRATION RATE: 18 BRPM

## 2022-08-26 VITALS
RESPIRATION RATE: 17 BRPM | SYSTOLIC BLOOD PRESSURE: 122 MMHG | TEMPERATURE: 98 F | HEART RATE: 70 BPM | DIASTOLIC BLOOD PRESSURE: 67 MMHG | OXYGEN SATURATION: 100 %

## 2022-08-26 DIAGNOSIS — F33.2 MAJOR DEPRESSIVE DISORDER, RECURRENT SEVERE WITHOUT PSYCHOTIC FEATURES: ICD-10-CM

## 2022-08-26 PROCEDURE — 99233 SBSQ HOSP IP/OBS HIGH 50: CPT

## 2022-08-26 PROCEDURE — 99239 HOSP IP/OBS DSCHRG MGMT >30: CPT

## 2022-08-26 PROCEDURE — 99222 1ST HOSP IP/OBS MODERATE 55: CPT

## 2022-08-26 RX ORDER — DIPHENHYDRAMINE HCL 50 MG
50 CAPSULE ORAL EVERY 4 HOURS
Refills: 0 | Status: DISCONTINUED | OUTPATIENT
Start: 2022-08-26 | End: 2022-09-07

## 2022-08-26 RX ORDER — HALOPERIDOL DECANOATE 100 MG/ML
1 INJECTION INTRAMUSCULAR
Qty: 0 | Refills: 0 | DISCHARGE
Start: 2022-08-26

## 2022-08-26 RX ORDER — CLONAZEPAM 1 MG
1 TABLET ORAL
Qty: 0 | Refills: 0 | DISCHARGE

## 2022-08-26 RX ORDER — ARIPIPRAZOLE 15 MG/1
1 TABLET ORAL
Qty: 0 | Refills: 0 | DISCHARGE
Start: 2022-08-26

## 2022-08-26 RX ORDER — HALOPERIDOL DECANOATE 100 MG/ML
5 INJECTION INTRAMUSCULAR ONCE
Refills: 0 | Status: DISCONTINUED | OUTPATIENT
Start: 2022-08-26 | End: 2022-09-07

## 2022-08-26 RX ORDER — TAMSULOSIN HYDROCHLORIDE 0.4 MG/1
1 CAPSULE ORAL
Qty: 0 | Refills: 0 | DISCHARGE

## 2022-08-26 RX ORDER — DIVALPROEX SODIUM 500 MG/1
500 TABLET, DELAYED RELEASE ORAL AT BEDTIME
Refills: 0 | Status: DISCONTINUED | OUTPATIENT
Start: 2022-08-26 | End: 2022-08-26

## 2022-08-26 RX ORDER — LANOLIN ALCOHOL/MO/W.PET/CERES
1 CREAM (GRAM) TOPICAL
Qty: 0 | Refills: 0 | DISCHARGE
Start: 2022-08-26

## 2022-08-26 RX ORDER — ARIPIPRAZOLE 15 MG/1
20 TABLET ORAL DAILY
Refills: 0 | Status: DISCONTINUED | OUTPATIENT
Start: 2022-08-27 | End: 2022-09-07

## 2022-08-26 RX ORDER — HALOPERIDOL DECANOATE 100 MG/ML
5 INJECTION INTRAMUSCULAR
Qty: 0 | Refills: 0 | DISCHARGE
Start: 2022-08-26

## 2022-08-26 RX ORDER — DIPHENHYDRAMINE HCL 50 MG
50 CAPSULE ORAL ONCE
Refills: 0 | Status: DISCONTINUED | OUTPATIENT
Start: 2022-08-26 | End: 2022-09-07

## 2022-08-26 RX ORDER — MIRTAZAPINE 45 MG/1
1 TABLET, ORALLY DISINTEGRATING ORAL
Qty: 0 | Refills: 0 | DISCHARGE

## 2022-08-26 RX ORDER — DIVALPROEX SODIUM 500 MG/1
500 TABLET, DELAYED RELEASE ORAL
Refills: 0 | Status: DISCONTINUED | OUTPATIENT
Start: 2022-08-26 | End: 2022-08-31

## 2022-08-26 RX ORDER — ARIPIPRAZOLE 15 MG/1
1 TABLET ORAL
Qty: 0 | Refills: 0 | DISCHARGE

## 2022-08-26 RX ORDER — QUETIAPINE FUMARATE 200 MG/1
1 TABLET, FILM COATED ORAL
Qty: 0 | Refills: 0 | DISCHARGE

## 2022-08-26 RX ORDER — HALOPERIDOL DECANOATE 100 MG/ML
5 INJECTION INTRAMUSCULAR EVERY 6 HOURS
Refills: 0 | Status: DISCONTINUED | OUTPATIENT
Start: 2022-08-26 | End: 2022-09-07

## 2022-08-26 RX ORDER — DIVALPROEX SODIUM 500 MG/1
1 TABLET, DELAYED RELEASE ORAL
Qty: 0 | Refills: 0 | DISCHARGE
Start: 2022-08-26

## 2022-08-26 RX ORDER — SERTRALINE 25 MG/1
2 TABLET, FILM COATED ORAL
Qty: 0 | Refills: 0 | DISCHARGE

## 2022-08-26 RX ORDER — FOLIC ACID 0.8 MG
1 TABLET ORAL
Qty: 0 | Refills: 0 | DISCHARGE
Start: 2022-08-26

## 2022-08-26 RX ADMIN — Medication 1 TABLET(S): at 12:33

## 2022-08-26 RX ADMIN — ARIPIPRAZOLE 20 MILLIGRAM(S): 15 TABLET ORAL at 12:34

## 2022-08-26 RX ADMIN — Medication 1 APPLICATION(S): at 07:56

## 2022-08-26 RX ADMIN — Medication 1 MILLIGRAM(S): at 12:34

## 2022-08-26 RX ADMIN — Medication 2 MILLIGRAM(S): at 20:00

## 2022-08-26 RX ADMIN — DIVALPROEX SODIUM 500 MILLIGRAM(S): 500 TABLET, DELAYED RELEASE ORAL at 20:00

## 2022-08-26 NOTE — BH CONSULTATION LIAISON PROGRESS NOTE - NSBHFUPINTERVALCCFT_PSY_A_CORE
Patient seen for f/u of sowmya
Patient states, "I feel well."
" I am feeling better today"
Patient states, "I feel well."
Patient states, "I came her for my brother."
" Just talk to my brother, I am fine"
I'm fine"
" I am feeling upset, they gave me meds."
I'm fine"
I'm fine"

## 2022-08-26 NOTE — BH INPATIENT PSYCHIATRY ASSESSMENT NOTE - RISK ASSESSMENT
Risk factors: h/o psych admissions, noncompliant with treatment, h/o SA    Protective factors: no current SIIP/HIIP, no access to weapons, social supports, engaged in treatment    Overall, pt is a low risk of harm to self/others and does not meet criteria for psychiatric admission. Patient is at low risk since in context of this current hospitalization  Modifiable risk factors include: insomnia, agitation, impulsivity, active mood episode, and noncompliance to treatment.  Chronic risk factors for suicide include: h/o prior psychiatric admissions, diagnosis of bipolar d/o, and prior suicide attempts.   Protective factors include: no access to weapons, engagement in treatment, future orientation, and social supports.

## 2022-08-26 NOTE — BH CONSULTATION LIAISON PROGRESS NOTE - NSBHFUPREASONCONS_PSY_A_CORE
other...
med management/other...
med management
med management
other...
med management/other...
med management
med management/other...
med management/other...
other...

## 2022-08-26 NOTE — BH INPATIENT PSYCHIATRY ASSESSMENT NOTE - OTHER PAST PSYCHIATRIC HISTORY (INCLUDE DETAILS REGARDING ONSET, COURSE OF ILLNESS, INPATIENT/OUTPATIENT TREATMENT)
Bipolar I vs MDD with psychotic features  3 prior psychiatric hospitalizations, last hospitalization over 9 years ago  1 prior suicide attempt via OD, 1 self-aborted attempt via jumping off a bridge  Medications: abilify, zoloft, mirtazapine (doses unknown), noncompliant  Outpatient psychiatry with Jeovanny Narvaez   per C/L assessment: Bipolar I vs MDD with psychotic features  3 prior psychiatric hospitalizations, last hospitalization over 9 years ago  1 prior suicide attempt via OD, 1 self-aborted attempt via jumping off a bridge  Medications: abilify, zoloft, mirtazapine (doses unknown), noncompliant  Outpatient psychiatry with Jeovanny Narvaez

## 2022-08-26 NOTE — BH CONSULTATION LIAISON PROGRESS NOTE - NSBHCHARTREVIEWVS_PSY_A_CORE FT
Vital Signs Last 24 Hrs  T(C): 36.8 (17 Aug 2022 12:19), Max: 36.9 (16 Aug 2022 18:19)  T(F): 98.3 (17 Aug 2022 12:19), Max: 98.5 (16 Aug 2022 18:19)  HR: 60 (17 Aug 2022 12:19) (60 - 72)  BP: 125/80 (17 Aug 2022 12:19) (100/75 - 133/69)  BP(mean): --  RR: 18 (17 Aug 2022 12:19) (18 - 18)  SpO2: 99% (17 Aug 2022 12:19) (98% - 99%)    Parameters below as of 17 Aug 2022 12:19  Patient On (Oxygen Delivery Method): room air    
Vital Signs Last 24 Hrs  T(C): 36.8 (13 Aug 2022 12:20), Max: 36.9 (13 Aug 2022 05:09)  T(F): 98.2 (13 Aug 2022 12:20), Max: 98.5 (13 Aug 2022 05:09)  HR: 59 (13 Aug 2022 12:20) (52 - 62)  BP: 118/81 (13 Aug 2022 12:20) (100/60 - 143/94)  BP(mean): --  RR: 13 (13 Aug 2022 12:20) (13 - 18)  SpO2: 99% (13 Aug 2022 12:20) (98% - 100%)    Parameters below as of 13 Aug 2022 12:20  Patient On (Oxygen Delivery Method): room air    
Vital Signs Last 24 Hrs  T(C): 36.4 (15 Aug 2022 12:00), Max: 36.7 (14 Aug 2022 18:01)  T(F): 97.5 (15 Aug 2022 12:00), Max: 98 (14 Aug 2022 18:01)  HR: 66 (15 Aug 2022 12:00) (65 - 74)  BP: 131/81 (15 Aug 2022 12:00) (117/78 - 131/81)  BP(mean): --  RR: 17 (15 Aug 2022 12:00) (17 - 18)  SpO2: 98% (15 Aug 2022 12:00) (96% - 99%)    Parameters below as of 15 Aug 2022 12:00  Patient On (Oxygen Delivery Method): room air    
Vital Signs Last 24 Hrs  T(C): 36.9 (23 Aug 2022 10:55), Max: 36.9 (23 Aug 2022 10:55)  T(F): 98.5 (23 Aug 2022 10:55), Max: 98.5 (23 Aug 2022 10:55)  HR: 66 (23 Aug 2022 10:55) (66 - 73)  BP: 128/87 (23 Aug 2022 10:55) (125/84 - 128/87)  BP(mean): --  RR: 18 (23 Aug 2022 10:55) (18 - 18)  SpO2: 99% (23 Aug 2022 10:55) (99% - 99%)    Parameters below as of 23 Aug 2022 10:55  Patient On (Oxygen Delivery Method): room air    
Vital Signs Last 24 Hrs  T(C): 36.6 (19 Aug 2022 05:23), Max: 36.8 (19 Aug 2022 01:12)  T(F): 97.9 (19 Aug 2022 05:23), Max: 98.2 (19 Aug 2022 01:12)  HR: 61 (19 Aug 2022 05:23) (61 - 69)  BP: 141/92 (19 Aug 2022 05:23) (113/81 - 141/92)  BP(mean): --  RR: 16 (19 Aug 2022 05:23) (16 - 18)  SpO2: 97% (19 Aug 2022 05:23) (97% - 99%)    Parameters below as of 19 Aug 2022 05:23  Patient On (Oxygen Delivery Method): room air    
Vital Signs Last 24 Hrs  T(C): 36.7 (22 Aug 2022 10:42), Max: 36.8 (21 Aug 2022 21:16)  T(F): 98.1 (22 Aug 2022 10:42), Max: 98.3 (21 Aug 2022 21:16)  HR: 64 (22 Aug 2022 10:42) (64 - 73)  BP: 130/89 (22 Aug 2022 10:42) (109/65 - 130/89)  BP(mean): --  RR: 18 (22 Aug 2022 10:42) (18 - 18)  SpO2: 99% (22 Aug 2022 10:42) (98% - 99%)    Parameters below as of 22 Aug 2022 10:42  Patient On (Oxygen Delivery Method): room air    
Vital Signs Last 24 Hrs  T(C): 36.5 (26 Aug 2022 12:34), Max: 37 (25 Aug 2022 21:25)  T(F): 97.7 (26 Aug 2022 12:34), Max: 98.6 (25 Aug 2022 21:25)  HR: 70 (26 Aug 2022 12:34) (68 - 72)  BP: 122/67 (26 Aug 2022 12:34) (122/67 - 126/81)  BP(mean): --  RR: 17 (26 Aug 2022 12:34) (17 - 18)  SpO2: 100% (26 Aug 2022 12:34) (99% - 100%)    Parameters below as of 26 Aug 2022 12:34  Patient On (Oxygen Delivery Method): room air    
Vital Signs Last 24 Hrs  T(C): 36.6 (18 Aug 2022 06:18), Max: 36.6 (17 Aug 2022 21:18)  T(F): 97.8 (18 Aug 2022 06:18), Max: 97.8 (17 Aug 2022 21:18)  HR: 58 (18 Aug 2022 06:18) (58 - 67)  BP: 123/77 (18 Aug 2022 06:18) (123/77 - 134/70)  BP(mean): --  RR: 18 (18 Aug 2022 06:18) (18 - 18)  SpO2: 99% (18 Aug 2022 06:18) (99% - 99%)    Parameters below as of 18 Aug 2022 06:18  Patient On (Oxygen Delivery Method): room air    
Vital Signs Last 24 Hrs  T(C): 36.3 (14 Aug 2022 10:15), Max: 36.8 (13 Aug 2022 12:20)  T(F): 97.4 (14 Aug 2022 10:15), Max: 98.2 (13 Aug 2022 12:20)  HR: 63 (14 Aug 2022 10:15) (56 - 70)  BP: 113/77 (14 Aug 2022 10:15) (113/77 - 129/82)  BP(mean): --  RR: 18 (14 Aug 2022 10:15) (13 - 18)  SpO2: 98% (14 Aug 2022 10:15) (96% - 99%)    Parameters below as of 14 Aug 2022 10:15  Patient On (Oxygen Delivery Method): room air    
Vital Signs Last 24 Hrs  T(C): 36.2 (16 Aug 2022 05:23), Max: 36.7 (15 Aug 2022 21:29)  T(F): 97.2 (16 Aug 2022 05:23), Max: 98 (15 Aug 2022 21:29)  HR: 60 (16 Aug 2022 05:23) (60 - 88)  BP: 115/68 (16 Aug 2022 05:23) (111/72 - 126/78)  BP(mean): --  RR: 16 (16 Aug 2022 05:23) (16 - 18)  SpO2: 98% (16 Aug 2022 05:23) (98% - 100%)    Parameters below as of 16 Aug 2022 05:23  Patient On (Oxygen Delivery Method): room air

## 2022-08-26 NOTE — BH INPATIENT PSYCHIATRY ASSESSMENT NOTE - CURRENT MEDICATION
MEDICATIONS  (STANDING):  diVALproex  milliGRAM(s) Oral two times a day    MEDICATIONS  (PRN):  diphenhydrAMINE 50 milliGRAM(s) Oral every 4 hours PRN agitation / eps  diphenhydrAMINE Injectable 50 milliGRAM(s) IntraMuscular once PRN agitation / eps  haloperidol     Tablet 5 milliGRAM(s) Oral every 6 hours PRN agitation  haloperidol    Injectable 5 milliGRAM(s) IntraMuscular once PRN agitation  LORazepam     Tablet 2 milliGRAM(s) Oral every 6 hours PRN agitation  LORazepam   Injectable 2 milliGRAM(s) IntraMuscular once PRN agitation

## 2022-08-26 NOTE — BH CONSULTATION LIAISON PROGRESS NOTE - NSBHCONSFOLLOWNEEDS_PSY_ALL_CORE
Needs further psych safety assessment prior to discharge

## 2022-08-26 NOTE — PROGRESS NOTE ADULT - PROBLEM SELECTOR PROBLEM 1
Bipolar disorder

## 2022-08-26 NOTE — BH INPATIENT PSYCHIATRY ASSESSMENT NOTE - NSBHMETABOLIC_PSY_ALL_CORE_FT
BMI: BMI (kg/m2): 29.3 (08-26-22 @ 16:12)  HbA1c: A1C with Estimated Average Glucose Result: 5.5 % (08-13-22 @ 06:26)    Glucose: POCT Blood Glucose.: 116 mg/dL (08-06-22 @ 13:47)    BP: --  Lipid Panel: Date/Time: 08-13-22 @ 06:26  Cholesterol, Serum: 187  Direct LDL: --  HDL Cholesterol, Serum: 38  Total Cholesterol/HDL Ration Measurement: --  Triglycerides, Serum: 222

## 2022-08-26 NOTE — BH INPATIENT PSYCHIATRY ASSESSMENT NOTE - NSBHASSESSSUMMFT_PSY_ALL_CORE
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 Jeovanny Narvaez at [facility], hx of gambling, no substance use disorder, + recent verbally aggressive behavior. + recent multiple legal issues- speeding citations, brought in by family for LOC. 2 recent falls and seen at [facility] ED last on 8/7/22- suture to head and signed out AMA refusing cardiac/telemetry work up. Psych consulted for medication management. As patient was being followed by C/L team, appeared to be manic and not at his baseline per family. Patient transferred to [facility] for decompensation of bipolar disorder.    On interview, patient states he's good. He is calm and cooperative with a linear thought process and perseveration on discharge. Given collateral and labile mood seen by C/L team, patient's presentation is concerning for decompensated bipolar disorder in the setting on medication noncompliance. Will continue medication regimen from C/L team of Abilify 20 mg qd and Depakote 500 mg bid.    Plan:  1.	Legals: admit on 9.27 status.  2.	Safety: routine observation, denies SI/HI/I/P on the unit. Based on risk assessment evaluation, patient IS NOT at acute risk of harm to self or others at this time, DOES NOT require 1:1 CO. PRNs: Ativan 2 mg/Haldol 5 mg/Benadryl 50 mg PO q6h/IM once for agitation.  3.	Psychiatry: Abilify 20 mg qd and Depakote 500 mg bid.  4.	Group, milieu, individual therapy as appropriate.  5.	Medical: no acute medical issues, no significant PMH.  Admission labs WNL.  6.	Dispo: pending clinical improvement.  Patient continues to require inpatient hospitalization for stabilization and safety.     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 Jeovanny Narvaez at [facility], hx of gambling, no substance use disorder, + recent verbally aggressive behavior. + recent multiple legal issues- speeding citations, brought in by family for LOC. 2 recent falls and seen at [facility] ED last on 8/7/22- suture to head and signed out AMA refusing cardiac/telemetry work up. Psych consulted for medication management. As patient was being followed by C/L team, appeared to be manic and not at his baseline per family. Patient transferred to [facility] for decompensation of bipolar disorder.    On interview, patient has no complaints, does not feel he has a mental illness and does not feel he requires hospitalization. He is calm, superficial, has linear thought process and perseveration on discharge. Given collateral and labile mood seen by C/L team, patient's presentation is concerning for decompensated bipolar disorder in the setting on medication noncompliance. Will continue medication regimen from C/L team of Abilify 20 mg qd and Depakote 500 mg bid.    Plan:  1.	Legals: admit on 9.27 status.  2.	Safety: routine observation, denies SI/HI/I/P on the unit. Based on risk assessment evaluation, patient IS NOT at acute risk of harm to self or others at this time, DOES NOT require 1:1 CO. PRNs: Ativan 2 mg/Haldol 5 mg/Benadryl 50 mg PO q6h/IM once for agitation.  3.	Psychiatry: Abilify 20 mg qd and Depakote 500 mg bid.  4.	Group, milieu, individual therapy as appropriate.  5.	Medical: no acute medical issues, no significant PMH.  Admission labs WNL.  6.	Dispo: pending clinical improvement.  Patient continues to require inpatient hospitalization for stabilization and safety.

## 2022-08-26 NOTE — BH CONSULTATION LIAISON PROGRESS NOTE - NSBHCHARTREVIEWLAB_PSY_A_CORE FT
CBC Full  -  ( 18 Aug 2022 06:55 )  WBC Count : 9.23 K/uL  RBC Count : 4.38 M/uL  Hemoglobin : 14.3 g/dL  Hematocrit : 43.3 %  Platelet Count - Automated : 228 K/uL  Mean Cell Volume : 98.9 fL  Mean Cell Hemoglobin : 32.6 pg  Mean Cell Hemoglobin Concentration : 33.0 gm/dL  Auto Neutrophil # : 3.59 K/uL  Auto Lymphocyte # : 4.34 K/uL  Auto Monocyte # : 0.96 K/uL  Auto Eosinophil # : 0.24 K/uL  Auto Basophil # : 0.06 K/uL  Auto Neutrophil % : 38.9 %  Auto Lymphocyte % : 47.0 %  Auto Monocyte % : 10.4 %  Auto Eosinophil % : 2.6 %  Auto Basophil % : 0.7 %  08-18    140  |  104  |  22  ----------------------------<  90  4.2   |  24  |  0.95    Ca    9.4      18 Aug 2022 06:55  Phos  4.0     08-18  Mg     2.10     08-18    
CBC Full  -  ( 13 Aug 2022 06:26 )  WBC Count : 7.03 K/uL  RBC Count : 4.22 M/uL  Hemoglobin : 13.8 g/dL  Hematocrit : 42.8 %  Platelet Count - Automated : 187 K/uL  Mean Cell Volume : 101.4 fL  Mean Cell Hemoglobin : 32.7 pg  Mean Cell Hemoglobin Concentration : 32.2 gm/dL  Auto Neutrophil # : x  Auto Lymphocyte # : x  Auto Monocyte # : x  Auto Eosinophil # : x  Auto Basophil # : x  Auto Neutrophil % : x  Auto Lymphocyte % : x  Auto Monocyte % : x  Auto Eosinophil % : x  Auto Basophil % : x  08-13    137  |  103  |  13  ----------------------------<  96  4.1   |  22  |  0.77    Ca    8.6      13 Aug 2022 06:26  Phos  3.5     08-13  Mg     2.10     08-13    TPro  6.4  /  Alb  3.8  /  TBili  0.2  /  DBili  <0.2  /  AST  26  /  ALT  31  /  AlkPhos  60  08-13  
CBC Full  -  ( 18 Aug 2022 06:55 )  WBC Count : 9.23 K/uL  RBC Count : 4.38 M/uL  Hemoglobin : 14.3 g/dL  Hematocrit : 43.3 %  Platelet Count - Automated : 228 K/uL  Mean Cell Volume : 98.9 fL  Mean Cell Hemoglobin : 32.6 pg  Mean Cell Hemoglobin Concentration : 33.0 gm/dL  Auto Neutrophil # : 3.59 K/uL  Auto Lymphocyte # : 4.34 K/uL  Auto Monocyte # : 0.96 K/uL  Auto Eosinophil # : 0.24 K/uL  Auto Basophil # : 0.06 K/uL  Auto Neutrophil % : 38.9 %  Auto Lymphocyte % : 47.0 %  Auto Monocyte % : 10.4 %  Auto Eosinophil % : 2.6 %  Auto Basophil % : 0.7 %  08-18    140  |  104  |  22  ----------------------------<  90  4.2   |  24  |  0.95    Ca    9.4      18 Aug 2022 06:55  Phos  4.0     08-18  Mg     2.10     08-18    
CBC Full  -  ( 13 Aug 2022 06:26 )  WBC Count : 7.03 K/uL  RBC Count : 4.22 M/uL  Hemoglobin : 13.8 g/dL  Hematocrit : 42.8 %  Platelet Count - Automated : 187 K/uL  Mean Cell Volume : 101.4 fL  Mean Cell Hemoglobin : 32.7 pg  Mean Cell Hemoglobin Concentration : 32.2 gm/dL  Auto Neutrophil # : x  Auto Lymphocyte # : x  Auto Monocyte # : x  Auto Eosinophil # : x  Auto Basophil # : x  Auto Neutrophil % : x  Auto Lymphocyte % : x  Auto Monocyte % : x  Auto Eosinophil % : x  Auto Basophil % : x  08-13    137  |  103  |  13  ----------------------------<  96  4.1   |  22  |  0.77    Ca    8.6      13 Aug 2022 06:26  Phos  3.5     08-13  Mg     2.10     08-13    TPro  6.4  /  Alb  3.8  /  TBili  0.2  /  DBili  <0.2  /  AST  26  /  ALT  31  /  AlkPhos  60  08-13  
CBC Full  -  ( 18 Aug 2022 06:55 )  WBC Count : 9.23 K/uL  RBC Count : 4.38 M/uL  Hemoglobin : 14.3 g/dL  Hematocrit : 43.3 %  Platelet Count - Automated : 228 K/uL  Mean Cell Volume : 98.9 fL  Mean Cell Hemoglobin : 32.6 pg  Mean Cell Hemoglobin Concentration : 33.0 gm/dL  Auto Neutrophil # : 3.59 K/uL  Auto Lymphocyte # : 4.34 K/uL  Auto Monocyte # : 0.96 K/uL  Auto Eosinophil # : 0.24 K/uL  Auto Basophil # : 0.06 K/uL  Auto Neutrophil % : 38.9 %  Auto Lymphocyte % : 47.0 %  Auto Monocyte % : 10.4 %  Auto Eosinophil % : 2.6 %  Auto Basophil % : 0.7 %  08-18    140  |  104  |  22  ----------------------------<  90  4.2   |  24  |  0.95    Ca    9.4      18 Aug 2022 06:55  Phos  4.0     08-18  Mg     2.10     08-18

## 2022-08-26 NOTE — BH CONSULTATION LIAISON PROGRESS NOTE - CURRENT MEDICATION
MEDICATIONS  (STANDING):  ARIPiprazole 15 milliGRAM(s) Oral daily  enoxaparin Injectable 40 milliGRAM(s) SubCutaneous every 24 hours  folic acid 1 milliGRAM(s) Oral daily  multivitamin 1 Tablet(s) Oral daily    MEDICATIONS  (PRN):  haloperidol     Tablet 5 milliGRAM(s) Oral every 6 hours PRN anxiety/ agitation  haloperidol    Injectable 5 milliGRAM(s) IntraMuscular every 6 hours PRN Agitation  melatonin 3 milliGRAM(s) Oral at bedtime PRN Insomnia  
MEDICATIONS  (STANDING):  ARIPiprazole 17.5 milliGRAM(s) Oral daily  enoxaparin Injectable 40 milliGRAM(s) SubCutaneous every 24 hours  folic acid 1 milliGRAM(s) Oral daily  multivitamin 1 Tablet(s) Oral daily    MEDICATIONS  (PRN):  haloperidol     Tablet 5 milliGRAM(s) Oral every 6 hours PRN anxiety/ agitation  haloperidol    Injectable 5 milliGRAM(s) IntraMuscular every 6 hours PRN Agitation  melatonin 3 milliGRAM(s) Oral at bedtime PRN Insomnia  
MEDICATIONS  (STANDING):  ARIPiprazole 5 milliGRAM(s) Oral daily  enoxaparin Injectable 40 milliGRAM(s) SubCutaneous every 24 hours  folic acid 1 milliGRAM(s) Oral daily  multivitamin 1 Tablet(s) Oral daily  thiamine 100 milliGRAM(s) Oral daily    MEDICATIONS  (PRN):  haloperidol     Tablet 5 milliGRAM(s) Oral every 6 hours PRN anxiety/ agitation  LORazepam     Tablet 2 milliGRAM(s) Oral every 2 hours PRN Symptom-triggered 2 point increase in CIWA-Ar  LORazepam   Injectable 2 milliGRAM(s) IV Push every 2 hours PRN Symptom-triggered: each CIWA -Ar score 8 or GREATER  
MEDICATIONS  (STANDING):  ARIPiprazole 10 milliGRAM(s) Oral daily  enoxaparin Injectable 40 milliGRAM(s) SubCutaneous every 24 hours  folic acid 1 milliGRAM(s) Oral daily  multivitamin 1 Tablet(s) Oral daily    MEDICATIONS  (PRN):  haloperidol     Tablet 5 milliGRAM(s) Oral every 6 hours PRN anxiety/ agitation  haloperidol    Injectable 5 milliGRAM(s) IntraMuscular every 6 hours PRN Agitation  melatonin 3 milliGRAM(s) Oral at bedtime PRN Insomnia  
MEDICATIONS  (STANDING):  ARIPiprazole 10 milliGRAM(s) Oral daily  enoxaparin Injectable 40 milliGRAM(s) SubCutaneous every 24 hours  folic acid 1 milliGRAM(s) Oral daily  multivitamin 1 Tablet(s) Oral daily    MEDICATIONS  (PRN):  haloperidol     Tablet 5 milliGRAM(s) Oral every 6 hours PRN anxiety/ agitation  haloperidol    Injectable 5 milliGRAM(s) IntraMuscular every 6 hours PRN Agitation  
MEDICATIONS  (STANDING):  ARIPiprazole 20 milliGRAM(s) Oral daily  clotrimazole 1% Cream 1 Application(s) Topical two times a day  diVALproex  milliGRAM(s) Oral at bedtime  enoxaparin Injectable 40 milliGRAM(s) SubCutaneous every 24 hours  folic acid 1 milliGRAM(s) Oral daily  multivitamin 1 Tablet(s) Oral daily    MEDICATIONS  (PRN):  haloperidol     Tablet 5 milliGRAM(s) Oral every 6 hours PRN anxiety/ agitation  haloperidol    Injectable 5 milliGRAM(s) IntraMuscular every 6 hours PRN Agitation  melatonin 3 milliGRAM(s) Oral at bedtime PRN Insomnia  
MEDICATIONS  (STANDING):  ARIPiprazole 5 milliGRAM(s) Oral daily  enoxaparin Injectable 40 milliGRAM(s) SubCutaneous every 24 hours  folic acid 1 milliGRAM(s) Oral daily  multivitamin 1 Tablet(s) Oral daily  thiamine 100 milliGRAM(s) Oral daily    MEDICATIONS  (PRN):  haloperidol     Tablet 5 milliGRAM(s) Oral every 6 hours PRN anxiety/ agitation  LORazepam     Tablet 2 milliGRAM(s) Oral every 2 hours PRN Symptom-triggered 2 point increase in CIWA-Ar  LORazepam   Injectable 2 milliGRAM(s) IV Push every 2 hours PRN Symptom-triggered: each CIWA -Ar score 8 or GREATER  
MEDICATIONS  (STANDING):  ARIPiprazole 10 milliGRAM(s) Oral daily  enoxaparin Injectable 40 milliGRAM(s) SubCutaneous every 24 hours  folic acid 1 milliGRAM(s) Oral daily  multivitamin 1 Tablet(s) Oral daily    MEDICATIONS  (PRN):  haloperidol     Tablet 5 milliGRAM(s) Oral every 6 hours PRN anxiety/ agitation  LORazepam     Tablet 2 milliGRAM(s) Oral every 2 hours PRN Symptom-triggered 2 point increase in CIWA-Ar  LORazepam   Injectable 2 milliGRAM(s) IV Push every 2 hours PRN Symptom-triggered: each CIWA -Ar score 8 or GREATER  
MEDICATIONS  (STANDING):  ARIPiprazole 10 milliGRAM(s) Oral daily  enoxaparin Injectable 40 milliGRAM(s) SubCutaneous every 24 hours  folic acid 1 milliGRAM(s) Oral daily  multivitamin 1 Tablet(s) Oral daily    MEDICATIONS  (PRN):  haloperidol     Tablet 5 milliGRAM(s) Oral every 6 hours PRN anxiety/ agitation  haloperidol    Injectable 5 milliGRAM(s) IntraMuscular every 6 hours PRN Agitation  
MEDICATIONS  (STANDING):  ARIPiprazole 5 milliGRAM(s) Oral daily  enoxaparin Injectable 40 milliGRAM(s) SubCutaneous every 24 hours  folic acid 1 milliGRAM(s) Oral daily  multivitamin 1 Tablet(s) Oral daily    MEDICATIONS  (PRN):  haloperidol     Tablet 5 milliGRAM(s) Oral every 6 hours PRN anxiety/ agitation  LORazepam     Tablet 2 milliGRAM(s) Oral every 2 hours PRN Symptom-triggered 2 point increase in CIWA-Ar  LORazepam   Injectable 2 milliGRAM(s) IV Push every 2 hours PRN Symptom-triggered: each CIWA -Ar score 8 or GREATER

## 2022-08-26 NOTE — PROGRESS NOTE ADULT - PROBLEM SELECTOR PROBLEM 4
Swelling of left lower extremity

## 2022-08-26 NOTE — BH INPATIENT PSYCHIATRY ASSESSMENT NOTE - NSBHATTESTCOMMENTATTENDFT_PSY_A_CORE
53 yo male,  from wife, recently unemployed, psychiatric diagnosis of Bipolar disorder, prior hospitalizations (last 2014), two prior suicide attempts, hx of aggression, currently in treatment at [facility], initially medically admitted for recurring syncope, followed by  psychiatry for manic symptoms. Patient now medically stabilized and transferred to [facility] for further treatment of Maria D. Course while admitted medically notable for concerns related to maria d, irritability and being verbally aggressive, and with attempts to leave AMA. On interview is guarded and superficial, denies all symptoms of bipolar disorder, is preoccupied with discharge, minimizes / normalizes his frequent falls. Pt amenable to continuing medications. Will continue Abilify 20mg daily + Depakote 500mg BID.  No CO indicated at this time. 9.27

## 2022-08-26 NOTE — BH CONSULTATION LIAISON PROGRESS NOTE - NSBHATTESTBILLINGAW_PSY_A_CORE
Billing in another system
[id number] Inpatient care - moderate complexity - 25 minutes
[id number]-Ppodbnzazy Inpatient care - moderate complexity - 25 minutes
[id number]-Ppaklcszfg Inpatient care - high complexity - 35 minutes
[id number]-Wkhtcwfyaj Inpatient care - moderate complexity - 25 minutes
[id number]-Tskbgzynja Inpatient care - moderate complexity - 25 minutes
[id number]-Zkxsghizvv Inpatient care - high complexity - 35 minutes
[id number]-Dpbaxlzwgy Inpatient care - high complexity - 35 minutes
Non-billable
[id number]-Kwlgcqmqkd Inpatient care - high complexity - 35 minutes

## 2022-08-26 NOTE — BH CONSULTATION LIAISON PROGRESS NOTE - NSBHMSESPABN_PSY_A_CORE
Soft volume/Slowed rate/Decreased productivity
Loud volume/Increased productivity
Loud volume/Increased productivity
Pressured rate/Increased productivity
Loud volume/Increased productivity
Loud volume/Increased productivity

## 2022-08-26 NOTE — BH CONSULTATION LIAISON PROGRESS NOTE - NSBHMSETHTPROC_PSY_A_CORE
Linear
Other
Disorganized/Thought blocking/Illogical/Impaired reasoning
Disorganized/Illogical/Impaired reasoning
Disorganized/Illogical/Impaired reasoning
Linear
Disorganized/Illogical/Impaired reasoning
Disorganized/Illogical/Impaired reasoning

## 2022-08-26 NOTE — BH INPATIENT PSYCHIATRY ASSESSMENT NOTE - NSBHCHARTREVIEWVS_PSY_A_CORE FT
Vital Signs Last 24 Hrs  T(C): 36.6 (08-26-22 @ 16:12), Max: 37 (08-25-22 @ 21:25)  T(F): 97.9 (08-26-22 @ 16:12), Max: 98.6 (08-25-22 @ 21:25)  HR: 70 (08-26-22 @ 12:34) (68 - 72)  BP: 122/67 (08-26-22 @ 12:34) (122/67 - 126/81)  BP(mean): --  RR: 18 (08-26-22 @ 16:12) (17 - 18)  SpO2: 100% (08-26-22 @ 12:34) (99% - 100%)    Orthostatic VS  08-26-22 @ 16:12  Lying BP: --/-- HR: --  Sitting BP: 131/68 HR: 62  Standing BP: 125/82 HR: 71  Site: --  Mode: --

## 2022-08-26 NOTE — BH CONSULTATION LIAISON PROGRESS NOTE - NSBHMSEAFFRANGE_PSY_A_CORE
Constricted
Labile/Constricted
Labile/Constricted
Constricted
Labile/Constricted
Labile/Constricted
Constricted
Labile/Constricted
Labile/Constricted
Constricted

## 2022-08-26 NOTE — BH CONSULTATION LIAISON PROGRESS NOTE - NSBHCHARTREVIEWINVESTIGATE_PSY_A_CORE FT
EKG nsh=655 on 8/14
EKG hpp=969 on 8/13
EKG pkh=445 on 8/14
EKG jjn=694 on 8/14
EKG izh=905 on 8/14
EKG ivv=732 on 8/13
EKG dif=955 on 8/13
EKG vfx=210 on 8/13
EKG wkd=982 on 8/14
EKG tvn=984 on 8/13

## 2022-08-26 NOTE — PROGRESS NOTE ADULT - PROBLEM SELECTOR PLAN 4
Lt LE with trace edema. Resolved.   NT to palpation.  US Duplex Venous Lower Ext Complete, Bilateral (08.14.22 @ 13:17) IMPRESSION: No evidence of deep venous thrombosis in either lower extremity.
Lt LE with trace edema.   NT to palpation.  US Duplex Venous Lower Ext Complete, Bilateral (08.14.22 @ 13:17) IMPRESSION: No evidence of deep venous thrombosis in either lower extremity.
Lt LE with trace edema.   NT to palpation.  - B/L LE duplex to assess for DVT.
Lt LE with trace edema. Resolved.   NT to palpation.  US Duplex Venous Lower Ext Complete, Bilateral (08.14.22 @ 13:17) IMPRESSION: No evidence of deep venous thrombosis in either lower extremity.
Lt LE with trace edema.   NT to palpation.  US Duplex Venous Lower Ext Complete, Bilateral (08.14.22 @ 13:17) IMPRESSION: No evidence of deep venous thrombosis in either lower extremity.
Lt LE with trace edema. Resolved.   NT to palpation.  US Duplex Venous Lower Ext Complete, Bilateral (08.14.22 @ 13:17) IMPRESSION: No evidence of deep venous thrombosis in either lower extremity.
Lt LE with trace edema.   NT to palpation.  US Duplex Venous Lower Ext Complete, Bilateral (08.14.22 @ 13:17) IMPRESSION: No evidence of deep venous thrombosis in either lower extremity.
Lt LE with trace edema.   NT to palpation.  US Duplex Venous Lower Ext Complete, Bilateral (08.14.22 @ 13:17) IMPRESSION: No evidence of deep venous thrombosis in either lower extremity.
Lt LE with trace edema. Resolved.   NT to palpation.  US Duplex Venous Lower Ext Complete, Bilateral (08.14.22 @ 13:17) IMPRESSION: No evidence of deep venous thrombosis in either lower extremity.
Lt LE with trace edema.   NT to palpation.  US Duplex Venous Lower Ext Complete, Bilateral (08.14.22 @ 13:17) IMPRESSION: No evidence of deep venous thrombosis in either lower extremity.
Lt LE with trace edema.   NT to palpation.  US Duplex Venous Lower Ext Complete, Bilateral (08.14.22 @ 13:17) IMPRESSION: No evidence of deep venous thrombosis in either lower extremity.
Lt LE with trace edema. Resolved.   NT to palpation.  US Duplex Venous Lower Ext Complete, Bilateral (08.14.22 @ 13:17) IMPRESSION: No evidence of deep venous thrombosis in either lower extremity.
Lt LE with trace edema.   NT to palpation.  US Duplex Venous Lower Ext Complete, Bilateral (08.14.22 @ 13:17) IMPRESSION: No evidence of deep venous thrombosis in either lower extremity.
Lt LE with trace edema.   NT to palpation.  US Duplex Venous Lower Ext Complete, Bilateral (08.14.22 @ 13:17) IMPRESSION: No evidence of deep venous thrombosis in either lower extremity.
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
## 2022-08-26 NOTE — BH CONSULTATION LIAISON PROGRESS NOTE - NSBHMSETHTCONTENT_PSY_A_CORE
Unremarkable
Preoccupations/Other
Unremarkable/Other
Unremarkable
Preoccupations/Other
Unremarkable
Preoccupations/Other
Preoccupations/Other
Unremarkable
Preoccupations/Other

## 2022-08-26 NOTE — DISCHARGE NOTE NURSING/CASE MANAGEMENT/SOCIAL WORK - NSDCPEFALRISK_GEN_ALL_CORE
For information on Fall & Injury Prevention, visit: [email or web address] OR  [email or web address] OR  https://www.cdc.gov/steadi/patient.html

## 2022-08-26 NOTE — BH PATIENT PROFILE - HOW PATIENT ADDRESSED, PROFILE
Problem: Self Care Deficits Care Plan (Adult) Goal: *Acute Goals and Plan of Care (Insert Text) Description:  
FUNCTIONAL STATUS PRIOR TO ADMISSION: The patient was functional at the wheelchair level and required assist for most ADLs. Patient is a poor historian therefore background information obtained from the chart. HOME SUPPORT: The patient lived at an adult group home. Occupational Therapy Goals Initiated 5/19/2020 1. Patient will perform grooming with supervision/set-up within 7 day(s). 2.  Patient will perform upper body dressing and bathing with supervision/set-up within 7 day(s). 3.  Patient will perform lower body dressing and bathing with moderate assistance  within 7 day(s). 4.  Patient will perform toilet transfers to [facility] with moderate assistance x1 within 7 day(s). 5.  Patient will perform all aspects of toileting with moderate assistance  within 7 day(s). 6.  Patient will participate in upper extremity therapeutic exercise/activities with supervision/set-up for 10 minutes within 7 day(s). Outcome: Progressing Towards Goal 
 OCCUPATIONAL THERAPY TREATMENT Patient: Magaly Aldana (37 y.o. male) Date: 5/20/2020 Diagnosis: Acute on chronic systolic CHF (congestive heart failure) ([facility] [address] [I50.23] [facility] Precautions:  fall Chart, occupational therapy assessment, plan of care, and goals were reviewed. ASSESSMENT Patient continues with skilled OT services and agreeable to OT tx. Patient was able to come to sitting EOB mod A x2 needed. Seated activity focused on midline orientation and improving independence with oral care. Patient with increased coughing, nearly constantly with movement. Educated patient on bracing techniques to facilitate a more productive cough and Y[place]auer use for suctioning as he was unable to manage secretions without use.   Patient was able to transfer to the chair in preparation for ADL transfers. He required significant x2 person assist for transfers with significant R lateral lean. Patient with SOB with all activity however SpO2 remained >90% on 2L O2. Education provided regarding pursed lip breathing techniques and needs max, direct verbal cuing for carryover. Patient would benefit from continued skilled OT to progress towards goals and improve overall independence. Current Level of Function Impacting Discharge (ADLs): Patient required mod A x2 for bed mobility, max A x2 for scooting to EOB and further back in the chair. Patient required mod A x2 for sit to stand transfers and max A x2 for pivot to the chair using rolling walker. PLAN : 
Patient continues to benefit from skilled intervention to address the above impairments. Continue treatment per established plan of care. to address goals. Recommend with staff: 
Recommend patient be OOB to chair as frequently as tolerated; Goal of 3x/day for all meals for 30-60 minutes at a time. For toileting needs, recommend BSC transfers with RW, gait belt, and staff assist x2 person. Encourage patient involvement in personal care as able. Encourage exercises frequently throughout the day. Recommend next OT session: ADL transfers, ADLs, energy conservation Recommendation for discharge: (in order for the patient to meet his/her long term goals) Return to the group home vs. SNF pending progress and level of assist the facility can provide This discharge recommendation: 
Has been made in collaboration with the attending provider and/or case management IF patient discharges home will need the following DME: none SUBJECTIVE:  
Patient stated I can't get it out.  Patient stated this when coughing. OBJECTIVE DATA SUMMARY:  
Cognitive/Behavioral Status: 
Neurologic State: Alert Orientation Level: Oriented to person;Disoriented to time;Disoriented to situation;Disoriented to place Cognition: Decreased command following;Decreased attention/concentration;Poor safety awareness;Memory loss Perception: Cues to maintain midline in sitting;Cues to maintain midline in standing Perseveration: No perseveration noted Safety/Judgement: Decreased awareness of environment Functional Mobility and Transfers for ADLs: 
Bed Mobility: 
Rolling: Moderate assistance;Assist x2; Additional time Supine to Sit: Moderate assistance;Assist x2; Additional time Sit to Supine: (pt remained up at end of tx) Scooting: Maximum assistance;Assist x2 Transfers: 
Sit to Stand: Moderate assistance;Assist x2 Bed to Chair: Maximum assistance;Assist x2(significant lean to the R with fatigue) Balance: 
Sitting: Impaired Sitting - Static: Fair (occasional) Sitting - Dynamic: Poor (constant support) Standing: Impaired; With support;Pull to stand Standing - Static: Poor Standing - Dynamic : Poor ADL Intervention: 
Oral care:  
Patient educated on [address] use. He initially required total A but with education and repetition, patient required supervision to min A. Cognitive Retraining Safety/Judgement: Decreased awareness of environment Therapeutic Exercises:  
Patient educated on the benefits of exercise and encouraged to complete frequently throughout the day as tolerated. Instruction provided for the following exercises: shoulder shrugs,elbow flexion/extension, wrist flexion/extension, finger flexion/extension. Patient tolerated 1 sets x 3 reps to demonstrated understanding and encouraged to complete as often as tolerated throughout the day. Activity Tolerance:  
Fair and requires rest breaks Please refer to the flowsheet for vital signs taken during this treatment. After treatment patient left in no apparent distress:  
Sitting in chair, Call bell within reach, and Bed / chair alarm activated COMMUNICATION/COLLABORATION:  
The patients plan of care was discussed with: Physical therapist, Registered nurse, and patient . Angeline Stephens, OTR/L Time Calculation: 33 mins Brandon
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
## 2022-08-26 NOTE — PROGRESS NOTE ADULT - SUBJECTIVE AND OBJECTIVE BOX
Hospitalist Progress Note  Elsy Zayas MD Pager # [phone]    OVERNIGHT EVENTS: MAX    SUBJECTIVE / INTERVAL HPI: Patient seen and examined at bedside.     VITAL SIGNS:  Vital Signs Last 24 Hrs  T(C): 36.5 (26 Aug 2022 12:34), Max: 37 (25 Aug 2022 21:25)  T(F): 97.7 (26 Aug 2022 12:34), Max: 98.6 (25 Aug 2022 21:25)  HR: 70 (26 Aug 2022 12:34) (68 - 75)  BP: 122/67 (26 Aug 2022 12:34) (116/86 - 126/81)  BP(mean): --  RR: 17 (26 Aug 2022 12:34) (17 - 18)  SpO2: 100% (26 Aug 2022 12:34) (99% - 100%)    Parameters below as of 26 Aug 2022 12:34  Patient On (Oxygen Delivery Method): room air        PHYSICAL EXAM:    General: WDWN  HEENT: NC/AT; PERRL, anicteric sclera; MMM  Neck: supple  Cardiovascular: +S1/S2; RRR  Respiratory: CTA B/L; no W/R/R  Gastrointestinal: soft, NT/ND; +BSx4  Extremities: WWP; no edema, clubbing or cyanosis  Vascular: 2+ radial, DP/PT pulses B/L  Neurological: AAOx3; no focal deficits    MEDICATIONS:  MEDICATIONS  (STANDING):  ARIPiprazole 20 milliGRAM(s) Oral daily  clotrimazole 1% Cream 1 Application(s) Topical two times a day  diVALproex  milliGRAM(s) Oral at bedtime  enoxaparin Injectable 40 milliGRAM(s) SubCutaneous every 24 hours  folic acid 1 milliGRAM(s) Oral daily  multivitamin 1 Tablet(s) Oral daily    MEDICATIONS  (PRN):  haloperidol     Tablet 5 milliGRAM(s) Oral every 6 hours PRN anxiety/ agitation  haloperidol    Injectable 5 milliGRAM(s) IntraMuscular every 6 hours PRN Agitation  melatonin 3 milliGRAM(s) Oral at bedtime PRN Insomnia      ALLERGIES:  Allergies    penicillin (Hives)  penicillins (Rash)    Intolerances        LABS:              CAPILLARY BLOOD GLUCOSE          RADIOLOGY & ADDITIONAL TESTS: Reviewed.    ASSESSMENT:    PLAN:  Hospitalist Progress Note  Elsy Zayas MD Pager # [phone]    OVERNIGHT EVENTS: MAX    SUBJECTIVE / INTERVAL HPI: Patient seen and examined at bedside. Pt reports feeling well and has no complaints.     VITAL SIGNS:  Vital Signs Last 24 Hrs  T(C): 36.5 (26 Aug 2022 12:34), Max: 37 (25 Aug 2022 21:25)  T(F): 97.7 (26 Aug 2022 12:34), Max: 98.6 (25 Aug 2022 21:25)  HR: 70 (26 Aug 2022 12:34) (68 - 75)  BP: 122/67 (26 Aug 2022 12:34) (116/86 - 126/81)  BP(mean): --  RR: 17 (26 Aug 2022 12:34) (17 - 18)  SpO2: 100% (26 Aug 2022 12:34) (99% - 100%)    Parameters below as of 26 Aug 2022 12:34  Patient On (Oxygen Delivery Method): room air        PHYSICAL EXAM:    General: WDWN male resting in bed   HEENT: NC/AT; PERRL, anicteric sclera; MMM  Neck: supple  Cardiovascular: +S1/S2; RRR  Respiratory: CTA B/L; no W/R/R  Gastrointestinal: soft, NT/ND; +BSx4  Extremities: WWP; no edema, clubbing or cyanosis  Vascular: 2+ radial, DP/PT pulses B/L  Neurological: AAOx3; no focal deficits    MEDICATIONS:  MEDICATIONS  (STANDING):  ARIPiprazole 20 milliGRAM(s) Oral daily  clotrimazole 1% Cream 1 Application(s) Topical two times a day  diVALproex  milliGRAM(s) Oral at bedtime  enoxaparin Injectable 40 milliGRAM(s) SubCutaneous every 24 hours  folic acid 1 milliGRAM(s) Oral daily  multivitamin 1 Tablet(s) Oral daily    MEDICATIONS  (PRN):  haloperidol     Tablet 5 milliGRAM(s) Oral every 6 hours PRN anxiety/ agitation  haloperidol    Injectable 5 milliGRAM(s) IntraMuscular every 6 hours PRN Agitation  melatonin 3 milliGRAM(s) Oral at bedtime PRN Insomnia      ALLERGIES:  Allergies    penicillin (Hives)  penicillins (Rash)    Intolerances        LABS:              CAPILLARY BLOOD GLUCOSE          RADIOLOGY & ADDITIONAL TESTS: Reviewed.    ASSESSMENT:    PLAN:

## 2022-08-26 NOTE — BH INPATIENT PSYCHIATRY ASSESSMENT NOTE - MSE UNSTRUCTURED FT
On exam today, patient is casually dressed. appears stated age with stitches near left eye.  Patient is calm, cooperative with good eye contact.  Speech is of normal volume, rate, and tone. redirectable.  Patient exhibits no psychomotor slowing or agitation.  Mood: "good."  Affect: neutral, constricted, congruent to mood, appropriate to content.  Thought Process: linear. overinclusive.    Thought Content: perseverative on discharge and feeling well. denies SI/HI/I/P.  Perception: denies AVH.  Insight is limited.  Judgement is fair at time of interview.  Gait is intact.  Impulse control intact at this time.

## 2022-08-26 NOTE — BH CONSULTATION LIAISON PROGRESS NOTE - NSBHMSEAFFCONG_PSY_A_CORE
Congruent
Non-congruent
Congruent

## 2022-08-26 NOTE — DISCHARGE NOTE NURSING/CASE MANAGEMENT/SOCIAL WORK - PATIENT PORTAL LINK FT
You can access the FollowMyHealth Patient Portal offered by [facility] by registering at the following website: [email or web address]. By joining [employer]’s FollowMyHealth portal, you will also be able to view your health information using other applications (apps) compatible with our system.

## 2022-08-26 NOTE — BH CONSULTATION LIAISON PROGRESS NOTE - NSBHASSESSMENTFT_PSY_ALL_CORE
50 yo male,  from wife, from [place], recently lost job, with PMHx 2 recent falls, PPHx  Bipolar Disorder, hx of multiple inpatient psychiatric hospitalizations- last in 2014, hx of two SAs (overdose, and aborted attempted related to bridge), hx of gambling, no substance use disorder, + recent verbally aggressive behavior. + recent multiple legal issues- speeding citations. Currently treated at [facility] since 2014. Patient recently seen at [facility] ED last on 8/7/22- suture to head and signed out AMA refusing cardiac/telemetry work up. BIBA refereed by psychiatrist for manic behavior.   Patient presents to the ED in the context of manic behavior. Patient has been non complaint with medication and although he has a history of a gambling problem- he has not been sleeping, gambling issues have worsened, he is acting erratically and shows poor insight into both psychiatric and medication issues - leaving AMA refusing medical work ups for syncopal episodes. Patient shows poor insight and judgement and is impulsive and unpredictable. Pt is at elevated risk for inadvertent injury to self/others due to exacerbation and intensity of symptoms and will therefore require admission to inpatient psychiatry at this time.    8/13: patient seen as follow up today, a&o, calm, cooperative, no behavioral issues reported by staff, patient denies suicidal/homicidal ideation, denies auditory/visual hallucinations:  8/14: lethargic but easily arousal, no acute events, denies SI/HI   8/15: Patient awake and alert. CIWAs 0-2. No PRNs for agitation. Patient calm, cooperative. No current overt signs of sowmya, no SI/HI, AVH.  8/16: Patient oriented, well engaged, no current overt signs of sowmya, no SI/HI, denies AVH, pt. with limited insight into his psychiatric condition. Amenable to continue Abilify.   8/17: Required PRN yesterday, seems more irritable today, denies SI and HI, remains with limited insight into his psychiatric condition. Amenable to continue Abilify.  8/18: a&o, 1:1 CO maintained, no prn's,  mood varies, focused on going home, thought blocking-does not know why he is in the hospital, medication compliant, denies si/hi/ah/vh,    8/19: Alert, irritable/labile mood, denies SI and HI, paranoid, poor insight/judgment.    8/22: plan unchanged, increase abilify as below tomorrow, plan for [facility] admission 2PC in chart, collateral from Dr. Flores reviewed high concern for lability/decompensation if discharged   8/23: patient unchanged, no SI/HI but still labile, irritable, on phone for hours a day has been threatening/irritable toward family not yet back to baseline per family, would begin dual mood stabilizers for bipolar sowmya as below   8/26: unchanged, still on phone all day, tolerating meds as below, [facility] transfer today     PLAN:  - does not need CO 1:1 on 7S   -INCREASE Abilify to 20mg daily   -BEGIN Depakote 500mg BID PO  -f/u BMP+LFT's daily   -c/w Haldol 5mg q6h PO/Im/IV q6h prn for agitation  -Monitor EKG qtc interval and hold above antipsychotics if qtc >500  -CL to follow  -Patient CANNOT leave AMA  -Case d/w Dr. Schwarz  -Collateral obtained from brother Souht [phone] (pt. provided verbal consent)  -Care coordinated with patient's ex-wife Lesli [phone]   -Care coordinated with Dr. Flores ([facility] outpatient psychiatrist)  Dispo: Given acute psychiatric decompensation, concerning collateral, pt. will need an inpatient psychiatric admission for stability/medication management. 2PC in the chart

## 2022-08-26 NOTE — BH INPATIENT PSYCHIATRY ASSESSMENT NOTE - NSDCCRITERIA_PSY_ALL_CORE
When pt is no longer an acute or imminent risk of harm to self or others, and is able to care for self safely, pt may then be discharged.

## 2022-08-26 NOTE — BH CONSULTATION LIAISON PROGRESS NOTE - NSBHMSESPEECH_PSY_A_CORE
Abnormal as indicated, otherwise normal...
Abnormal as indicated, otherwise normal...
Normal volume, rate, productivity, spontaneity and articulation
Abnormal as indicated, otherwise normal...
Normal volume, rate, productivity, spontaneity and articulation
Abnormal as indicated, otherwise normal...

## 2022-08-26 NOTE — PROGRESS NOTE ADULT - PROBLEM SELECTOR PROBLEM 3
Alcohol intoxication, uncomplicated

## 2022-08-26 NOTE — BH CONSULTATION LIAISON PROGRESS NOTE - NSBHFUPINTERVALHXFT_PSY_A_CORE
patient still pacing, on phone    on exam patient irritable, does not want to go to [facility], poor insight into need for continued psychiatric care and events surrounding his hospitalization, spoke to patient later with  though exam unchanged, still wishes to go home and still irritable/labile     updated family who strongly feel patient must be psychiatrically admitted as he is not yet at his baseline, is impulsive

## 2022-08-26 NOTE — PROGRESS NOTE ADULT - ASSESSMENT
52yo Male, history of bipolar d/o, worsening behavior over the past 1-2 years per family (gambling addition, erratic moods), medication non compliance, recently admitted for syncope c/b facial laceration a/w recurrent episodes of syncope, and uncontrolled bipolar I d/o and sowmya; consult cards 8/15, syncopal work up thus far negative. Rec;s ILR from EP placed on 8/19. D/c to [facility]. Uptitrating Abilify.    Called bedboard to get patient transferred to  8/16-17.

## 2022-08-26 NOTE — BH CONSULTATION LIAISON PROGRESS NOTE - NS ED BHA MED ROS PSYCHIATRIC
Pharmacy not filling aldactone  BP high, will cancel apt  R/s    Having dental pain, sent augmentin. To f/u with dentist. No obvious abscess.       
See HPI

## 2022-08-26 NOTE — PROGRESS NOTE ADULT - PROBLEM SELECTOR PLAN 1
Seems manic with constricted affect (euphoric), hyperactive, poor insight to own medical problems, pressured rate of speech;   consult appreciated.   - Pt does not have capacity to leave AMA  - 1:1 monitoring  for elopement risk.   Pt is at elevated risk for inadvertent injury to self/others due to exacerbation and intensity of symptoms and will therefore require admission to inpatient psychiatry at this time.   - Patient has been non complaint with medication  - Restart Abilify 5mg QHS (consider CONLEY)--> incr to 15 mg daily, Haldol 5mg PO/IM PRN.   - CL to follow.  - Considered frontotemporal dementia --> last MRI in 2014 was not c/w this, nor was his CT head this admission.
Seems manic with constricted affect (euphoric), hyperactive, poor insight to own medical problems, pressured rate of speech;   consult appreciated.   - Pt does not have capacity to leave AMA  - 1:1 monitoring  for elopement risk.   Pt is at elevated risk for inadvertent injury to self/others due to exacerbation and intensity of symptoms and will therefore require admission to inpatient psychiatry at this time.   - Patient has been non complaint with medication  - Restart Abilify 5mg QHS (consider CONLEY)--> incr to 15 mg daily, Haldol 5mg PO/IM PRN.   - CL to follow.  - Considered frontotemporal dementia --> last MRI in 2014 was not c/w this, nor was his CT head this admission.
Seems manic with constricted affect (euphoric), hyperactive, poor insight to own medical problems, pressured rate of speech;   consult appreciated.   - Pt does not have capacity to leave AMA  - 1:1 monitoring  for elopement risk.   Pt is at elevated risk for inadvertent injury to self/others due to exacerbation and intensity of symptoms and will therefore require admission to inpatient psychiatry at this time.   - Patient has been non complaint with medication  - Restart Abilify 5mg QHS (consider CONLEY), Haldol 5mg PO/IM PRN.   - CL to follow.
Seems manic with constricted affect (euphoric), hyperactive, poor insight to own medical problems, pressured rate of speech;   consult appreciated.   - Pt does not have capacity to leave AMA  - 1:1 monitoring  for elopement risk.   Pt is at elevated risk for inadvertent injury to self/others due to exacerbation and intensity of symptoms and will therefore require admission to inpatient psychiatry at this time.   - Patient has been non complaint with medication  - Restart Abilify 5mg QHS (consider CONLEY)--> incr to 15 mg daily, Haldol 5mg PO/IM PRN.   - CL to follow.  - Considered frontotemporal dementia --> last MRI in 2014 was not c/w this, nor was his CT head this admission.
Seems manic with constricted affect (euphoric), hyperactive, poor insight to own medical problems, pressured rate of speech; Ongoing. Today, demonstrated pressure speech and poor insight.    consult appreciated.   - Pt does not have capacity to leave AMA  - 1:1 monitoring  for elopement risk.   Pt is at elevated risk for inadvertent injury to self/others due to exacerbation and intensity of symptoms and will therefore require admission to inpatient psychiatry at this time.   - Patient has been non complaint with medication  - Restart Abilify 5mg QHS (consider CONLEY)--> incr to 15 mg daily, Haldol 5mg PO/IM PRN.   - CL to follow.  - Considered frontotemporal dementia --> last MRI in 2014 was not c/w this, nor was his CT head this admission.
Seems manic with constricted affect (euphoric), hyperactive, poor insight to own medical problems, pressured rate of speech;   consult appreciated.   - Pt does not have capacity to leave AMA  - 1:1 monitoring  for elopement risk.   Pt is at elevated risk for inadvertent injury to self/others due to exacerbation and intensity of symptoms and will therefore require admission to inpatient psychiatry at this time.   - Patient has been non complaint with medication  - Restart Abilify 5mg QHS (consider CONLEY), Haldol 5mg PO/IM PRN.   - CL to follow.
Seems manic with constricted affect (euphoric), hyperactive, poor insight to own medical problems, pressured rate of speech;   consult appreciated.   - Pt does not have capacity to leave AMA  - 1:1 monitoring  for elopement risk.   Pt is at elevated risk for inadvertent injury to self/others due to exacerbation and intensity of symptoms and will therefore require admission to inpatient psychiatry at this time.   - Patient has been non complaint with medication  - Restart Abilify 5mg QHS (consider CONLEY), Haldol 5mg PO/IM PRN.   - CL to follow.
Seems manic with constricted affect (euphoric), hyperactive, poor insight to own medical problems, pressured rate of speech; Ongoing. Today, demonstrated pressure speech and poor insight.    consult appreciated.   - Pt does not have capacity to leave AMA  - 1:1 monitoring  for elopement risk.   Pt is at elevated risk for inadvertent injury to self/others due to exacerbation and intensity of symptoms and will therefore require admission to inpatient psychiatry at this time.   - Patient has been non complaint with medication  - Restart Abilify 5mg QHS (consider CONLEY)--> incr to 15 mg daily, Haldol 5mg PO/IM PRN.   - CL to follow.  - Considered frontotemporal dementia --> last MRI in 2014 was not c/w this, nor was his CT head this admission.
Seems manic with constricted affect (euphoric), hyperactive, poor insight to own medical problems, pressured rate of speech;   consult appreciated.   - Pt does not have capacity to leave AMA  - 1:1 monitoring  for elopement risk.   Pt is at elevated risk for inadvertent injury to self/others due to exacerbation and intensity of symptoms and will therefore require admission to inpatient psychiatry at this time.   - Patient has been non complaint with medication  - Restart Abilify 5mg QHS (consider CONLEY), Haldol 5mg PO/IM PRN.   - CL to follow.
Seems manic with constricted affect (euphoric), hyperactive, poor insight to own medical problems, pressured rate of speech;   consult appreciated.   - Pt does not have capacity to leave AMA  - 1:1 monitoring  for elopement risk.   Pt is at elevated risk for inadvertent injury to self/others due to exacerbation and intensity of symptoms and will therefore require admission to inpatient psychiatry at this time.   - Patient has been non complaint with medication  - Restart Abilify 5mg QHS (consider CONLEY), Haldol 5mg PO/IM PRN.   - CL to follow.
Seems manic with constricted affect (euphoric), hyperactive, poor insight to own medical problems, pressured rate of speech;   consult appreciated.   - Pt does not have capacity to leave AMA  - 1:1 monitoring  for elopement risk.   Pt is at elevated risk for inadvertent injury to self/others due to exacerbation and intensity of symptoms and will therefore require admission to inpatient psychiatry at this time.   - Patient has been non complaint with medication  - Restart Abilify 5mg QHS (consider CONLEY)--> incr to 15 mg daily, Haldol 5mg PO/IM PRN.   - CL to follow.  - Considered frontotemporal dementia --> last MRI in 2014 was not c/w this, nor was his CT head this admission.
Seems manic with constricted affect (euphoric), hyperactive, poor insight to own medical problems, pressured rate of speech;   consult appreciated.   - Pt does not have capacity to leave AMA  - 1:1 monitoring  for elopement risk.   Pt is at elevated risk for inadvertent injury to self/others due to exacerbation and intensity of symptoms and will therefore require admission to inpatient psychiatry at this time.   - Patient has been non complaint with medication  - Restart Abilify 5mg QHS (consider CONLEY), Haldol 5mg PO/IM PRN.   - CL to follow.  - Considered frontotemporal dementia --> last MRI in 2014 was not c/w this, nor was his CT head this admission.

## 2022-08-26 NOTE — BH CONSULTATION LIAISON PROGRESS NOTE - NSBHINDICATION_PSY_ALL_CORE
elopement risk, sowmya

## 2022-08-26 NOTE — PROGRESS NOTE ADULT - PROBLEM SELECTOR PLAN 5
VTE with Lovenox 40 mg sub cut daily.  Fall, Aspiration, safety seizure precautions. VTE with Lovenox 40 mg sub cut daily.  Fall, Aspiration, safety seizure precautions.    Dispo: pending psych bed - medically ready for discharge to psychiatric facility.     >30 minutes of discharge planning time.

## 2022-08-26 NOTE — DISCHARGE NOTE NURSING/CASE MANAGEMENT/SOCIAL WORK - NSDCVIVACCINE_GEN_ALL_CORE_FT
Tdap; 06-Aug-2022 19:11; Chikis Barker (AYESHA); [employer]; [id number] (Exp. Date: 14-May-2024); IntraMuscular; Deltoid Left.; 0.5 milliLiter(s); VIS (VIS Published: 09-May-2013, VIS Presented: 06-Aug-2022);

## 2022-08-26 NOTE — BH PATIENT PROFILE - FALL HARM RISK - RISK INTERVENTIONS

## 2022-08-26 NOTE — BH CONSULTATION LIAISON PROGRESS NOTE - NSBHCONSULTSUBSTANCEALCOHOL_PSY_A_CORE FT
As above
Ativan-symptom-triggered ciwa
As above
Ativan-symptom-triggered ciwa
As above
Ativan-symptom-triggered ciwa
Ativan-symptom-triggered ciwa

## 2022-08-26 NOTE — PROGRESS NOTE ADULT - PROBLEM SELECTOR PLAN 3
Alcohol blood = 11  Per BH: substance use- symptom trigger CIWA only.
Alcohol blood = 11  Per BH: substance use- symptom trigger CIWA only. Discontinued.
Alcohol blood = 11  Per BH: substance use- symptom trigger CIWA only.
Alcohol blood = 11  Per BH: substance use- symptom trigger CIWA only. Discontinued.
Alcohol blood = 11  Per BH: substance use- symptom trigger CIWA only.

## 2022-08-26 NOTE — PROGRESS NOTE ADULT - PROVIDER SPECIALTY LIST ADULT
Electrophysiology
Hospitalist
Electrophysiology
Hospitalist

## 2022-08-26 NOTE — PROGRESS NOTE ADULT - PROBLEM SELECTOR PROBLEM 5
Medication management
Preventive measure
Medication management
Preventive measure
Medication management

## 2022-08-26 NOTE — BH CONSULTATION LIAISON PROGRESS NOTE - NSBHPTASSESSDT_PSY_A_CORE
15-Aug-2022 15:18
17-Aug-2022 13:36
26-Aug-2022 14:21
14-Aug-2022 11:53
22-Aug-2022 14:47
13-Aug-2022 15:16
18-Aug-2022 14:18
16-Aug-2022 12:26
19-Aug-2022 13:18
23-Aug-2022 12:19

## 2022-08-26 NOTE — BH CONSULTATION LIAISON PROGRESS NOTE - NSBHATTESTTYPEVISIT_PSY_A_CORE
Attending Only
Attending Only
KENNA without on-site Attending supervision
Attending Only
Attending with Resident/Fellow/Student
Attending evaluating patient with KENNA ([id number] code)
Resident/Fellow with telephonic supervision

## 2022-08-27 PROCEDURE — 99231 SBSQ HOSP IP/OBS SF/LOW 25: CPT

## 2022-08-27 RX ORDER — ACETAMINOPHEN 500 MG
650 TABLET ORAL EVERY 6 HOURS
Refills: 0 | Status: DISCONTINUED | OUTPATIENT
Start: 2022-08-27 | End: 2022-09-07

## 2022-08-27 RX ADMIN — Medication 50 MILLIGRAM(S): at 22:47

## 2022-08-27 RX ADMIN — Medication 650 MILLIGRAM(S): at 22:31

## 2022-08-27 RX ADMIN — Medication 650 MILLIGRAM(S): at 23:31

## 2022-08-27 RX ADMIN — DIVALPROEX SODIUM 500 MILLIGRAM(S): 500 TABLET, DELAYED RELEASE ORAL at 20:19

## 2022-08-27 RX ADMIN — Medication 650 MILLIGRAM(S): at 14:50

## 2022-08-27 RX ADMIN — DIVALPROEX SODIUM 500 MILLIGRAM(S): 500 TABLET, DELAYED RELEASE ORAL at 08:31

## 2022-08-27 RX ADMIN — HALOPERIDOL DECANOATE 5 MILLIGRAM(S): 100 INJECTION INTRAMUSCULAR at 22:47

## 2022-08-27 RX ADMIN — ARIPIPRAZOLE 20 MILLIGRAM(S): 15 TABLET ORAL at 08:31

## 2022-08-27 RX ADMIN — Medication 2 MILLIGRAM(S): at 20:19

## 2022-08-27 NOTE — BH INPATIENT PSYCHIATRY PROGRESS NOTE - NSBHASSESSSUMMFT_PSY_ALL_CORE
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 Jeovanny Narvaez at [facility], hx of gambling, no substance use disorder, + recent verbally aggressive behavior. + recent multiple legal issues- speeding citations, brought in by family for LOC. 2 recent falls and seen at [facility] ED last on 8/7/22- suture to head and signed out AMA refusing cardiac/telemetry work up. Psych consulted for medication management. As patient was being followed by C/L team, appeared to be manic and not at his baseline per family. Patient transferred to [facility] for decompensation of bipolar disorder.    On interview, patient has no complaints, does not feel he has a mental illness and does not feel he requires hospitalization. He is calm, superficial, has linear thought process and perseveration on discharge. Given collateral and labile mood seen by C/L team, patient's presentation is concerning for decompensated bipolar disorder in the setting on medication noncompliance. Will continue medication regimen from C/L team of Abilify 20 mg qd and Depakote 500 mg bid.    Patient remains elevated and perseverative about discharge. Denies any SI.  Continue with plan:    Plan:  1.	Legals: admit on 9.27 status.  2.	Safety: routine observation, denies SI/HI/I/P on the unit. Based on risk assessment evaluation, patient IS NOT at acute risk of harm to self or others at this time, DOES NOT require 1:1 CO. PRNs: Ativan 2 mg/Haldol 5 mg/Benadryl 50 mg PO q6h/IM once for agitation.  3.	Psychiatry: Abilify 20 mg qd and Depakote 500 mg bid.  4.	Group, milieu, individual therapy as appropriate.  5.	Medical: no acute medical issues, no significant PMH.  Admission labs WNL.  6.	Dispo: pending clinical improvement.  Patient continues to require inpatient hospitalization for stabilization and safety.

## 2022-08-27 NOTE — PSYCHIATRIC REHAB INITIAL EVALUATION - NSBHPRRECOMMEND_PSY_ALL_CORE
Writer met with patient in order to orient patient to unit and briefly introduce patient to psychiatric rehabilitation staff and department function. Pt was provided with a copy of unit schedule. Patient is not a reliable historian due to poor insight. According to patient, he is currently admitted because his brother wants to take all is money. Chart reports patient is currently admitted because patient has been non-compliant with medication. Patient has been increasingly manic at home. Upon arrival to [facility], patient is manic and requires boundary setting at times. Patient and writer established a collaborative psychiatric rehabilitation goal. Writer encouraged patient to attend psychiatric rehabilitation groups and engage in treatment. Psychiatric rehabilitation staff will engage patient daily.

## 2022-08-27 NOTE — CONSULT NOTE ADULT - SUBJECTIVE AND OBJECTIVE BOX
HPI:   51 year old male with exacerbation of Bipolar Disorder [facility] with [facility].  Work up negative so far.  Pt now transferred to [facility].    PAST MEDICAL & SURGICAL HISTORY:  Bipolar 1 disorder        No significant past surgical history        Review of Systems:   CONSTITUTIONAL: No fever, weight loss, or fatigue  EYES: No eye pain, visual disturbances, or discharge  ENMT:  No difficulty hearing, tinnitus, vertigo; No sinus or throat pain  NECK: No pain or stiffness  RESPIRATORY: No cough, wheezing, chills or hemoptysis; No shortness of breath  CARDIOVASCULAR: No chest pain, palpitations, dizziness, or leg swelling, +SYNCOPE  GASTROINTESTINAL: No abdominal or epigastric pain. No nausea, vomiting, or hematemesis; No diarrhea or constipation. No melena or hematochezia.  GENITOURINARY: No dysuria, frequency, hematuria, or incontinence  NEUROLOGICAL: No headaches, memory loss, loss of strength, numbness, or tremors  SKIN: No itching, burning, rashes, or lesions   LYMPH NODES: No enlarged glands  ENDOCRINE: No heat or cold intolerance; No hair loss  MUSCULOSKELETAL: No joint pain or swelling; No muscle, back, or extremity pain  HEME/LYMPH: No easy bruising, or bleeding gums  ALLERY AND IMMUNOLOGIC: No hives or eczema    Allergies    penicillin (Hives)  penicillins (Rash)        Social History:  -CIGS, -ETOH, -DRUGS    FAMILY HISTORY:  FH: depression (Sibling)    FH: Alzheimers disease (Father)        MEDICATIONS  (STANDING):  ARIPiprazole 20 milliGRAM(s) Oral daily  diVALproex  milliGRAM(s) Oral two times a day    MEDICATIONS  (PRN):  diphenhydrAMINE 50 milliGRAM(s) Oral every 4 hours PRN agitation / eps  diphenhydrAMINE Injectable 50 milliGRAM(s) IntraMuscular once PRN agitation / eps  haloperidol     Tablet 5 milliGRAM(s) Oral every 6 hours PRN agitation  haloperidol    Injectable 5 milliGRAM(s) IntraMuscular once PRN agitation  LORazepam     Tablet 2 milliGRAM(s) Oral every 6 hours PRN agitation  LORazepam   Injectable 2 milliGRAM(s) IntraMuscular once PRN agitation    VS:  97.2  SITTING 111/83  53 STANDING  107/77 71    PHYSICAL EXAM:  GENERAL: NAD, well-developed  HEAD:  Atraumatic, Normocephalic  EYES: EOMI,  conjunctiva and sclera clear  NECK: Supple, No JVD  CHEST/LUNG: Clear to auscultation bilaterally; No wheeze  HEART: Regular rate and rhythm; No murmurs, rubs, or gallops  ABDOMEN: Soft, Nontender, Nondistended; Bowel sounds present  EXTREMITIES:   No clubbing, cyanosis, or edema  NEUROLOGY: non-focal  SKIN: No rashes or lesions    LABS:  2022  COVID PCR NEG    2022  WBC  8.49  H/H  14.6/42.5 PLAT 215    2022   K 4.2  CO2 24 GLUC 90    2022  HGAIC 5.5 CHOL 187 TRIG 222 HDL 38  TSH 1.24 B12 505 FOLATE 12.7       EK2022  SB 55 INCOMPLETE RBBB  2022  ECHO NL LV SYSTOLIC FUNCTION  2022 CAROTID US  1-15% STENOSIS     RADIOLOGY & ADDITIONAL TESTS:  2022 CT OF HEAD:  NO ACUTE INTRACRANIAL HEMORRHAGE OR INFARCT  2022  CT MAX FACIAL:  NO FX    Consultant(s) Notes Reviewed:  NOTES REVIEWED    Care Discussed with Consultants/Other Providers:  ATTENDING AND STAFF   HPI:   51 year old male with exacerbation of Bipolar Disorder admitted to [facility] with Syncope.  Work up negative so far.  Pt now transferred to [facility].    PAST MEDICAL & SURGICAL HISTORY:  Bipolar 1 disorder        No significant past surgical history        Review of Systems:   CONSTITUTIONAL: No fever, weight loss, or fatigue  EYES: No eye pain, visual disturbances, or discharge  ENMT:  No difficulty hearing, tinnitus, vertigo; No sinus or throat pain  NECK: No pain or stiffness  RESPIRATORY: No cough, wheezing, chills or hemoptysis; No shortness of breath  CARDIOVASCULAR: No chest pain, palpitations, dizziness, or leg swelling, +SYNCOPE  GASTROINTESTINAL: No abdominal or epigastric pain. No nausea, vomiting, or hematemesis; No diarrhea or constipation. No melena or hematochezia.  GENITOURINARY: No dysuria, frequency, hematuria, or incontinence  NEUROLOGICAL: No headaches, memory loss, loss of strength, numbness, or tremors  SKIN: No itching, burning, rashes, or lesions   LYMPH NODES: No enlarged glands  ENDOCRINE: No heat or cold intolerance; No hair loss  MUSCULOSKELETAL: No joint pain or swelling; No muscle, back, or extremity pain  HEME/LYMPH: No easy bruising, or bleeding gums  ALLERY AND IMMUNOLOGIC: No hives or eczema    Allergies    penicillin (Hives)  penicillins (Rash)        Social History:  -CIGS, -ETOH, -DRUGS    FAMILY HISTORY:  FH: depression (Sibling)    FH: Alzheimers disease (Father)        MEDICATIONS  (STANDING):  ARIPiprazole 20 milliGRAM(s) Oral daily  diVALproex  milliGRAM(s) Oral two times a day    MEDICATIONS  (PRN):  diphenhydrAMINE 50 milliGRAM(s) Oral every 4 hours PRN agitation / eps  diphenhydrAMINE Injectable 50 milliGRAM(s) IntraMuscular once PRN agitation / eps  haloperidol     Tablet 5 milliGRAM(s) Oral every 6 hours PRN agitation  haloperidol    Injectable 5 milliGRAM(s) IntraMuscular once PRN agitation  LORazepam     Tablet 2 milliGRAM(s) Oral every 6 hours PRN agitation  LORazepam   Injectable 2 milliGRAM(s) IntraMuscular once PRN agitation    VS:  97.2  SITTING 111/83  53 STANDING  107/77 71    PHYSICAL EXAM:  GENERAL: NAD, well-developed  HEAD:  LEFT CHEEK NO SWELLING NO EDEMA,  +PAIN WITH PALPATION  EYES: EOMI,  conjunctiva and sclera clear  NECK: Supple, No JVD  CHEST/LUNG: Clear to auscultation bilaterally; No wheeze  HEART: Regular rate and rhythm; No murmurs, rubs, or gallops  ABDOMEN: Soft, Nontender, Nondistended; Bowel sounds present  EXTREMITIES:   No clubbing, cyanosis, or edema  NEUROLOGY: non-focal  SKIN: No rashes or lesions    LABS:  2022  COVID PCR NEG    2022  WBC  8.49  H/H  14.6/42.5 PLAT 215    2022   K 4.2  CO2 24 GLUC 90    2022  HGAIC 5.5 CHOL 187 TRIG 222 HDL 38  TSH 1.24 B12 505 FOLATE 12.7       EK2022  SB 55 INCOMPLETE RBBB  2022  ECHO NL LV SYSTOLIC FUNCTION  2022 CAROTID US  1-15% STENOSIS     RADIOLOGY & ADDITIONAL TESTS:  2022 CT OF HEAD:  NO ACUTE INTRACRANIAL HEMORRHAGE OR INFARCT  2022  CT MAX FACIAL:  NO FX    Consultant(s) Notes Reviewed:  NOTES REVIEWED    Care Discussed with Consultants/Other Providers:  ATTENDING AND STAFF

## 2022-08-27 NOTE — BH INPATIENT PSYCHIATRY PROGRESS NOTE - NSBHFUPINTERVALHXFT_PSY_A_CORE
Chart reviewed and case discussed with treatment team. No events reported overnight. Sleep and appetite is fair. Patient stated that he is unsure of why he is here and is focused on discharge. He is cooperative and denies any SI/HI. Patient is compliant with medications, no adverse effects reported.

## 2022-08-27 NOTE — CONSULT NOTE ADULT - ASSESSMENT
51 year old male with Bipolar 1 Disorder now with exacerbation originally admitted to [facility] for Syncope with negative w/u so far.  1.  SYNCOPE:  PT WITH LOOP RECORDER.   NO ORTHOSTATIC CHANGES AT [facility].  ECHO WNL.    2.  COVID PCR NEG  3.  PSYCH: AS PER ATTENDING 51 year old male with Bipolar 1 Disorder now with exacerbation originally admitted to [facility] for Syncope with negative w/u so far.  1.  SYNCOPE:  PT WITH LOOP RECORDER.   NO ORTHOSTATIC CHANGES AT [facility].  ECHO WNL.    2.  COVID PCR NEG  3.  PSYCH: AS PER ATTENDING  4.  LEFT FACIAL CHEEK PAIN AFTER TRAUMA FROM BALL.  SOFT TISSUE TRAUMA. TYLENOL PRN.  IF PERSISTS, CONSIDER FACIAL XRAYS.

## 2022-08-27 NOTE — CONSULT NOTE ADULT - CONSULT REASON
Patient : Lester Reis Age: 72 year old Sex: male   MRN: [id number] Encounter Date: 3/2/2022      History     Chief Complaint   Patient presents with   •  Symptoms     HPI  72-year-old male with history of Parkinson's disease, HTN, BPH comes in the nursing home for evaluation after being noted to have significant amount of bleeding and clots from Trinidad removal at 6 AM today for trial of void.  He reports not being able to urinate since.  Patient reports significant discomfort in his lower abdomen region otherwise denies cough, shortness of breath, fever, diarrhea, N/V.     No Known Allergies    Current Discharge Medication List      Prior to Admission Medications    Details   carbidopa-levodopa (SINEMET)  MG per tablet Take 1 tablet by mouth 4 times daily. 0900,1200,1700,2100      cloNIDine (CATAPRES-TTS 1) 0.1 MG/24HR Place 1 patch onto the skin 1 day a week. Box includes patch and non-medicated adhesive cover. Apply adhesive cover if patch begins to lift.  Qty: 4 patch, Refills: 0      escitalopram (LEXAPRO) 5 MG tablet Take 1 tablet by mouth daily (with breakfast). Do not start before February 13, 2022.  Qty: 30 tablet, Refills: 0      polyethylene glycol (MIRALAX) 17 g packet Take 17 g by mouth daily.       vitamin B-12 (CYANOCOBALAMIN) 1000 MCG tablet Take 1,000 mcg by mouth daily.      Multiple Vitamins-Minerals (CEROVITE SENIOR PO) Take 1 tablet by mouth daily.      acetaminophen (TYLENOL) 325 MG tablet Take 625 mg by mouth every 6 hours as needed for Pain.       atenolol (TENORMIN) 100 MG tablet Take 100 mg by mouth daily.      chlorthalidone (THALITONE) 25 MG tablet Take 25 mg by mouth daily.      docusate sodium (COLACE) 100 MG capsule Take 100 mg by mouth daily.       losartan (COZAAR) 100 MG tablet Take 100 mg by mouth daily.      QUEtiapine (SEROquel) 25 MG tablet Take 25 mg by mouth at bedtime.       tamsulosin (FLOMAX) 0.4 MG Cap Take 0.4 mg by mouth nightly.              Current Discharge  Medication List          Past Medical History:   Diagnosis Date   • Cognitive disorder    • Fracture    • Hypertension    • Parkinson's disease (CMS/HCC)        Past Surgical History:   Procedure Laterality Date   • APPENDECTOMY     • FRACTURE SURGERY     • TONSILLECTOMY         No family history on file.    Social History     Tobacco Use   • Smoking status: Never Smoker   • Smokeless tobacco: Never Used   Vaping Use   • Vaping Use: never used   Substance Use Topics   • Alcohol use: Not Currently   • Drug use: Never       Review of Systems  Ten point ROS  (Skin, constitutional, Eyes, ENT, CV, Pulm, GI, , Neuro, MSK) Neg except per HPI  Constitutional:  No fever, no chills  Eyes:  No eye pain, no vision changes  ENT:  No sore throat, no rhinorrhea  CV:  No chest pain, no palpitations  Pulm:  No shortness of breath, no cough  GI:  No abdominal pain, no nausea, no vomiting, no diarrhea  : +hematuria, No dysuria,  Neuro:  No numbness, no tingling, no weakness  MSK:  No myalgias, no swelling  Skin:  No rashes, no bruising    Physical Exam     ED Triage Vitals   ED Triage Vitals Group      Temp 03/02/22 0849 100.2 °F (37.9 °C)      Heart Rate 03/02/22 0849 96      Resp 03/02/22 0849 17      BP 03/02/22 0849 (!) 160/109      SpO2 03/02/22 0849 94 %      EtCO2 mmHg --       Height 03/02/22 1246 5' 8\" (1.727 m)      Weight 03/02/22 1148 150 lb (68 kg)      Weight Scale Used 03/02/22 1148 ED Stated      BMI (Calculated) 03/02/22 1246 23.06      IBW/kg (Calculated) 03/02/22 1246 68.4       Physical Exam    Constitutional: Awake, alert, NAD  Head: Atraumatic, normocephalic  Eyes: PERRL, EOMI, sclera non-icteric  ENT: Mucous membranes moist, no pharyngeal erythema or exudate  Neck: Supple, trachea midline  CVS: RRR, no murmurs, 2+ radial pulses  Respiratory/chest: No respiratory distress, breathing not labored, normal chest rise, lungs CTA bilaterally, no wheezing  Abdomen: soft, non-tender, non-distended  Back: No  step-offs, no CVA tenderness  : Clots noted around head of penis, normal appearing penis without active bleeding.   Neuro: A&Ox3, no facial droop, normal speech, moves all extremities equally, sensation grossly intact  Extremities: Normal ROM, no swelling, no deformities  Skin: Warm, dry  Psych: Cooperative, normal mood, normal affect    Lab Results     Results for orders placed or performed during the hospital encounter of 03/02/22   Comprehensive Metabolic Panel   Result Value Ref Range    Fasting Status      Sodium 140 135 - 145 mmol/L    Potassium 3.3 (L) 3.4 - 5.1 mmol/L    Chloride 106 98 - 107 mmol/L    Carbon Dioxide 27 21 - 32 mmol/L    Anion Gap 10 10 - 20 mmol/L    Glucose 116 (H) 70 - 99 mg/dL    BUN 28 (H) 6 - 20 mg/dL    Creatinine 1.06 0.67 - 1.17 mg/dL    Glomerular Filtration Rate 69 >=60    BUN/ Creatinine Ratio 26 (H) 7 - 25    Calcium 9.0 8.4 - 10.2 mg/dL    Bilirubin, Total 0.9 0.2 - 1.0 mg/dL    GOT/AST 22 <=37 Units/L    GPT/ALT 26 <64 Units/L    Alkaline Phosphatase 149 (H) 45 - 117 Units/L    Albumin 3.2 (L) 3.6 - 5.1 g/dL    Protein, Total 7.0 6.4 - 8.2 g/dL    Globulin 3.8 2.0 - 4.0 g/dL    A/G Ratio 0.8 (L) 1.0 - 2.4   Urinalysis & Reflex Microscopy With Culture If Indicated   Result Value Ref Range    COLOR, URINALYSIS Pink     APPEARANCE, URINALYSIS Hazy     GLUCOSE, URINALYSIS Negative Negative mg/dL    BILIRUBIN, URINALYSIS Negative Negative    KETONES, URINALYSIS Trace (A) Negative mg/dL    SPECIFIC GRAVITY, URINALYSIS 1.009 1.005 - 1.030    OCCULT BLOOD, URINALYSIS Large (A) Negative    PH, URINALYSIS 7.0 5.0 - 7.0    PROTEIN, URINALYSIS 30  (A) Negative mg/dL    UROBILINOGEN, URINALYSIS 0.2 0.2, 1.0 mg/dL    NITRITE, URINALYSIS Negative Negative    LEUKOCYTE ESTERASE, URINALYSIS Trace (A) Negative    SQUAMOUS EPITHELIAL, URINALYSIS None Seen None Seen, 1 to 5 /hpf    ERYTHROCYTES, URINALYSIS >100 (A) None Seen, 1 to 2 /hpf    LEUKOCYTES, URINALYSIS 11 to 25 (A) None Seen, 1 to 5  /hpf    BACTERIA, URINALYSIS Few (A) None Seen /hpf    HYALINE CASTS, URINALYSIS None Seen None Seen, 1 to 5 /lpf   CBC with Automated Differential (performable only)   Result Value Ref Range    WBC 26.6 (H) 4.2 - 11.0 K/mcL    RBC 3.83 (L) 4.50 - 5.90 mil/mcL    HGB 12.3 (L) 13.0 - 17.0 g/dL    HCT 37.4 (L) 39.0 - 51.0 %    MCV 97.7 78.0 - 100.0 fl    MCH 32.1 26.0 - 34.0 pg    MCHC 32.9 32.0 - 36.5 g/dL    RDW-CV 14.2 11.0 - 15.0 %    RDW-SD 50.8 (H) 39.0 - 50.0 fL     140 - 450 K/mcL    NRBC 0 <=0 /100 WBC    Neutrophil, Percent 95 %    Lymphocytes, Percent 2 %    Mono, Percent 2 %    Eosinophils, Percent 0 %    Basophils, Percent 0 %    Immature Granulocytes 1 %    Absolute Neutrophils 25.3 (H) 1.8 - 7.7 K/mcL    Absolute Lymphocytes 0.5 (L) 1.0 - 4.0 K/mcL    Absolute Monocytes 0.6 0.3 - 0.9 K/mcL    Absolute Eosinophils  0.0 0.0 - 0.5 K/mcL    Absolute Basophils 0.1 0.0 - 0.3 K/mcL    Absolute Immmature Granulocytes 0.2 0.0 - 0.2 K/mcL   Prothrombin Time   Result Value Ref Range    Prothrombin Time 11.8 9.7 - 11.8 sec    INR 1.1     Partial Thromboplastin Time   Result Value Ref Range    PTT 23 22 - 30 sec   Lactic Acid Venous With Reflex   Result Value Ref Range    Lactate, Venous 2.3 (HH) 0.0 - 2.0 mmol/L   Lactic Acid, Venous   Result Value Ref Range    Lactate, Venous 2.8 (HH) 0.0 - 2.0 mmol/L   Magnesium   Result Value Ref Range    Magnesium 1.7 1.7 - 2.4 mg/dL   COVID/Flu/RSV panel   Result Value Ref Range    Rapid SARS-COV-2 by PCR Not Detected Not Detected / Detected / Presumptive Positive / Inhibitors present    Influenza A by PCR Not Detected Not Detected    Influenza B by PCR Not Detected Not Detected    RSV BY PCR Not Detected Not Detected    Isolation Guidelines      Procedural Comment     Lactic Acid, Venous   Result Value Ref Range    Lactate, Venous 2.6 (HH) 0.0 - 2.0 mmol/L   TYPE/SCREEN   Result Value Ref Range    ABO/RH(D) O Rh Positive     ANTIBODY SCREEN Negative     TYPE AND SCREEN  Asked by attending to see tis 51 year old male with Bipolar Disorder now with exacerbation.  Pt originally admitted to [facility] for syncope.  W/U so far negative.  Pt had Loop Recorder placed to monitor for arrhythmias.  Pt with facial trauma but CT Maxilofacial negative for fx.  Pt now transferred to [facility]. EXPIRATION DATE 03/05/2022 23:59    Blood Culture    Specimen: Blood   Result Value Ref Range    Culture, Blood or Bone Marrow No Growth <24 hours    Blood Culture    Specimen: Blood   Result Value Ref Range    Culture, Blood or Bone Marrow No Growth <24 hours          Radiology Results     Imaging Results          XR CHEST PA OR AP 1 VIEW (Final result)  Result time 03/02/22 10:09:32    Final result                 Impression:    No abnormality.    Electronically Signed by: DARYL COLE M.D.   Signed on: 3/2/2022 10:09 AM                Narrative:    EXAM/TECHNIQUE:XR CHEST PA OR AP 1 VIEW.    CLINICAL INDICATION: Shortness of breath.    COMPARISON: 02/07/2022.    FINDINGS: Heart size is normal.  Superior mediastinum is not widened.   Lungs are clear.                                ED Medication Orders (From admission, onward)    Ordered Start     Status Ordering Provider    03/02/22 1226 03/02/22 1230  sodium chloride (NORMAL SALINE) 0.9 % bolus 1,000 mL  ONCE         Last MAR action: ADA Richter    03/02/22 1039 03/02/22 1045  sodium chloride (NORMAL SALINE) 0.9 % bolus 1,000 mL  ONCE         Last MAR action: Completed DEA POTTER    03/02/22 1038 03/02/22 1045  cefepime (MAXIPIME) 2,000 mg in sodium chloride 0.9 % 100 mL IVPB  ONCE         Last MAR action: Completed DEA POTTER    03/02/22 0847 03/02/22 0847  lidocaine 2% urethral (UROJET) 2 % jelly 10 mL  (ED Insert Trinidad Panel)  ONCE PRN         Last MAR action: Given DEA POTTER            MDM  Pt w/ hx and physical exam as above. Vital signs are stable. Patient was mildly hypertensive in 140s over 90s. He was afebrile, nontachycardic, saturating on room air. Patient appeared moderately uncomfortable with a distended tender bladder was noted to have 800 cc output of red urine after Trinidad placement. Labs notable for leukocytosis, mild hypkalemia but otherwise stable hemoglobin. UA suggestive of UTI. CXR negative. Presentation most concerning  for acute urinary retention c/b UTI. Retention likely from his known history of bph. Urology was consulted for significant hematuria. Patient was started empirically on IV cefepime and fluid resuscitated with improvement of his symptoms. Pt was admitted for acute urinary retention.       ED Course       ED Course as of 03/02/22 1650   Wed Mar 02, 2022   1003 800cc of red urine output in 3way arias per  [CC]      ED Course User Index  [CC] Bobby Rios       Procedures    Clinical Impression     ED Diagnosis   1. Acute urinary retention     2. Urinary tract infection with hematuria, site unspecified         Disposition        Admit 3/2/2022 10:42 AM  Telemetry Bed?: No  Admitting Physician: KRZYSZTOF PRESLEY [[id number]]  Is this a telephone or verbal order?: This is a telephone order from the admitting physician  Transferring Patient to? Only adjust for [facility] [facility] [[id number]]      Case discussed with and pt evaluated by attending  . Refer to attending note for further documentation.    Bobby Rios, DO  Emergency Medicine PGY-1     Bobby Rios  Resident  03/02/22 1352     Asked by attending to see tis 51 year old male with Bipolar Disorder now with exacerbation.  Pt originally admitted to [facility] for syncope.  W/U so far negative.  Pt had Loop Recorder placed to monitor for arrhythmias.  Pt with facial trauma but CT Maxilofacial negative for fx.  Pt now transferred to [facility].  One hour ago 1:30 pm patient his in face with ball now with left cheek pain.

## 2022-08-28 PROCEDURE — 99231 SBSQ HOSP IP/OBS SF/LOW 25: CPT

## 2022-08-28 RX ADMIN — Medication 2 MILLIGRAM(S): at 20:08

## 2022-08-28 RX ADMIN — ARIPIPRAZOLE 20 MILLIGRAM(S): 15 TABLET ORAL at 08:42

## 2022-08-28 RX ADMIN — DIVALPROEX SODIUM 500 MILLIGRAM(S): 500 TABLET, DELAYED RELEASE ORAL at 20:08

## 2022-08-28 RX ADMIN — DIVALPROEX SODIUM 500 MILLIGRAM(S): 500 TABLET, DELAYED RELEASE ORAL at 08:44

## 2022-08-28 NOTE — BH INPATIENT PSYCHIATRY PROGRESS NOTE - NSBHFUPINTERVALHXFT_PSY_A_CORE
Chart reviewed and case discussed with treatment team. No events reported overnight. Sleep and appetite is fair. Patient reportedly has been intrusive with other peers,  has poor boundaries and kept pacing down female hallway despite redirection. He was given Haldol, Ativan and Benadryl PO PRN with good effect. Patient also reported that he was admitted because his brothers are trying to take "everything from me", possible paranoia. Patient is compliant with medications, no adverse effects reported.

## 2022-08-29 PROCEDURE — 99232 SBSQ HOSP IP/OBS MODERATE 35: CPT

## 2022-08-29 RX ORDER — LANOLIN ALCOHOL/MO/W.PET/CERES
5 CREAM (GRAM) TOPICAL AT BEDTIME
Refills: 0 | Status: DISCONTINUED | OUTPATIENT
Start: 2022-08-29 | End: 2022-09-01

## 2022-08-29 RX ADMIN — DIVALPROEX SODIUM 500 MILLIGRAM(S): 500 TABLET, DELAYED RELEASE ORAL at 08:39

## 2022-08-29 RX ADMIN — DIVALPROEX SODIUM 500 MILLIGRAM(S): 500 TABLET, DELAYED RELEASE ORAL at 20:05

## 2022-08-29 RX ADMIN — Medication 2 MILLIGRAM(S): at 20:05

## 2022-08-29 RX ADMIN — ARIPIPRAZOLE 20 MILLIGRAM(S): 15 TABLET ORAL at 08:42

## 2022-08-29 RX ADMIN — Medication 5 MILLIGRAM(S): at 20:05

## 2022-08-29 NOTE — BH SOCIAL WORK INITIAL PSYCHOSOCIAL EVALUATION - OTHER PAST PSYCHIATRIC HISTORY (INCLUDE DETAILS REGARDING ONSET, COURSE OF ILLNESS, INPATIENT/OUTPATIENT TREATMENT)
Pt is a 51 year old male,  from wife, from [place], recently lost job, with a hx of bipolar disorder diagnosis. [facility] clinicals pt has a hx of multiple psychiatric hospitalizations most recently being in 2014. Per clinicals pt has a hx of 2 past SA (one via overdose and another involved a bridge but was self aborted). Per clinicals pt has a hx of gambling. pt endorses a hx of occasional alcohol use. [facility] pt has recent verbal aggression and multiple recent legal issues including speeding citations. [facility] pt attends [facility] since 2014. [facility] pt has PMHx of multiple falls recently seen at [facility] on 8/7/22 needing suture on his head but signed out AMA refusing cardiac/telemetry work up. [facility] pt has a 15 year old daughter who lives with pt's ex-wife.       Lmsw and NP met with pt to discuss admission and to formulate tx plan. pt presents with neutral mood and constricted affect. pt reports he does not understand why he is admitted psychiatrically as he went to the hospital for fainting. Pt endorses being inconsistent with psychiatric medications. pt's thought process appeared disorganized at times. pt denies SI/HI/AH/VH. Pt endorses last manic episode was a few months ago.

## 2022-08-29 NOTE — BH PSYCHOLOGY - GROUP THERAPY NOTE - NSPSYCHOLGRPCOGPT_PSY_A_CORE FT
Pt attended Cognitive Behavioral Therapy Group. Acceptance and Commitment Therapy skills and concepts also utilized. The group started with a brief check-in during which patients shared one thing that energizes them. This activity generated insightful participation and pts responded well to prompt. Next, the group discussed a cartoon that portrayed the concept of cognitive defusion. Pt offered interpretation of the cartoon. Lastly, the group discussed strategies to stop overthinking. Group leaders explained concepts, reinforced participation, and engaged patients in discussion.

## 2022-08-29 NOTE — BH PSYCHOLOGY - GROUP THERAPY NOTE - NSPSYCHOLGRPCOGINT_PSY_A_CORE FT
Cognitive behavioral therapy, Acceptance and Commitment Therapy, emotion regulation/coping skills taught, psychoeducation

## 2022-08-29 NOTE — BH PSYCHOLOGY - GROUP THERAPY NOTE - NSBHPSYCHOLPARTICIPCOMMENT_PSY_A_CORE FT
Pt was engaged in group discussion, responded to others appropriately, and meaningfully contributed to group discussion. Thought processes linear, speech within normal limits, and oriented x3.

## 2022-08-29 NOTE — BH INPATIENT PSYCHIATRY PROGRESS NOTE - NSBHFUPINTERVALHXFT_PSY_A_CORE
Follow-up for bipolar 1 disorder. Chart reviewed and case discussed with treatment team. No events reported overnight. The pt. reports poor sleep due to the beds being uncomfortable; no changes to appetite. He is medication adherent, tolerating them well. Vitals are stable. He is not in physical distress. No episodes of syncope, light headedness or dizziness since transfer to unit. Pt. w/ loop recorder monitoring for arrhythmias. He denies chest pain, tightness or shortness of breath.   The patient appears tired, eyes are red and appear heavy. He reports he has been unable to sleep since at [facility]. With further clarification he also stated he was sleeping poorly prior to July 26th, when he first went to [facility] for syncope. The pt. stated at the time, his mood was "fine", reports "normal" energy. He did report gambling about 2 months ago, stating he would salcedo "sometimes a lot." Pt. reported last episode of depression was 9 yrs ago. He denied substance use. Denies psychotic Sx. He is discharge focused and difficult to redirect at times. The pt. repeatedly asked the writer to check his blood work today so he can leave tomorrow.

## 2022-08-29 NOTE — BH INPATIENT PSYCHIATRY PROGRESS NOTE - NSBHASSESSSUMMFT_PSY_ALL_CORE
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 Jeovanny Narvaez at [facility], hx of gambling, no substance use disorder, + recent verbally aggressive behavior. + recent multiple legal issues- speeding citations, brought in by family for LOC. 2 recent falls and seen at [facility] ED last on 8/7/22- suture to head and signed out AMA refusing cardiac/telemetry work up. Psych consulted for medication management. As patient was being followed by C/L team, appeared to be manic and not at his baseline per family. Patient transferred to [facility] for decompensation of bipolar disorder.    Upon assessment, pt. w/ manic Sx that including pressured speech, insomnia and tangentiality. Pt. denies paranoia or Hx of psychosis w/ sowmya but is suspected given Tx w. Abilify and dose of 20mg. VPA level tomorrow prior to titrating Depakote. Will continue Abilify at current dose. Melatonin for insomnia.   Repeat CBC w/ diff and CMP tomorrow morning for routine monitoring post hospitalization.       Plan:  1.	Legals: admit on 9.27 status.  2.	Safety: routine observation, denies SI/HI/I/P on the unit. Based on risk assessment evaluation, patient IS NOT at acute risk of harm to self or others at this time, DOES NOT require 1:1 CO.  3.	Psychiatry: Abilify 20 mg qd and Depakote  mg bid.  	VPA level 8/30  MEDICATIONS  (PRN):  acetaminophen     Tablet .. 650 milliGRAM(s) Oral every 6 hours PRN Mild Pain (1 - 3), Moderate Pain (4 - 6)  diphenhydrAMINE 50 milliGRAM(s) Oral every 4 hours PRN agitation / eps  diphenhydrAMINE Injectable 50 milliGRAM(s) IntraMuscular once PRN agitation / eps  haloperidol     Tablet 5 milliGRAM(s) Oral every 6 hours PRN agitation  haloperidol    Injectable 5 milliGRAM(s) IntraMuscular once PRN agitation  LORazepam     Tablet 2 milliGRAM(s) Oral every 6 hours PRN agitation  LORazepam   Injectable 2 milliGRAM(s) IntraMuscular once PRN agitation    4.	Group, milieu, individual therapy as appropriate.  5.	Medical: Pt. post transfer from [facility] for syncope. Loop recorder in place to monitor for arrythmia. Fall risk.  Pending repeat CBC w/ diff and CMP  6.	Dispo: pending clinical improvement.  Patient continues to require inpatient hospitalization for stabilization and safety.

## 2022-08-30 LAB
ALBUMIN SERPL ELPH-MCNC: 4.1 G/DL — SIGNIFICANT CHANGE UP (ref 3.3–5)
ALP SERPL-CCNC: 55 U/L — SIGNIFICANT CHANGE UP (ref 40–120)
ALT FLD-CCNC: 35 U/L — SIGNIFICANT CHANGE UP (ref 4–41)
ANION GAP SERPL CALC-SCNC: 8 MMOL/L — SIGNIFICANT CHANGE UP (ref 7–14)
AST SERPL-CCNC: 29 U/L — SIGNIFICANT CHANGE UP (ref 4–40)
BASOPHILS # BLD AUTO: 0.05 K/UL — SIGNIFICANT CHANGE UP (ref 0–0.2)
BASOPHILS NFR BLD AUTO: 0.6 % — SIGNIFICANT CHANGE UP (ref 0–2)
BILIRUB SERPL-MCNC: 0.3 MG/DL — SIGNIFICANT CHANGE UP (ref 0.2–1.2)
BUN SERPL-MCNC: 13 MG/DL — SIGNIFICANT CHANGE UP (ref 7–23)
CALCIUM SERPL-MCNC: 8.9 MG/DL — SIGNIFICANT CHANGE UP (ref 8.4–10.5)
CHLORIDE SERPL-SCNC: 106 MMOL/L — SIGNIFICANT CHANGE UP (ref 98–107)
CO2 SERPL-SCNC: 27 MMOL/L — SIGNIFICANT CHANGE UP (ref 22–31)
CREAT SERPL-MCNC: 0.95 MG/DL — SIGNIFICANT CHANGE UP (ref 0.5–1.3)
EGFR: 96 ML/MIN/1.73M2 — SIGNIFICANT CHANGE UP
EOSINOPHIL # BLD AUTO: 0.2 K/UL — SIGNIFICANT CHANGE UP (ref 0–0.5)
EOSINOPHIL NFR BLD AUTO: 2.5 % — SIGNIFICANT CHANGE UP (ref 0–6)
GLUCOSE SERPL-MCNC: 80 MG/DL — SIGNIFICANT CHANGE UP (ref 70–99)
HCT VFR BLD CALC: 43.2 % — SIGNIFICANT CHANGE UP (ref 39–50)
HGB BLD-MCNC: 14.2 G/DL — SIGNIFICANT CHANGE UP (ref 13–17)
IANC: 3.99 K/UL — SIGNIFICANT CHANGE UP (ref 1.8–7.4)
IMM GRANULOCYTES NFR BLD AUTO: 0.3 % — SIGNIFICANT CHANGE UP (ref 0–1.5)
LYMPHOCYTES # BLD AUTO: 3.01 K/UL — SIGNIFICANT CHANGE UP (ref 1–3.3)
LYMPHOCYTES # BLD AUTO: 38.3 % — SIGNIFICANT CHANGE UP (ref 13–44)
MCHC RBC-ENTMCNC: 32.9 GM/DL — SIGNIFICANT CHANGE UP (ref 32–36)
MCHC RBC-ENTMCNC: 33.6 PG — SIGNIFICANT CHANGE UP (ref 27–34)
MCV RBC AUTO: 102.1 FL — HIGH (ref 80–100)
MONOCYTES # BLD AUTO: 0.58 K/UL — SIGNIFICANT CHANGE UP (ref 0–0.9)
MONOCYTES NFR BLD AUTO: 7.4 % — SIGNIFICANT CHANGE UP (ref 2–14)
NEUTROPHILS # BLD AUTO: 3.99 K/UL — SIGNIFICANT CHANGE UP (ref 1.8–7.4)
NEUTROPHILS NFR BLD AUTO: 50.9 % — SIGNIFICANT CHANGE UP (ref 43–77)
NRBC # BLD: 0 /100 WBCS — SIGNIFICANT CHANGE UP (ref 0–0)
NRBC # FLD: 0 K/UL — SIGNIFICANT CHANGE UP (ref 0–0)
PLATELET # BLD AUTO: 200 K/UL — SIGNIFICANT CHANGE UP (ref 150–400)
POTASSIUM SERPL-MCNC: 4.2 MMOL/L — SIGNIFICANT CHANGE UP (ref 3.5–5.3)
POTASSIUM SERPL-SCNC: 4.2 MMOL/L — SIGNIFICANT CHANGE UP (ref 3.5–5.3)
PROT SERPL-MCNC: 6.4 G/DL — SIGNIFICANT CHANGE UP (ref 6–8.3)
RBC # BLD: 4.23 M/UL — SIGNIFICANT CHANGE UP (ref 4.2–5.8)
RBC # FLD: 12 % — SIGNIFICANT CHANGE UP (ref 10.3–14.5)
SODIUM SERPL-SCNC: 141 MMOL/L — SIGNIFICANT CHANGE UP (ref 135–145)
VALPROATE SERPL-MCNC: 47.8 UG/ML — LOW (ref 50–100)
WBC # BLD: 7.85 K/UL — SIGNIFICANT CHANGE UP (ref 3.8–10.5)
WBC # FLD AUTO: 7.85 K/UL — SIGNIFICANT CHANGE UP (ref 3.8–10.5)

## 2022-08-30 RX ADMIN — DIVALPROEX SODIUM 500 MILLIGRAM(S): 500 TABLET, DELAYED RELEASE ORAL at 09:16

## 2022-08-30 RX ADMIN — DIVALPROEX SODIUM 500 MILLIGRAM(S): 500 TABLET, DELAYED RELEASE ORAL at 20:09

## 2022-08-30 RX ADMIN — Medication 2 MILLIGRAM(S): at 20:09

## 2022-08-30 RX ADMIN — ARIPIPRAZOLE 20 MILLIGRAM(S): 15 TABLET ORAL at 09:16

## 2022-08-30 RX ADMIN — Medication 5 MILLIGRAM(S): at 20:10

## 2022-08-30 NOTE — BH INPATIENT PSYCHIATRY PROGRESS NOTE - NSBHFUPINTERVALHXFT_PSY_A_CORE
Chart reviewed and case discussed with treatment team. No events reported overnight. Continues to report poor sleep, attributing it to his bed being uncomfortable. Reports appetite and energy level unchanged. He is medication adherent, tolerating them well.     Denies physical distress or other ROS except as indicated above. No chest pain, shortness of breath, palpitations, lightheadness, syncope, or nausea reported (previous syncopal episodes prompting medical admissions). Pt. w/ loop recorder monitoring for arrhythmias. Vitals within normal limits.     Awake and alert though somnolent appearing-appearing similar to before. Reports he is sleeping well, though sleep has been inconsistent per staff report. Reports his mood to be "fine", reports "normal" energy, though interview notable for his pressured speech, tangentiality, disorganization, poor insight and judgment. Confirms significant gambling recently, claiming to have gambled $1 million recently, claiming this is not a problem, only acknowledging having borrowed money to [place] when presented with outside report, indicating more recently than previously reported though still vague regarding timing. Discharge-focused, reporting that he is well and doesn't need to be admitted to the hospital. He is discharge-focused and difficult to redirect. Insistent that he leave immediately, unable to accept explanation.  Chart reviewed and case discussed with treatment team. No events reported overnight. He is medication adherent, tolerating them well. Awake and alert though somnolent appearing-appearing similar to before. Continues pressured speech, intrusive with other patients, very active.    Denies physical distress or other ROS except as indicated above. No chest pain, shortness of breath, palpitations, lightheadness, syncope, or nausea reported (previous syncopal episodes prompting medical admissions). Pt. w/ loop recorder monitoring for arrhythmias. Vitals within normal limits.     Continues to report poor sleep, attributing it to his bed being uncomfortable. Reports appetite and energy level unchanged. Reports he is sleeping well, though sleep has been inconsistent per staff report. Reports his mood to be "fine", reports "normal" energy, though interview notable for his pressured speech, tangentiality, disorganization, poor insight and judgment. Confirms significant gambling recently, claiming to have gambled $1 million recently, claiming this is not a problem, only acknowledging having borrowed money to [place] when presented with outside report, indicating more recently than previously reported though still vague regarding timing. Discharge-focused, reporting that he is well and doesn't need to be admitted to the hospital. He is discharge-focused and difficult to redirect. Insistent that he leave immediately, unable to accept explanation.

## 2022-08-30 NOTE — BH INPATIENT PSYCHIATRY PROGRESS NOTE - NSBHASSESSSUMMFT_PSY_ALL_CORE
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 Jeovanny Narvaez at [facility], hx of gambling, no substance use disorder, + recent verbally aggressive behavior. + recent multiple legal issues- speeding citations, brought in by family for LOC. 2 recent falls and seen at [facility] ED last on 8/7/22- suture to head and signed out AMA refusing cardiac/telemetry work up. Psych consulted for medication management. As patient was being followed by C/L team, appeared to be manic and not at his baseline per family. Patient transferred to [facility] for decompensation of bipolar disorder.    Upon assessment, pt. w/ continued manic Sx that including pressured speech, insomnia and tangentiality. Pt. denies paranoia or Hx of psychosis w/ sowmya but evidence on exam. Tolerating Abilify 20mg and VPA, level remaining low. Continuing Abilify at current dose. Titrating VPA. Melatonin for insomnia.     8/30 labs: CBC and CMP wnl. VPA trough is low (47.8)      Plan:  1.	Legals: admit on 9.27 status.  2.	Safety: routine observation, denies SI/HI/I/P on the unit. Based on risk assessment evaluation, patient IS NOT at acute risk of harm to self or others at this time, DOES NOT require 1:1 CO.  3.	Psychiatry: Abilify 20 mg qd and Depakote  mg bid.  	VPA level 47.8 8/30  MEDICATIONS  (PRN):  acetaminophen     Tablet .. 650 milliGRAM(s) Oral every 6 hours PRN Mild Pain (1 - 3), Moderate Pain (4 - 6)  diphenhydrAMINE 50 milliGRAM(s) Oral every 4 hours PRN agitation / eps  diphenhydrAMINE Injectable 50 milliGRAM(s) IntraMuscular once PRN agitation / eps  haloperidol     Tablet 5 milliGRAM(s) Oral every 6 hours PRN agitation  haloperidol    Injectable 5 milliGRAM(s) IntraMuscular once PRN agitation  LORazepam     Tablet 2 milliGRAM(s) Oral every 6 hours PRN agitation  LORazepam   Injectable 2 milliGRAM(s) IntraMuscular once PRN agitation    4.	Group, milieu, individual therapy as appropriate.  5.	Medical: Pt. post transfer from [facility] for syncope. Loop recorder in place to monitor for arrythmia. Fall risk.  Pending repeat CBC w/ diff and CMP  6.	Dispo: pending clinical improvement.  Patient continues to require inpatient hospitalization for stabilization and safety.

## 2022-08-31 PROCEDURE — 99232 SBSQ HOSP IP/OBS MODERATE 35: CPT

## 2022-08-31 RX ORDER — ARIPIPRAZOLE 15 MG/1
882 TABLET ORAL
Qty: 1 | Refills: 0
Start: 2022-08-31 | End: 2022-09-29

## 2022-08-31 RX ORDER — DIVALPROEX SODIUM 500 MG/1
500 TABLET, DELAYED RELEASE ORAL DAILY
Refills: 0 | Status: DISCONTINUED | OUTPATIENT
Start: 2022-08-31 | End: 2022-09-01

## 2022-08-31 RX ORDER — ARIPIPRAZOLE 15 MG/1
400 TABLET ORAL ONCE
Refills: 0 | Status: COMPLETED | OUTPATIENT
Start: 2022-08-31 | End: 2022-08-31

## 2022-08-31 RX ORDER — DIVALPROEX SODIUM 500 MG/1
1000 TABLET, DELAYED RELEASE ORAL AT BEDTIME
Refills: 0 | Status: DISCONTINUED | OUTPATIENT
Start: 2022-08-31 | End: 2022-09-02

## 2022-08-31 RX ORDER — LIDOCAINE 4 G/100G
1 CREAM TOPICAL DAILY
Refills: 0 | Status: DISCONTINUED | OUTPATIENT
Start: 2022-08-31 | End: 2022-09-07

## 2022-08-31 RX ORDER — ARIPIPRAZOLE 15 MG/1
400 TABLET ORAL
Qty: 1 | Refills: 0
Start: 2022-08-31 | End: 2022-09-29

## 2022-08-31 RX ADMIN — DIVALPROEX SODIUM 500 MILLIGRAM(S): 500 TABLET, DELAYED RELEASE ORAL at 10:41

## 2022-08-31 RX ADMIN — DIVALPROEX SODIUM 1000 MILLIGRAM(S): 500 TABLET, DELAYED RELEASE ORAL at 20:02

## 2022-08-31 RX ADMIN — Medication 5 MILLIGRAM(S): at 20:02

## 2022-08-31 RX ADMIN — Medication 50 MILLIGRAM(S): at 20:02

## 2022-08-31 RX ADMIN — Medication 650 MILLIGRAM(S): at 11:19

## 2022-08-31 RX ADMIN — ARIPIPRAZOLE 400 MILLIGRAM(S): 15 TABLET ORAL at 17:43

## 2022-08-31 RX ADMIN — LIDOCAINE 1 PATCH: 4 CREAM TOPICAL at 11:34

## 2022-08-31 RX ADMIN — ARIPIPRAZOLE 20 MILLIGRAM(S): 15 TABLET ORAL at 08:29

## 2022-08-31 RX ADMIN — DIVALPROEX SODIUM 500 MILLIGRAM(S): 500 TABLET, DELAYED RELEASE ORAL at 08:29

## 2022-08-31 NOTE — BH INPATIENT PSYCHIATRY PROGRESS NOTE - NSBHFUPINTERVALHXFT_PSY_A_CORE
Chart reviewed and case discussed with treatment team. No events reported overnight. He is medication adherent, tolerating them well. Awake and alert though somnolent appearing-appearing similar to before. Continues pressured speech, intrusive with other patients and writer, AURELIA, tangential.     Denies physical distress or other ROS except as indicated above. No chest pain, shortness of breath, palpitations, lightheadedness, syncope, or nausea reported (previous syncopal episodes prompting medical admissions). Pt. w/ loop recorder monitoring for arrhythmias. Vitals within normal limits.     Pt. reports sleeping overnight, however, per staff sleep was impaired. Pt. continues to report his mood to be "fine", reports "normal" energy, though interview notable for his pressured speech, tangentiality, disorganization, poor insight and judgment. Mood lability also noted during interview as demonstrated, varying between irritable and euthymic. Pt. often interrupted the writer during interview and required frequent redirection. He remains discharge focused. Pt. agreeable to [facility].

## 2022-08-31 NOTE — BH DISCHARGE NOTE NURSING/SOCIAL WORK/PSYCH REHAB - NSDCPRRECOMMEND_PSY_ALL_CORE
Psych rehab staff recommend patient follow up with [facility] OPAL Appointment in person with Dr. Flores. on 08/12/2022

## 2022-08-31 NOTE — BH DISCHARGE NOTE NURSING/SOCIAL WORK/PSYCH REHAB - DISCHARGE INSTRUCTIONS AFTERCARE APPOINTMENTS
In order to check the location, date, or time of your aftercare appointment, please refer to your Discharge Instructions Document given to you upon leaving the hospital.  If you have lost the instructions please call [phone]

## 2022-08-31 NOTE — BH DISCHARGE NOTE NURSING/SOCIAL WORK/PSYCH REHAB - NSCDUDCCRISIS_PSY_A_CORE
Critical access hospital Well  1 [phone]) Critical access hospital-WELL ([phone])  Text "WELL" to [phone]  Website: www.[employer]/.[facility] 1 [phone] 471-XPLZ (4963) Website: www.safehorizon.org/.National Suicide Prevention Lifeline 0 (036) 131-2970/.  [facility]  1 (682) LIFENET (867-9526)/.  [facility]  [phone] [place] [address] [phone]   Hours:  Monday through Friday from 9 AM to 3 PM/.  [employer] - Veterans Crisis Line  [phone], Option 1

## 2022-08-31 NOTE — BH DISCHARGE NOTE NURSING/SOCIAL WORK/PSYCH REHAB - NSDCVIVACCINE_GEN_ALL_CORE_FT
Tdap; 06-Aug-2022 19:11; Chikis Barker (AYESHA); [employer]; [id number] (Exp. Date: 14-May-2024); IntraMuscular; Deltoid Left.; 0.5 milliLiter(s); VIS (VIS Published: 09-May-2013, VIS Presented: 06-Aug-2022);

## 2022-08-31 NOTE — BH DISCHARGE NOTE NURSING/SOCIAL WORK/PSYCH REHAB - PATIENT PORTAL LINK FT
You can access the FollowMyHealth Patient Portal offered by [facility] by registering at the following website: [email or web address]. By joining [facility]’s FollowMyHealth portal, you will also be able to view your health information using other applications (apps) compatible with our system.

## 2022-08-31 NOTE — BH INPATIENT PSYCHIATRY PROGRESS NOTE - NSBHASSESSSUMMFT_PSY_ALL_CORE
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 Jeovanny Narvaez at [facility], hx of gambling, no substance use disorder, + recent verbally aggressive behavior. + recent multiple legal issues- speeding citations, brought in by family for LOC. 2 recent falls and seen at [facility] ED last on 8/7/22- suture to head and signed out AMA refusing cardiac/telemetry work up. Psych consulted for medication management. As patient was being followed by C/L team, appeared to be manic and not at his baseline per family. Patient transferred to [facility] for decompensation of bipolar disorder.    Upon assessment, pt. w/ continued manic Sx including pressured speech, intrusiveness, insomnia and tangentiality. Pt. denies paranoia or Hx of psychosis w/ sowmya but evidence on exam. Tolerating Abilify 20mg and VPA, level remaining low. Continuing Abilify at current dose. Titrating VPA to 500mg AM and 1000mg HS. Pt. agreeable to [facility]; Maintena fully covered by insurance; will initiate today.   Treatment team recommending the patient remain hospitalized for titration Depakote and demonstrated therapeutic response.     8/30 labs: CBC and CMP wnl. VPA trough is low (47.8)      Plan:  1.	Legals: admit on 9.27 status.  2.	Safety: routine observation, denies SI/HI/I/P on the unit. Based on risk assessment evaluation, patient IS NOT at acute risk of harm to self or others at this time, DOES NOT require 1:1 CO.  3.	Psychiatry: Abilify 20 mg qd x14 days. Maintena 400mg 8/31  	Depakote DR 500AM and 1000mg HS  	VPA level 47.8 8/30  MEDICATIONS  (PRN):  acetaminophen     Tablet .. 650 milliGRAM(s) Oral every 6 hours PRN Mild Pain (1 - 3), Moderate Pain (4 - 6)  diphenhydrAMINE 50 milliGRAM(s) Oral every 4 hours PRN agitation / eps  diphenhydrAMINE Injectable 50 milliGRAM(s) IntraMuscular once PRN agitation / eps  haloperidol     Tablet 5 milliGRAM(s) Oral every 6 hours PRN agitation  haloperidol    Injectable 5 milliGRAM(s) IntraMuscular once PRN agitation  LORazepam     Tablet 2 milliGRAM(s) Oral every 6 hours PRN agitation  LORazepam   Injectable 2 milliGRAM(s) IntraMuscular once PRN agitation    4.	Group, milieu, individual therapy as appropriate.  5.	Medical: Pt. post transfer from [facility] for syncope. Loop recorder in place to monitor for arrythmia. Fall risk.    6.	Dispo: pending clinical improvement.  Patient continues to require inpatient hospitalization for stabilization and safety.

## 2022-09-01 PROCEDURE — 99232 SBSQ HOSP IP/OBS MODERATE 35: CPT

## 2022-09-01 RX ORDER — DIVALPROEX SODIUM 500 MG/1
750 TABLET, DELAYED RELEASE ORAL DAILY
Refills: 0 | Status: DISCONTINUED | OUTPATIENT
Start: 2022-09-01 | End: 2022-09-02

## 2022-09-01 RX ADMIN — Medication 2 MILLIGRAM(S): at 00:03

## 2022-09-01 RX ADMIN — Medication 2 MILLIGRAM(S): at 20:34

## 2022-09-01 RX ADMIN — LIDOCAINE 1 PATCH: 4 CREAM TOPICAL at 08:23

## 2022-09-01 RX ADMIN — Medication 650 MILLIGRAM(S): at 07:20

## 2022-09-01 RX ADMIN — Medication 650 MILLIGRAM(S): at 20:33

## 2022-09-01 RX ADMIN — DIVALPROEX SODIUM 500 MILLIGRAM(S): 500 TABLET, DELAYED RELEASE ORAL at 08:23

## 2022-09-01 RX ADMIN — ARIPIPRAZOLE 20 MILLIGRAM(S): 15 TABLET ORAL at 08:23

## 2022-09-01 RX ADMIN — DIVALPROEX SODIUM 1000 MILLIGRAM(S): 500 TABLET, DELAYED RELEASE ORAL at 20:33

## 2022-09-01 NOTE — BH TREATMENT PLAN - NSTXDCOPNOINTERSW_PSY_ALL_CORE
Lmsw will provide support, insight, discharge planning, psychoeducation, and will maintain contact with identified supports.

## 2022-09-01 NOTE — BH INPATIENT PSYCHIATRY PROGRESS NOTE - NSBHFUPINTERVALHXFT_PSY_A_CORE
Chart reviewed and case discussed with treatment team. No events reported overnight. He is medication adherent, tolerating them well. Awake and alert though somnolent appearing-appearing similar to before. Continues pressured speech, intrusive with other patients and writer, is perseverative, PMA, tangential.     Denies physical distress or other ROS except as indicated above. No chest pain, shortness of breath, palpitations, lightheadedness, syncope, or nausea reported (previous syncopal episodes prompting medical admissions). Pt. w/ loop recorder monitoring for arrhythmias. Vitals within normal limits.     Pt. continues to report poor sleep. He reports the bed is uncomfortable despite lidocaine patch. Pt. continues to report he is fine, he is "better" and is discharge focused. The writer addressed the concerning Sx with the patient, however he repeatedly interrupted the writer. He went on to describe his back pain, asking to leave to go to PT outpatient and return to the unit. The pt. was told a PT consult would be placed for him. Since then, nursing reported the pt. has repeatedly inquired about PT,  has been demanding and is irritable. The pt. was informed that he will remain hospitalized for titration of Depakote and to ensure he is able to sleep. The disagreed and accused the writer of trying to make money, despite educating him on his Sx and associated risks.  The pt. denied A/H, V/H, s/i/p, h/i/p and delusions.     Collateral from sister-in-law: she came to see him on Saturday. He didn’t want to see his brothers because he is accusing them of putting him in the hospital. He said he is fine and needs to leave.   Sunday he was irritable during visit. He was saying “I want to get out of here.” His mood would change in the middle of their conversations, from irritable to friendly. He was demanding to speak with his brothers on the phone. After calling one of them, he started to cry on the phone.    Yesterday, he told his brother he wants to leave, he wants to go to the club. Sister-in-law said prior to hospitalization he was gambling a lot and is afraid that he wants to leave and [place], based on his behavior now.

## 2022-09-01 NOTE — BH INPATIENT PSYCHIATRY PROGRESS NOTE - NSBHASSESSSUMMFT_PSY_ALL_CORE
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 Jeovanny Narvaez at [facility], hx of gambling, no substance use disorder, + recent verbally aggressive behavior. + recent multiple legal issues- speeding citations, brought in by family for LOC. 2 recent falls and seen at [facility] ED last on 8/7/22- suture to head and signed out AMA refusing cardiac/telemetry work up. Psych consulted for medication management. As patient was being followed by C/L team, appeared to be manic and not at his baseline per family. Patient transferred to [facility] for decompensation of bipolar disorder.    Upon assessment, pt. w/ continued manic Sx including PMA, pressured speech, intrusiveness, he is perseverative and tangential, has insomnia. Pt. denies paranoia or Hx of psychosis w/ sowmya but paranoia suspected as he questions staff's intentions. Abilify Maintena 400mg administered 8/30; continue PO supplementation x14 days. Titrate AM Depakote DR to 750mg. Start Ativan 2mg HS for sowmya/insomnia; monitor for sedation.   Pt. covered by insurance through next Wednesday.      Plan:  1.	Legals: admit on 9.27 status.  2.	Safety: routine observation, denies SI/HI/I/P on the unit. Based on risk assessment evaluation, patient IS NOT at acute risk of harm to self or others at this time, DOES NOT require 1:1 CO.  3.	Psychiatry: Abilify 20 mg qd x14 days. Maintena 400mg 8/31  	Depakote DR 750AM and 1000mg HS  	VPA level 47.8 8/30  	Ativan 2mg HS--sowmya/insomnia  MEDICATIONS  (PRN):  acetaminophen     Tablet .. 650 milliGRAM(s) Oral every 6 hours PRN Mild Pain (1 - 3), Moderate Pain (4 - 6)  diphenhydrAMINE 50 milliGRAM(s) Oral every 4 hours PRN agitation / eps  diphenhydrAMINE Injectable 50 milliGRAM(s) IntraMuscular once PRN agitation / eps  haloperidol     Tablet 5 milliGRAM(s) Oral every 6 hours PRN agitation  haloperidol    Injectable 5 milliGRAM(s) IntraMuscular once PRN agitation  LORazepam     Tablet 2 milliGRAM(s) Oral every 6 hours PRN agitation  LORazepam   Injectable 2 milliGRAM(s) IntraMuscular once PRN agitation    4.	Group, milieu, individual therapy as appropriate.  5.	Medical: Pt. post transfer from [facility] for syncope. Loop recorder in place to monitor for arrythmia. Fall risk.    6.	Dispo: pending clinical improvement.  Patient continues to require inpatient hospitalization for stabilization and safety.

## 2022-09-01 NOTE — BH TREATMENT PLAN - NSTXPATIENTPARTICIPATE_PSY_ALL_CORE
Patient participated in defining interventions/Patient participated in development of after care plan

## 2022-09-02 ENCOUNTER — APPOINTMENT (OUTPATIENT)
Dept: ELECTROPHYSIOLOGY | Facility: CLINIC | Age: 52
End: 2022-09-02

## 2022-09-02 ENCOUNTER — NON-APPOINTMENT (OUTPATIENT)
Age: 52
End: 2022-09-02

## 2022-09-02 PROCEDURE — 99232 SBSQ HOSP IP/OBS MODERATE 35: CPT

## 2022-09-02 RX ORDER — IBUPROFEN 200 MG
600 TABLET ORAL EVERY 8 HOURS
Refills: 0 | Status: DISCONTINUED | OUTPATIENT
Start: 2022-09-02 | End: 2022-09-07

## 2022-09-02 RX ORDER — VALPROIC ACID (AS SODIUM SALT) 250 MG/5ML
1000 SOLUTION, ORAL ORAL AT BEDTIME
Refills: 0 | Status: DISCONTINUED | OUTPATIENT
Start: 2022-09-02 | End: 2022-09-07

## 2022-09-02 RX ORDER — CLONAZEPAM 1 MG
1 TABLET ORAL AT BEDTIME
Refills: 0 | Status: DISCONTINUED | OUTPATIENT
Start: 2022-09-02 | End: 2022-09-07

## 2022-09-02 RX ORDER — VALPROIC ACID (AS SODIUM SALT) 250 MG/5ML
750 SOLUTION, ORAL ORAL DAILY
Refills: 0 | Status: DISCONTINUED | OUTPATIENT
Start: 2022-09-02 | End: 2022-09-06

## 2022-09-02 RX ADMIN — Medication 600 MILLIGRAM(S): at 12:16

## 2022-09-02 RX ADMIN — LIDOCAINE 1 PATCH: 4 CREAM TOPICAL at 09:42

## 2022-09-02 RX ADMIN — Medication 650 MILLIGRAM(S): at 20:27

## 2022-09-02 RX ADMIN — Medication 1 MILLIGRAM(S): at 20:26

## 2022-09-02 RX ADMIN — DIVALPROEX SODIUM 750 MILLIGRAM(S): 500 TABLET, DELAYED RELEASE ORAL at 08:12

## 2022-09-02 RX ADMIN — ARIPIPRAZOLE 20 MILLIGRAM(S): 15 TABLET ORAL at 08:12

## 2022-09-02 RX ADMIN — Medication 1000 MILLIGRAM(S): at 20:27

## 2022-09-02 NOTE — BH INPATIENT PSYCHIATRY PROGRESS NOTE - NSBHASSESSSUMMFT_PSY_ALL_CORE
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 Jeovanny Narvaez at [facility], hx of gambling, no substance use disorder, + recent verbally aggressive behavior. + recent multiple legal issues- speeding citations, brought in by family for LOC. 2 recent falls and seen at [facility] ED last on 8/7/22- suture to head and signed out AMA refusing cardiac/telemetry work up. Psych consulted for medication management. As patient was being followed by C/L team, appeared to be manic and not at his baseline per family. Patient transferred to [facility] for decompensation of bipolar disorder.    Upon assessment, pt. w/ continued manic Sx including PMA, pressured speech, intrusiveness, he is perseverative and tangential, has insomnia. Patient has no insight into current Sx. Pt. denies paranoia or Hx of psychosis w/ sowmya but paranoia suspected as he questions staff's intentions. Abilify Maintena 400mg administered 8/30; continue PO supplementation x14 days. Switch Depakote DR to Depakene for possible cheeking as evidenced by lack of Sx remission. Switch Ativan to Klonopin ODT. Monitor for sedation. VPA level 9/6.  Pt. covered by insurance through next Wednesday.      Plan:  1.	Legals: admit on 9.27 status.  2.	Safety: routine observation, denies SI/HI/I/P on the unit. Based on risk assessment evaluation, patient IS NOT at acute risk of harm to self or others at this time, DOES NOT require 1:1 CO.  3.	Psychiatry: Abilify 20 mg qd x14 days. Maintena 400mg 8/31  	Depakene 750AM and 1000mg HS  	VPA level 47.8 8/30  	Klonopin 1mg HS--sowmya/insomnia  MEDICATIONS  (PRN):  acetaminophen     Tablet .. 650 milliGRAM(s) Oral every 6 hours PRN Mild Pain (1 - 3), Moderate Pain (4 - 6)  diphenhydrAMINE 50 milliGRAM(s) Oral every 4 hours PRN agitation / eps  diphenhydrAMINE Injectable 50 milliGRAM(s) IntraMuscular once PRN agitation / eps  haloperidol     Tablet 5 milliGRAM(s) Oral every 6 hours PRN agitation  haloperidol    Injectable 5 milliGRAM(s) IntraMuscular once PRN agitation  LORazepam     Tablet 2 milliGRAM(s) Oral every 6 hours PRN agitation  LORazepam   Injectable 2 milliGRAM(s) IntraMuscular once PRN agitation    4.	Group, milieu, individual therapy as appropriate.  5.	Medical: Pt. post transfer from [facility] for syncope. Loop recorder in place to monitor for arrythmia. Fall risk.    6.	Dispo: pending clinical improvement.  Patient continues to require inpatient hospitalization for stabilization and safety.

## 2022-09-02 NOTE — PHYSICAL THERAPY INITIAL EVALUATION ADULT - PLANNED THERAPY INTERVENTIONS, PT EVAL
joint mobilization/lumbar stabilization/manual therapy techniques/neuromuscular re-education/postural re-education/strengthening/stretching

## 2022-09-02 NOTE — PHYSICAL THERAPY INITIAL EVALUATION ADULT - ACTIVE RANGE OF MOTION EXAMINATION, REHAB EVAL
Lumbar ROM/bilateral  lower extremity Active ROM was WFL (within functional limits)/deficits as listed below

## 2022-09-02 NOTE — PHYSICAL THERAPY INITIAL EVALUATION ADULT - IMPAIRMENTS FOUND, PT EVAL
ergonomics and body mechanics/gait, locomotion, and balance/muscle strength/poor safety awareness/posture

## 2022-09-02 NOTE — PHYSICAL THERAPY INITIAL EVALUATION ADULT - PERTINENT HX OF CURRENT PROBLEM, REHAB EVAL
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 Jeovanny Narvaez at [facility], brought in by family for LOC. Psych consulted for medication management. As patient was being followed by C/L team, appeared to be manic and not at his baseline per family. Patient transferred to [facility] for decompensation of bipolar disorder. Patient referred to PT secondary to chronic back pain

## 2022-09-02 NOTE — BH INPATIENT PSYCHIATRY PROGRESS NOTE - NSBHFUPINTERVALHXFT_PSY_A_CORE
Chart reviewed and case discussed with treatment team. No events reported overnight. He is medication adherent, tolerating them well. Awake and alert though somnolent appearing-appearing similar to before. Continues pressured speech, intrusive with other patients and writer, is perseverative, PMA, tangential.     Denies physical distress or other ROS except as indicated above. No chest pain, shortness of breath, palpitations, lightheadedness, syncope, or nausea reported (previous syncopal episodes prompting medical admissions). Pt. w/ loop recorder monitoring for arrhythmias. Vitals within normal limits.     Per nursing, the pt. stated "I'm going to give trouble" if he does not have PT today and isn't discharged. Pt. continues to report poor sleep. He is pacing, rarely seen sitting down. He reports the bed is uncomfortable despite lidocaine patch. Pt. continues to report he is fine, he is "better" and is discharge focused. The writer addressed the concerning Sx with the patient, however he repeatedly interrupted the writer. The SW also attempted to reason with the patient and he remained oppositional. The writer mentioned his family's concerns regarding his behavior. The pt. stated his family is 50 miles away, "what do they know?" The writer reminded him that he drove to their house at midnight, unannounced. He stated "what's wrong with visiting my brother? There's nothing wrong with that." The pt. was unable to demonstrate insight regarding any concerns voiced by his family.     Today, his sister-in-law stated that his brother believes he presents similarly to admission, w/ little change. They are concerned with the patient's pre-occupation with a check coming in, as they believe he is planning to [place].

## 2022-09-03 PROCEDURE — 99232 SBSQ HOSP IP/OBS MODERATE 35: CPT

## 2022-09-03 RX ORDER — BENZOCAINE AND MENTHOL 5; 1 G/100ML; G/100ML
1 LIQUID ORAL EVERY 4 HOURS
Refills: 0 | Status: DISCONTINUED | OUTPATIENT
Start: 2022-09-03 | End: 2022-09-07

## 2022-09-03 RX ADMIN — Medication 1000 MILLIGRAM(S): at 20:06

## 2022-09-03 RX ADMIN — Medication 600 MILLIGRAM(S): at 20:06

## 2022-09-03 RX ADMIN — LIDOCAINE 1 PATCH: 4 CREAM TOPICAL at 09:07

## 2022-09-03 RX ADMIN — ARIPIPRAZOLE 20 MILLIGRAM(S): 15 TABLET ORAL at 09:03

## 2022-09-03 RX ADMIN — BENZOCAINE AND MENTHOL 1 LOZENGE: 5; 1 LIQUID ORAL at 13:28

## 2022-09-03 RX ADMIN — Medication 1 MILLIGRAM(S): at 20:06

## 2022-09-03 RX ADMIN — Medication 750 MILLIGRAM(S): at 09:03

## 2022-09-03 RX ADMIN — BENZOCAINE AND MENTHOL 1 LOZENGE: 5; 1 LIQUID ORAL at 18:34

## 2022-09-03 RX ADMIN — Medication 2 MILLIGRAM(S): at 09:04

## 2022-09-03 RX ADMIN — Medication 50 MILLIGRAM(S): at 20:07
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
## 2022-09-03 NOTE — BH INPATIENT PSYCHIATRY PROGRESS NOTE - NSBHASSESSSUMMFT_PSY_ALL_CORE
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 Jeovanny Narvaez at [facility], hx of gambling, no substance use disorder, + recent verbally aggressive behavior. + recent multiple legal issues- speeding citations, brought in by family for LOC. 2 recent falls and seen at [facility] ED last on 8/7/22- suture to head and signed out AMA refusing cardiac/telemetry work up. Psych consulted for medication management. As patient was being followed by C/L team, appeared to be manic and not at his baseline per family. Patient transferred to [facility] for decompensation of bipolar disorder.    Upon assessment, pt. w/ continued manic Sx including PMA, pressured speech, intrusiveness, he is perseverative and tangential, has insomnia. Patient has no insight into current Sx. Pt. denies paranoia or Hx of psychosis w/ sowmya but paranoia suspected as he questions staff's intentions. Abilify Maintena 400mg administered 8/30; continue PO supplementation x14 days. Switch Depakote DR to Depakene for possible cheeking as evidenced by lack of Sx remission. Switch Ativan to Klonopin ODT. Monitor for sedation. VPA level 9/6.  Pt. covered by insurance through next Wednesday.      Plan:  1.	Legals: admit on 9.27 status.  2.	Safety: routine observation, denies SI/HI/I/P on the unit. Based on risk assessment evaluation, patient IS NOT at acute risk of harm to self or others at this time, DOES NOT require 1:1 CO.  3.	Psychiatry: Abilify 20 mg qd x14 days. Maintena 400mg 8/31  	Depakene 750AM and 1000mg HS  	VPA level 47.8 8/30  	Klonopin 1mg HS--sowmya/insomnia  MEDICATIONS  (PRN):  acetaminophen     Tablet .. 650 milliGRAM(s) Oral every 6 hours PRN Mild Pain (1 - 3), Moderate Pain (4 - 6)  diphenhydrAMINE 50 milliGRAM(s) Oral every 4 hours PRN agitation / eps  diphenhydrAMINE Injectable 50 milliGRAM(s) IntraMuscular once PRN agitation / eps  haloperidol     Tablet 5 milliGRAM(s) Oral every 6 hours PRN agitation  haloperidol    Injectable 5 milliGRAM(s) IntraMuscular once PRN agitation  LORazepam     Tablet 2 milliGRAM(s) Oral every 6 hours PRN agitation  LORazepam   Injectable 2 milliGRAM(s) IntraMuscular once PRN agitation    4.	Group, milieu, individual therapy as appropriate.  5.	Medical: Pt. post transfer from [facility] for syncope. Loop recorder in place to monitor for arrythmia. Fall risk.    6.	Dispo: pending clinical improvement.  Patient continues to require inpatient hospitalization for stabilization and safety.

## 2022-09-04 RX ADMIN — Medication 750 MILLIGRAM(S): at 08:24

## 2022-09-04 RX ADMIN — LIDOCAINE 1 PATCH: 4 CREAM TOPICAL at 08:23

## 2022-09-04 RX ADMIN — BENZOCAINE AND MENTHOL 1 LOZENGE: 5; 1 LIQUID ORAL at 08:24

## 2022-09-04 RX ADMIN — Medication 50 MILLIGRAM(S): at 20:13

## 2022-09-04 RX ADMIN — Medication 2 MILLIGRAM(S): at 08:24

## 2022-09-04 RX ADMIN — Medication 1000 MILLIGRAM(S): at 20:13

## 2022-09-04 RX ADMIN — HALOPERIDOL DECANOATE 5 MILLIGRAM(S): 100 INJECTION INTRAMUSCULAR at 20:13

## 2022-09-04 RX ADMIN — BENZOCAINE AND MENTHOL 1 LOZENGE: 5; 1 LIQUID ORAL at 15:49

## 2022-09-04 RX ADMIN — Medication 650 MILLIGRAM(S): at 10:05

## 2022-09-04 RX ADMIN — ARIPIPRAZOLE 20 MILLIGRAM(S): 15 TABLET ORAL at 08:25

## 2022-09-04 RX ADMIN — Medication 1 MILLIGRAM(S): at 20:13

## 2022-09-05 RX ADMIN — Medication 1 MILLIGRAM(S): at 20:53

## 2022-09-05 RX ADMIN — ARIPIPRAZOLE 20 MILLIGRAM(S): 15 TABLET ORAL at 08:43

## 2022-09-05 RX ADMIN — Medication 750 MILLIGRAM(S): at 08:43

## 2022-09-05 RX ADMIN — Medication 650 MILLIGRAM(S): at 14:28

## 2022-09-05 RX ADMIN — LIDOCAINE 1 PATCH: 4 CREAM TOPICAL at 08:43

## 2022-09-05 RX ADMIN — BENZOCAINE AND MENTHOL 1 LOZENGE: 5; 1 LIQUID ORAL at 08:46

## 2022-09-05 RX ADMIN — Medication 650 MILLIGRAM(S): at 14:58

## 2022-09-05 RX ADMIN — Medication 1000 MILLIGRAM(S): at 20:53

## 2022-09-05 NOTE — BH CHART NOTE - NSEVENTNOTEFT_PSY_ALL_CORE
JIMMIE called for evaluation of R foot pain. Pt reports "rolling ankle" this afternoon while trying to kick soccer ball. Reports non radiating generalized pain in R foot and some difficulty weight bearing. Pt states "I don't think it's broken, I think it's okay". Otherwise, no other associated symptoms reported. Pt denies chest pain, SOB, or edema. Denies headache, visual changes, confusion, or n/v.    VS: /81 sitting 97/75standing, HR 62 sitting 76stadning   Gen: Patient sitting in day room eating, NAD  Ext: R ankle: ROM intact, mild erythema for dorsal surface, no swelling, generalized TTP, no pinpoint tenders, no tenderness at navicular bone or base of 5th metatarsal  Gait: slight limp that improves with time  Neuro: awake, alert, grossly oriented.    A/P: JIMMIE called for evaluation of R foot pain 2/2 to mechanical trip over soccer ball. Pt clinically stable with exam notable for generalized R foot pain, mild erythema of dorsal foot and limp on initiation of walking that improves with continued strides. At this time, there is no clinical indication for xray per [place] rules .    1. no further medical intervention necessary at this time.  2. will continue to monitor routinely.  3. d/w RN staff JIMMIE called for evaluation of R foot pain. Pt reports "rolling ankle" this afternoon while trying to kick soccer ball. Reports non radiating generalized pain in R foot and some difficulty weight bearing. Pt states "I don't think it's broken, I think it's okay". Otherwise, no other associated symptoms reported. Pt denies chest pain, SOB, or edema. Denies headache, visual changes, confusion, or n/v.    VS: /81 sitting 97/75standing, HR 62 sitting 76stadning   Gen: Patient sitting in day room eating, NAD  Ext: R ankle: ROM intact, mild erythema for dorsal surface, no swelling, generalized TTP, no pinpoint tenders, no tenderness at navicular bone or base of 5th metatarsal  Gait: slight limp that improves with time  Neuro: awake, alert, grossly oriented.    A/P: JIMMIE called for evaluation of R foot pain 2/2 to mechanical trip over soccer ball. Pt clinically stable with exam notable for generalized R foot pain, mild erythema of dorsal foot and limp on initiation of walking that improves with continued strides. At this time, there is no clinical indication for xray per [place] rules. Symptoms can be managed supportively with ice and pain control prn.   1. no further medical intervention necessary at this time.  2. will continue to monitor routinely.  3. d/w RN staff

## 2022-09-06 LAB
ALBUMIN SERPL ELPH-MCNC: 4.1 G/DL — SIGNIFICANT CHANGE UP (ref 3.3–5)
ALP SERPL-CCNC: 57 U/L — SIGNIFICANT CHANGE UP (ref 40–120)
ALT FLD-CCNC: 33 U/L — SIGNIFICANT CHANGE UP (ref 4–41)
ANION GAP SERPL CALC-SCNC: 9 MMOL/L — SIGNIFICANT CHANGE UP (ref 7–14)
AST SERPL-CCNC: 22 U/L — SIGNIFICANT CHANGE UP (ref 4–40)
BASOPHILS # BLD AUTO: 0.07 K/UL — SIGNIFICANT CHANGE UP (ref 0–0.2)
BASOPHILS NFR BLD AUTO: 0.7 % — SIGNIFICANT CHANGE UP (ref 0–2)
BILIRUB SERPL-MCNC: 0.2 MG/DL — SIGNIFICANT CHANGE UP (ref 0.2–1.2)
BUN SERPL-MCNC: 16 MG/DL — SIGNIFICANT CHANGE UP (ref 7–23)
CALCIUM SERPL-MCNC: 9.1 MG/DL — SIGNIFICANT CHANGE UP (ref 8.4–10.5)
CHLORIDE SERPL-SCNC: 105 MMOL/L — SIGNIFICANT CHANGE UP (ref 98–107)
CO2 SERPL-SCNC: 28 MMOL/L — SIGNIFICANT CHANGE UP (ref 22–31)
CREAT SERPL-MCNC: 0.9 MG/DL — SIGNIFICANT CHANGE UP (ref 0.5–1.3)
EGFR: 102 ML/MIN/1.73M2 — SIGNIFICANT CHANGE UP
EOSINOPHIL # BLD AUTO: 0.63 K/UL — HIGH (ref 0–0.5)
EOSINOPHIL NFR BLD AUTO: 6.3 % — HIGH (ref 0–6)
GLUCOSE SERPL-MCNC: 118 MG/DL — HIGH (ref 70–99)
HCT VFR BLD CALC: 43.7 % — SIGNIFICANT CHANGE UP (ref 39–50)
HGB BLD-MCNC: 14 G/DL — SIGNIFICANT CHANGE UP (ref 13–17)
IANC: 4.99 K/UL — SIGNIFICANT CHANGE UP (ref 1.8–7.4)
IMM GRANULOCYTES NFR BLD AUTO: 0.5 % — SIGNIFICANT CHANGE UP (ref 0–1.5)
LYMPHOCYTES # BLD AUTO: 3.43 K/UL — HIGH (ref 1–3.3)
LYMPHOCYTES # BLD AUTO: 34.4 % — SIGNIFICANT CHANGE UP (ref 13–44)
MCHC RBC-ENTMCNC: 32 GM/DL — SIGNIFICANT CHANGE UP (ref 32–36)
MCHC RBC-ENTMCNC: 32.5 PG — SIGNIFICANT CHANGE UP (ref 27–34)
MCV RBC AUTO: 101.4 FL — HIGH (ref 80–100)
MONOCYTES # BLD AUTO: 0.8 K/UL — SIGNIFICANT CHANGE UP (ref 0–0.9)
MONOCYTES NFR BLD AUTO: 8 % — SIGNIFICANT CHANGE UP (ref 2–14)
NEUTROPHILS # BLD AUTO: 4.99 K/UL — SIGNIFICANT CHANGE UP (ref 1.8–7.4)
NEUTROPHILS NFR BLD AUTO: 50.1 % — SIGNIFICANT CHANGE UP (ref 43–77)
NRBC # BLD: 0 /100 WBCS — SIGNIFICANT CHANGE UP (ref 0–0)
NRBC # FLD: 0 K/UL — SIGNIFICANT CHANGE UP (ref 0–0)
PLATELET # BLD AUTO: 173 K/UL — SIGNIFICANT CHANGE UP (ref 150–400)
POTASSIUM SERPL-MCNC: 4.1 MMOL/L — SIGNIFICANT CHANGE UP (ref 3.5–5.3)
POTASSIUM SERPL-SCNC: 4.1 MMOL/L — SIGNIFICANT CHANGE UP (ref 3.5–5.3)
PROT SERPL-MCNC: 6.2 G/DL — SIGNIFICANT CHANGE UP (ref 6–8.3)
RBC # BLD: 4.31 M/UL — SIGNIFICANT CHANGE UP (ref 4.2–5.8)
RBC # FLD: 12.2 % — SIGNIFICANT CHANGE UP (ref 10.3–14.5)
SODIUM SERPL-SCNC: 142 MMOL/L — SIGNIFICANT CHANGE UP (ref 135–145)
VALPROATE SERPL-MCNC: 68.9 UG/ML — SIGNIFICANT CHANGE UP (ref 50–100)
WBC # BLD: 9.97 K/UL — SIGNIFICANT CHANGE UP (ref 3.8–10.5)
WBC # FLD AUTO: 9.97 K/UL — SIGNIFICANT CHANGE UP (ref 3.8–10.5)

## 2022-09-06 PROCEDURE — 99232 SBSQ HOSP IP/OBS MODERATE 35: CPT

## 2022-09-06 PROCEDURE — 73630 X-RAY EXAM OF FOOT: CPT | Mod: 26,RT

## 2022-09-06 RX ORDER — IBUPROFEN 200 MG
1 TABLET ORAL
Qty: 15 | Refills: 0
Start: 2022-09-06 | End: 2022-09-10

## 2022-09-06 RX ORDER — DIVALPROEX SODIUM 500 MG/1
3 TABLET, DELAYED RELEASE ORAL
Qty: 90 | Refills: 0
Start: 2022-09-06 | End: 2022-10-05

## 2022-09-06 RX ORDER — DIVALPROEX SODIUM 500 MG/1
1 TABLET, DELAYED RELEASE ORAL
Qty: 30 | Refills: 0
Start: 2022-09-06 | End: 2022-10-05

## 2022-09-06 RX ORDER — ARIPIPRAZOLE 15 MG/1
1 TABLET ORAL
Qty: 6 | Refills: 0
Start: 2022-09-06 | End: 2022-09-11

## 2022-09-06 RX ORDER — CLONAZEPAM 1 MG
1 TABLET ORAL
Qty: 7 | Refills: 0
Start: 2022-09-06 | End: 2022-09-12

## 2022-09-06 RX ADMIN — Medication 100 MILLIGRAM(S): at 18:03

## 2022-09-06 RX ADMIN — ARIPIPRAZOLE 20 MILLIGRAM(S): 15 TABLET ORAL at 08:31

## 2022-09-06 RX ADMIN — BENZOCAINE AND MENTHOL 1 LOZENGE: 5; 1 LIQUID ORAL at 06:12

## 2022-09-06 RX ADMIN — BENZOCAINE AND MENTHOL 1 LOZENGE: 5; 1 LIQUID ORAL at 11:07

## 2022-09-06 RX ADMIN — BENZOCAINE AND MENTHOL 1 LOZENGE: 5; 1 LIQUID ORAL at 15:35

## 2022-09-06 RX ADMIN — LIDOCAINE 1 PATCH: 4 CREAM TOPICAL at 08:30

## 2022-09-06 RX ADMIN — BENZOCAINE AND MENTHOL 1 LOZENGE: 5; 1 LIQUID ORAL at 20:19

## 2022-09-06 RX ADMIN — Medication 1000 MILLIGRAM(S): at 20:15

## 2022-09-06 RX ADMIN — Medication 1 MILLIGRAM(S): at 20:14

## 2022-09-06 RX ADMIN — Medication 750 MILLIGRAM(S): at 08:31

## 2022-09-06 NOTE — BH INPATIENT PSYCHIATRY PROGRESS NOTE - NSBHASSESSSUMMFT_PSY_ALL_CORE
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 Jeovanny Narvaez at [facility], hx of gambling, no substance use disorder, + recent verbally aggressive behavior. + recent multiple legal issues- speeding citations, brought in by family for LOC. 2 recent falls and seen at [facility] ED last on 8/7/22- suture to head and signed out AMA refusing cardiac/telemetry work up. Psych consulted for medication management. As patient was being followed by C/L team, appeared to be manic and not at his baseline per family. Patient transferred to [facility] for decompensation of bipolar disorder.    Upon assessment, pt. remains irritable, PMA, perseverative and w/ poor insight. Sleep remains fragmented; he attributes this to the mattress being uncomfortable. Speech is no longer pressured. Pt. is hypomanic, however, is only allowed insurance coverage through 9/7 and is not an acute risk of harm to himself or others at this time.   Abilify Maintena 400mg administered 8/30; continue PO supplementation x14 days. VPA level is 68.9. Switch Depakene to Depakote ER 1750mg for maintenance. Discharge tomorrow.        Plan:  1.	Legals: admit on 9.27 status.  2.	Safety: routine observation, denies SI/HI/I/P on the unit. Based on risk assessment evaluation, patient IS NOT at acute risk of harm to self or others at this time, DOES NOT require 1:1 CO.  3.	Psychiatry: Abilify 20 mg qd x14 days (stop . Maintena 400mg 8/31  	Depakene 750AM and 1000mg HS  	VPA level 47.8 8/30  	Klonopin 1mg HS--sowmya/insomnia  MEDICATIONS  (PRN):  acetaminophen     Tablet .. 650 milliGRAM(s) Oral every 6 hours PRN Mild Pain (1 - 3), Moderate Pain (4 - 6)  diphenhydrAMINE 50 milliGRAM(s) Oral every 4 hours PRN agitation / eps  diphenhydrAMINE Injectable 50 milliGRAM(s) IntraMuscular once PRN agitation / eps  haloperidol     Tablet 5 milliGRAM(s) Oral every 6 hours PRN agitation  haloperidol    Injectable 5 milliGRAM(s) IntraMuscular once PRN agitation  LORazepam     Tablet 2 milliGRAM(s) Oral every 6 hours PRN agitation  LORazepam   Injectable 2 milliGRAM(s) IntraMuscular once PRN agitation    4.	Group, milieu, individual therapy as appropriate.  5.	Medical: Pt. post transfer from [facility] for syncope. Loop recorder in place to monitor for arrythmia. Fall risk.    6.	Dispo: pending clinical improvement.  Patient continues to require inpatient hospitalization for stabilization and safety.   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 Jeovanny Narvaez at [facility], hx of gambling, no substance use disorder, + recent verbally aggressive behavior. + recent multiple legal issues- speeding citations, brought in by family for LOC. 2 recent falls and seen at [facility] ED last on 8/7/22- suture to head and signed out AMA refusing cardiac/telemetry work up. Psych consulted for medication management. As patient was being followed by C/L team, appeared to be manic and not at his baseline per family. Patient transferred to [facility] for decompensation of bipolar disorder.    Upon assessment, pt. remains irritable, PMA, perseverative and w/ poor insight. Sleep remains fragmented; he attributes this to the mattress being uncomfortable. Speech is no longer pressured. Pt. is hypomanic, however, is only allowed insurance coverage through 9/7 and is not an acute risk of harm to himself or others at this time. Discharge tomorrow.    Abilify Maintena 400mg administered 8/31; continue PO supplementation x14 days. VPA level is 68.90. Switch Depakene to Depakote ER 1750mg for maintenance. Continue Klonopin outpatient x2 weeks (1mg x 7 days, 0.5mg x7 days)      Plan:  1.	Legals: admit on 9.27 status.  2.	Safety: routine observation, denies SI/HI/I/P on the unit. Based on risk assessment evaluation, patient IS NOT at acute risk of harm to self or others at this time, DOES NOT require 1:1 CO.  3.	Psychiatry: Abilify 20 mg qd x14 days (stop 9/12). Abilify Maintena 400mg on 8/31  	Depakene 750AM and 1000mg HS--last on 9/6  	Depakote ER 1750mg 9/7  	VPA level on 9/6: 68.90  	Klonopin 1mg HS--sowmya/insomnia   MEDICATIONS  (PRN):  acetaminophen     Tablet .. 650 milliGRAM(s) Oral every 6 hours PRN Mild Pain (1 - 3), Moderate Pain (4 - 6)  diphenhydrAMINE 50 milliGRAM(s) Oral every 4 hours PRN agitation / eps  diphenhydrAMINE Injectable 50 milliGRAM(s) IntraMuscular once PRN agitation / eps  haloperidol     Tablet 5 milliGRAM(s) Oral every 6 hours PRN agitation  haloperidol    Injectable 5 milliGRAM(s) IntraMuscular once PRN agitation  LORazepam     Tablet 2 milliGRAM(s) Oral every 6 hours PRN agitation  LORazepam   Injectable 2 milliGRAM(s) IntraMuscular once PRN agitation    4.	Group, milieu, individual therapy as appropriate.  5.	Medical: Pt. post transfer from [facility] for syncope. Loop recorder in place to monitor for arrythmia. Fall risk.    6.	Dispo: pending clinical improvement.  Patient continues to require inpatient hospitalization for stabilization and safety.

## 2022-09-06 NOTE — BH INPATIENT PSYCHIATRY PROGRESS NOTE - NSBHFUPINTERVALHXFT_PSY_A_CORE
Chart reviewed and discussed and with team. Yesterday, the pt. was soccer and hitting the ball with force. Afterwards, he c/o right foot pain. JIMMIE called to assess. Redness noted, but pt. reported no pain at the time. Pt. later c/o pain overnight. Upon assessment today, right foot w/ +2 pitting edema to dorsal aspect. Pain reported pain to touch, also with slight limp. Erythema not observed. Xray of right foot ordered. Pt. also continues to c/o back pain, stated PT "did nothing" for him.   Vitals are stable. He is medication adherent, tolerating them well. VPA level is 68.9. CMP WNL. CBC w/ chronically elevated MCV; elevated lymphocytes and eosinophils noted. No acute concerns.   Pt. remains perseverative about discharge. Reports he is not sleeping well due to mattress. He reports his mood is "fine."  The pt. denied A/H, V/H, s/i/p, h/i/p and delusions.

## 2022-09-07 VITALS — TEMPERATURE: 98 F

## 2022-09-07 PROCEDURE — 99239 HOSP IP/OBS DSCHRG MGMT >30: CPT

## 2022-09-07 RX ADMIN — ARIPIPRAZOLE 20 MILLIGRAM(S): 15 TABLET ORAL at 08:39

## 2022-09-07 RX ADMIN — Medication 100 MILLIGRAM(S): at 08:40

## 2022-09-07 RX ADMIN — Medication 2 MILLIGRAM(S): at 00:19

## 2022-09-07 NOTE — BH INPATIENT PSYCHIATRY PROGRESS NOTE - NSTXMEDICGOAL_PSY_ALL_CORE
Be able to describe the benefit of medication/treatment
Take all medications as prescribed

## 2022-09-07 NOTE — BH INPATIENT PSYCHIATRY PROGRESS NOTE - PRN MEDS
MEDICATIONS  (PRN):  acetaminophen     Tablet .. 650 milliGRAM(s) Oral every 6 hours PRN Mild Pain (1 - 3), Moderate Pain (4 - 6)  benzocaine 15 mG/menthol 3.6 mG Lozenge 1 Lozenge Oral every 4 hours PRN sore throat  diphenhydrAMINE 50 milliGRAM(s) Oral every 4 hours PRN agitation / eps  diphenhydrAMINE Injectable 50 milliGRAM(s) IntraMuscular once PRN agitation / eps  haloperidol     Tablet 5 milliGRAM(s) Oral every 6 hours PRN agitation  haloperidol    Injectable 5 milliGRAM(s) IntraMuscular once PRN agitation  ibuprofen  Tablet. 600 milliGRAM(s) Oral every 8 hours PRN Severe Pain (7 - 10)  LORazepam     Tablet 2 milliGRAM(s) Oral every 6 hours PRN agitation  LORazepam   Injectable 2 milliGRAM(s) IntraMuscular once PRN agitation  
MEDICATIONS  (PRN):  acetaminophen     Tablet .. 650 milliGRAM(s) Oral every 6 hours PRN Mild Pain (1 - 3), Moderate Pain (4 - 6)  diphenhydrAMINE 50 milliGRAM(s) Oral every 4 hours PRN agitation / eps  diphenhydrAMINE Injectable 50 milliGRAM(s) IntraMuscular once PRN agitation / eps  haloperidol     Tablet 5 milliGRAM(s) Oral every 6 hours PRN agitation  haloperidol    Injectable 5 milliGRAM(s) IntraMuscular once PRN agitation  ibuprofen  Tablet. 600 milliGRAM(s) Oral every 8 hours PRN Severe Pain (7 - 10)  LORazepam     Tablet 2 milliGRAM(s) Oral every 6 hours PRN agitation  LORazepam   Injectable 2 milliGRAM(s) IntraMuscular once PRN agitation  
MEDICATIONS  (PRN):  diphenhydrAMINE 50 milliGRAM(s) Oral every 4 hours PRN agitation / eps  diphenhydrAMINE Injectable 50 milliGRAM(s) IntraMuscular once PRN agitation / eps  haloperidol     Tablet 5 milliGRAM(s) Oral every 6 hours PRN agitation  haloperidol    Injectable 5 milliGRAM(s) IntraMuscular once PRN agitation  LORazepam     Tablet 2 milliGRAM(s) Oral every 6 hours PRN agitation  LORazepam   Injectable 2 milliGRAM(s) IntraMuscular once PRN agitation  
MEDICATIONS  (PRN):  acetaminophen     Tablet .. 650 milliGRAM(s) Oral every 6 hours PRN Mild Pain (1 - 3), Moderate Pain (4 - 6)  diphenhydrAMINE 50 milliGRAM(s) Oral every 4 hours PRN agitation / eps  diphenhydrAMINE Injectable 50 milliGRAM(s) IntraMuscular once PRN agitation / eps  haloperidol     Tablet 5 milliGRAM(s) Oral every 6 hours PRN agitation  haloperidol    Injectable 5 milliGRAM(s) IntraMuscular once PRN agitation  LORazepam     Tablet 2 milliGRAM(s) Oral every 6 hours PRN agitation  LORazepam   Injectable 2 milliGRAM(s) IntraMuscular once PRN agitation  
MEDICATIONS  (PRN):  acetaminophen     Tablet .. 650 milliGRAM(s) Oral every 6 hours PRN Mild Pain (1 - 3), Moderate Pain (4 - 6)  diphenhydrAMINE 50 milliGRAM(s) Oral every 4 hours PRN agitation / eps  diphenhydrAMINE Injectable 50 milliGRAM(s) IntraMuscular once PRN agitation / eps  haloperidol     Tablet 5 milliGRAM(s) Oral every 6 hours PRN agitation  haloperidol    Injectable 5 milliGRAM(s) IntraMuscular once PRN agitation  LORazepam     Tablet 2 milliGRAM(s) Oral every 6 hours PRN agitation  LORazepam   Injectable 2 milliGRAM(s) IntraMuscular once PRN agitation  
MEDICATIONS  (PRN):  acetaminophen     Tablet .. 650 milliGRAM(s) Oral every 6 hours PRN Mild Pain (1 - 3), Moderate Pain (4 - 6)  diphenhydrAMINE 50 milliGRAM(s) Oral every 4 hours PRN agitation / eps  diphenhydrAMINE Injectable 50 milliGRAM(s) IntraMuscular once PRN agitation / eps  haloperidol     Tablet 5 milliGRAM(s) Oral every 6 hours PRN agitation  haloperidol    Injectable 5 milliGRAM(s) IntraMuscular once PRN agitation  ibuprofen  Tablet. 600 milliGRAM(s) Oral every 8 hours PRN Severe Pain (7 - 10)  LORazepam     Tablet 2 milliGRAM(s) Oral every 6 hours PRN agitation  LORazepam   Injectable 2 milliGRAM(s) IntraMuscular once PRN agitation  
MEDICATIONS  (PRN):  acetaminophen     Tablet .. 650 milliGRAM(s) Oral every 6 hours PRN Mild Pain (1 - 3), Moderate Pain (4 - 6)  diphenhydrAMINE 50 milliGRAM(s) Oral every 4 hours PRN agitation / eps  diphenhydrAMINE Injectable 50 milliGRAM(s) IntraMuscular once PRN agitation / eps  haloperidol     Tablet 5 milliGRAM(s) Oral every 6 hours PRN agitation  haloperidol    Injectable 5 milliGRAM(s) IntraMuscular once PRN agitation  LORazepam     Tablet 2 milliGRAM(s) Oral every 6 hours PRN agitation  LORazepam   Injectable 2 milliGRAM(s) IntraMuscular once PRN agitation  
MEDICATIONS  (PRN):  acetaminophen     Tablet .. 650 milliGRAM(s) Oral every 6 hours PRN Mild Pain (1 - 3), Moderate Pain (4 - 6)  benzocaine 15 mG/menthol 3.6 mG Lozenge 1 Lozenge Oral every 4 hours PRN sore throat  diphenhydrAMINE 50 milliGRAM(s) Oral every 4 hours PRN agitation / eps  diphenhydrAMINE Injectable 50 milliGRAM(s) IntraMuscular once PRN agitation / eps  guaiFENesin Oral Liquid (Sugar-Free) 100 milliGRAM(s) Oral every 6 hours PRN cough  haloperidol     Tablet 5 milliGRAM(s) Oral every 6 hours PRN agitation  haloperidol    Injectable 5 milliGRAM(s) IntraMuscular once PRN agitation  ibuprofen  Tablet. 600 milliGRAM(s) Oral every 8 hours PRN Severe Pain (7 - 10)  LORazepam     Tablet 2 milliGRAM(s) Oral every 6 hours PRN agitation  LORazepam   Injectable 2 milliGRAM(s) IntraMuscular once PRN agitation

## 2022-09-07 NOTE — BH INPATIENT PSYCHIATRY DISCHARGE NOTE - NSDCRECOMMENDMEDICALFT_PSY_ALL_CORE
Follow-up with PCP and psychiatry.   Continue to use ice/heat on right foot. Follow-up with ortho if pain continues. X-ray revealed no fracture.

## 2022-09-07 NOTE — BH INPATIENT PSYCHIATRY DISCHARGE NOTE - HPI (INCLUDE ILLNESS QUALITY, SEVERITY, DURATION, TIMING, CONTEXT, MODIFYING FACTORS, ASSOCIATED SIGNS AND SYMPTOMS)
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 Jeovanny Narvaez at [facility], brought in by family for LOC. Psych consulted for medication management. As patient was being followed by C/L team, appeared to be manic and not at his baseline per family. Patient transferred to [facility] for decompensation of bipolar disorder.    On initial interview on Low 4, patient states that he's "good." States that his brother says he's "mental," but he doesn't need to be here since he feels well. Patient denies changes in sleep, appetite, and energy levels. Denies SI/HI/I/P. Denies AVH. He inquires about discharge and is amenable to ongoing hospitalization. Does not feel he has a mental illness.     Per C/L assessment note from :  "Patient presented to [facility] twice in the last few days with syncope and left AMA x2 after being admitted. Previous syncopal episode resulted in facial laceration, repaired by plastics in the ED at [facility]. Cardiac workup incomplete however found to be bradycardic with incomplete RBBB on EKG. Now presenting after another episode of syncope overnight. Patient accompanied by brother who claims that he was watering tomatoes in his garden at 2am when he suddenly passed out. Patient endorses he passed out, and that he got back up after a few minutes.  He reports LOC, Head strike. He went to bed after and then called EMS in the morning.    On initial interview the patient was resting comfortably in a hospital bed in the ED. He has poor articulation and has a slightly disorganized TP with poor memory. He admits that he takes his medications irregularly "when I feel depressed" but says that he has been taking them as prescribed since Saturday. Is unsure of the dosages of his medications but says he takes abilify, mirtazapine, and zoloft. Says that he has been sleeping fine at home. Discusses psychiatric history with bright affect throughout interview, overly familiar and innappropriate at times. Denies any SI/HI/AVH. Denies substance abuse. Does not want to stay in the hospital but understands that he needs a cardiac workup. Patient says that he does not like his outpatient and psychiatrist and would like a new one, is willing to pay whatever it costs."     During follow-up with C/L, patient presented with irritable/labile mood and was focused on discharge. Per collateral from ED assessment on , the family indicated that the patient had been non-compliant with medication, irritable, gambling, behaving erratically, and more verbally aggressive. Per Dr. Flores email message- "Sending AOPD of Dr. Flores, patient Brandon Zambrano ( [birth year]) for psych assessment and admission via EMS. Pt is unable to sit through voluntary admission procedures,. Pt is nonadherent to medication, manic, gambling, spending all of his money, irritable, unable to care for self. Not violent, no SI, poor insight. Prescribed Abilify 5mg qhs, Mirtazapine 30mg qhs, Seroquel, 300mg qhs, Zoloft 100mg 2 tablets daily. Family are present and provided collateral regarding patient's behaviors."

## 2022-09-07 NOTE — BH INPATIENT PSYCHIATRY DISCHARGE NOTE - ATTENDING DISCHARGE PHYSICAL EXAMINATION:
On day of discharge, patient reports feeling significantly calmer, congruent with exam. He demonstrates some insight regarding manic presentation, reports medication helpful and plans to continue, seeking help with gambling, expressing intent to continue therapy. Acute risk adequately reduced to no longer require inpatient psychiatric hospitalization though chronic elevated risk persists, ultimately partially modifiable through engagement in long-term treatment.

## 2022-09-07 NOTE — BH INPATIENT PSYCHIATRY PROGRESS NOTE - NSTXMANICDATETRGT_PSY_ALL_CORE
07-Sep-2022
Quality 226: Preventive Care And Screening: Tobacco Use: Screening And Cessation Intervention: Patient screened for tobacco and is an ex-smoker
07-Sep-2022
Detail Level: Detailed
07-Sep-2022

## 2022-09-07 NOTE — BH INPATIENT PSYCHIATRY PROGRESS NOTE - NSTXMEDICDATEEST_PSY_ALL_CORE
27-Aug-2022
27-Aug-2022
31-Aug-2022
31-Aug-2022
27-Aug-2022
31-Aug-2022
02-Sep-2022
27-Aug-2022
27-Aug-2022

## 2022-09-07 NOTE — BH INPATIENT PSYCHIATRY PROGRESS NOTE - NSTXMEDICPROGRES_PSY_ALL_CORE
Met - goal discontinued
Improving
Met - goal discontinued
Improving
Met - goal discontinued
Improving
Met - goal discontinued

## 2022-09-07 NOTE — BH INPATIENT PSYCHIATRY PROGRESS NOTE - NSTXMANICGOAL_PSY_ALL_CORE
Exhibit a substantial reduction in elated/angry acting out, and pressured speech that prevents mutual conversation
Demonstrate a substantial reduction in both hyperactive pacing on the unit and social intrusiveness
Exhibit a substantial reduction in elated/angry acting out, and pressured speech that prevents mutual conversation

## 2022-09-07 NOTE — BH INPATIENT PSYCHIATRY DISCHARGE NOTE - HOSPITAL COURSE
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 Jeovanny Narvaez at [facility], brought in by family for LOC. Psych consulted for medication management. As patient was being followed by C/L team, appeared to be manic and not at his baseline per family. Patient transferred to [facility] for decompensation of bipolar disorder.  On initial interview on Low 4, patient states that he's "good." States that his brother says he's "mental," but he doesn't need to be here since he feels well. Patient denies changes in sleep, appetite, and energy levels. Denies SI/HI/I/P. Denies AVH. He inquires about discharge and is amenable to ongoing hospitalization. Does not feel he has a mental illness.   Per C/L assessment note from :  "Patient presented to [facility] twice in the last few days with syncope and left AMA x2 after being admitted. Previous syncopal episode resulted in facial laceration, repaired by plastics in the ED at [facility]. Cardiac workup incomplete however found to be bradycardic with incomplete RBBB on EKG. Now presenting after another episode of syncope overnight. Patient accompanied by brother who claims that he was watering tomatoes in his garden at 2am when he suddenly passed out. Patient endorses he passed out, and that he got back up after a few minutes.  He reports LOC, Head strike. He went to bed after and then called EMS in the morning.  On initial interview the patient was resting comfortably in a hospital bed in the ED. He has poor articulation and has a slightly disorganized TP with poor memory. He admits that he takes his medications irregularly "when I feel depressed" but says that he has been taking them as prescribed since Saturday. Is unsure of the dosages of his medications but says he takes Abilify, mirtazapine, and Zoloft. Says that he has been sleeping fine at home. Discusses psychiatric history with bright affect throughout interview, overly familiar and inappropriate at times. Denies any SI/HI/AVH. Denies substance abuse. Does not want to stay in the hospital but understands that he needs a cardiac workup. Patient says that he does not like his outpatient and psychiatrist and would like a new one, is willing to pay whatever it costs."   During follow-up with C/L, patient presented with irritable/labile mood and was focused on discharge. Per collateral from ED assessment on , the family indicated that the patient had been non-compliant with medication, irritable, gambling, behaving erratically, and more verbally aggressive. Per Dr. Flores email message- "Sending AOPD of Dr. Flores, patient Brandon Zambrano ( [birth year]) for psych assessment and admission via EMS. Pt is unable to sit through voluntary admission procedures. Pt is nonadherent to medication, manic, gambling, spending all of his money, irritable, unable to care for self. Not violent, no SI, poor insight. Prescribed Abilify 5mg qhs, Mirtazapine 30mg qhs, Seroquel, 300mg qhs, Zoloft 100mg 2 tablets daily. Family are present and provided collateral regarding patient's behaviors."  Upon transfer from [facility], the pt. was irritable, oppositional, intrusive, had pressured speech, insomnia and was tangential. He frequently interrupted the writer, reasoning and insight were poor. Pt. denies paranoia or Hx of psychosis w/ sowmya but evidenced on exam. The pt. was discharged focused, providing illogical justifications for discharge. With titration of Depakote and addition of Ativan at bedtime, Sx slowly attenuated. By day of discharge, speech was WNL, he was more cooperative and less oppositional, the pt. was less irritable and endorsed feeling calmer, and thought process was notably more goal oriented. Insight and reasoning remain impaired. The pt. stated he felt the medications were effective and is committed to treatment adherence. Pt. was also advised to drive with family or friends immediately post hospitalization to ensure motor skills and reactivity are at baseline. The patient's family expressed concern as the pt. obtained multiple speeding tickets prior to admission. The pt. stated he understood the concern and would adhere to recommendations. The writer advised the pt. to seek Tx for gambling with his union as he previously mentioned. He declined assistance from the writer and SW.  Pt. and family also advised to ensure follow-up with cardiology. Although hypomanic Sx persist, on day of discharge, he was no longer an acute risk to himself or others.   Abilify Maintena 400mg was administered on ; pt. to supplement with Abilify 20mg PO until . Depakene titrated to 750mg AM and 1000mg HS; switched to Depakote ER 1750mg at bedtime prior to discharge. VPA level on  was 68.90. Ativan 2mg administered at bedtime for manic Sx. Ativan switched to Klonopin 1mg at bedtime prior to discharge for residual hypomanic Sx and to ensure sleep; plan is to administer for 2 weeks and discontinue.   The patient was made aware of the risks and benefits of each medication and tolerated them well with no apparent side effects.   Patient did become agitated but did not require emergency intramuscular medications or seclusion/restraints and did not self-harm on the unit. Patient got along appropriately with most staff and peers.  A family meeting was held prior to discharge for safety planning and disposition planning. Family is supportive and available.    Medically, during this hospitalization the pt. was monitored by medicine post transfer from [facility] for syncope. No syncopal episodes on the unit. Pt. has loop recorder in place and confirms follow-up with outpatient cardiology.   Xray of right foot completed after pt. c/o pain and swelling after playing soccer. X-ray was negative was fracture. Pt. advised to follow-up with ortho if pain persist. Ibuprofen ordered x5 days as needed for pain as well.  Suicidal and aggression risk assessments were performed on the day of discharge. The patient is at elevated chronic risk of self-harm due to an underlying mood disorder. He is not actively suicidal. Sx of hypomania remain but he is appropriate for less restrictive treatment as an outpatient without need for continued inpatient hospitalization. Protective factors for suicide include future orientation, social support, dependents, engagement in work/school, residential stability. Risk factors include insomnia, impulsivity, psychosocial stressors, non-compliance, disengagement with treatment, gambling addiction, impaired insight, male gender, mood disorder.  Immediate risk was minimized by inpatient admission to a safe environment with appropriate supervision and limited access to lethal means. Future risk was minimized before discharge by treatment of mood symptoms, maximizing outpatient support, providing relevant patient education, discussing emergency procedures, and ensuring close follow-up. Patient denies all suicidal and aggressive/homicidal ideation, intent and plan, and, in view of demonstrated clinical improvement, is not judged to be an acute danger to self or others at this time. Although the patient remains at their chronic baseline risk of self-harm, such risk cannot be further ameliorated by continued inpatient treatment and the patient is therefore appropriate for discharge.   On the day of discharge, the patient’s mood and affect are normal/euthymic. Patient denies depressed mood and anxiety. Patient is not hopeless. Sleep and appetite are also normal (at baseline).  Pt’s motor performance and productivity of speech are WNL. Pt denies symptoms of psychosis including AH/VH with no apparent delusions (or is at baseline/not preoccupied with delusions).  Patient denies S/H/I/I/P.   Patient has made clinically meaningful progress during this hospitalization and has clearly benefited from medications and psychotherapy. On day of discharge, the patient has improved significantly and no longer requires inpatient treatment and care. Pt will be discharged and follow-up with outpatient care.    Patient was provided with extensive psychoeducation on treatment options and motivational counseling targeting healthy lifestyle. Patient was instructed on actions for crisis situations, understood and agreed to follow instructions for handling crisis, including coming to ER or calling 911 should the patient or their family feel that they are in danger of hurting self or others. Patient also was given Suicide Prevention Lifeline number 1-162.559.8646 and provided with instructions on its use.   Patient was advised to avoid driving until their reactions are not impaired by medications. Motivational counseling was provided. Family is supportive and was provided with similar safety plan instructions.   Patient will be discharged with the following DSM5 Diagnoses:    PRINCIPAL DISCHARGE DIAGNOSIS  Diagnosis: Bipolar 1 disorder    Patient will be discharged on the following medications:   Abilify Maintena 400 mg intramuscular injection, extended release: 400 milligram(s) intramuscular once a month  Next due   ARIPiprazole 20 mg oral tablet: 1 tab(s) orally once a day- last dose   clonazePAM 1 mg oral tablet: 1 tab(s) orally once a day (at bedtime) MDD:1mg  Plan: administer 1mg at bedtime for 1 week, then taper to 0.5mg at bedtime for 1 week. Can adjust timing and dose as needed per assessment.   Depakote  mg oral tablet, extended release: 1 tab(s) orally once a day (at bedtime)   Depakote  mg oral tablet, extended release: 3 tab(s) orally once a day (at bedtime)   folic acid 1 mg oral tablet: 1 tab(s) orally once a day  ibuprofen 600 mg oral tablet: 1 tab(s) orally every 8 hours, As needed  Take w/ food  Multiple Vitamins oral tablet: 1 tab(s) orally once a day    Handoff emailed to Dr. Flores at [facility] -[phone], including discussion of hospital course, treatment, and medications. The patient’s appointment is on  at 16:30.  Inpatient Provider:  SIMBA Garcia-BC  [phone]

## 2022-09-07 NOTE — BH INPATIENT PSYCHIATRY DISCHARGE NOTE - NSDCMRMEDTOKEN_GEN_ALL_CORE_FT
Abilify Maintena 400 mg intramuscular injection, extended release: 400 milligram(s) intramuscular once a month  Next due 9/28  ARIPiprazole 20 mg oral tablet: 1 tab(s) orally once a day- last dose 9/12  clonazePAM 1 mg oral tablet: 1 tab(s) orally once a day (at bedtime) MDD:1mg  Depakote  mg oral tablet, extended release: 1 tab(s) orally once a day (at bedtime)   Depakote  mg oral tablet, extended release: 3 tab(s) orally once a day (at bedtime)   folic acid 1 mg oral tablet: 1 tab(s) orally once a day  ibuprofen 600 mg oral tablet: 1 tab(s) orally every 8 hours, As needed  Take w/ food  Multiple Vitamins oral tablet: 1 tab(s) orally once a day

## 2022-09-07 NOTE — BH INPATIENT PSYCHIATRY DISCHARGE NOTE - OTHER PAST PSYCHIATRIC HISTORY (INCLUDE DETAILS REGARDING ONSET, COURSE OF ILLNESS, INPATIENT/OUTPATIENT TREATMENT)
Pt is a 51 year old male,  from wife, from [place], recently lost job, with a hx of bipolar disorder diagnosis. [facility] clinicals pt has a hx of multiple psychiatric hospitalizations most recently being in 2014. Per clinicals pt has a hx of 2 past SA (one via overdose and another involved a bridge but was self aborted). Per clinicals pt has a hx of gambling. pt endorses a hx of occasional alcohol use. [facility] pt has recent verbal aggression and multiple recent legal issues including speeding citations. [facility]s pt attends [facility] since 2014. [facility] pt has PMHx of multiple falls recently seen at [facility] on 8/7/22 needing suture on his head but signed out AMA refusing cardiac/telemetry work up. [facility]s pt has a 15 year old daughter who lives with pt's ex-wife.       Lmsw and NP met with pt to discuss admission and to formulate tx plan. pt presents with neutral mood and constricted affect. pt reports he does not understand why he is admitted psychiatrically as he went to the hospital for fainting. Pt endorses being inconsistent with psychiatric medications. pt's thought process appeared disorganized at times. pt denies SI/HI/AH/VH. Pt endorses last manic episode was a few months ago.

## 2022-09-07 NOTE — BH INPATIENT PSYCHIATRY DISCHARGE NOTE - NSDCRECOMMEND_PSY_ALL_CORE
PREOPERATIVE DIAGNOSES:   1. Obstructive Sleep Apnea  POSTOPERATIVE DIAGNOSES:   1. Obstructive Sleep Apnea  PROCEDURE PERFORMED: Bilateral Adenotonsillectomy   SURGEON: Hill Grider MD   ASSISTANT: None  BLOOD LOSS: 1 mL.   COMPLICATIONS: None.   SPECIMENS: bilateral tonsils for gross   ANESTHESIA: GETA.   INDICATIONS: Saul Read presented to me with noisy breathing and apnea during sleep. She was found to have very large tonsils on exam. I therefore recommended adenotonsillectomy, and after discussing the risk of bleeding, pain, and failure to eradicate symptoms, her parents agreed to proceed.  OPERATIVE PROCEDURE: After being taken to the operating room and induction of general endotracheal tube anesthesia, the bed was rotated 90 degrees and a shoulder roll and head turban were placed. I suspended the patient from the Chin stand using a David-Tad mouthgag, and I grasped the right tonsil with an Allis forceps and retracted medially and performed subcapsular dissection utilizing monopolar cautery, and the right tonsil came out very smoothly. I then turned my attention to the left side, once again using an Allis forceps to grasp it and retract it medially, and then I performed subcapsular dissection, and the left tonsil also came out very smoothly. I released the mouthgag for 2 minutes to allow recirculation of blood to the tongue.   I resuspended the patient from the Chin stand using a David-Tad mouthgag, and then ensured that there was good hemostasis using a bipolar. I placed a rubber catheter through the right nostril, and retracted the soft palate to visualize the large adenoid pad. The adenoid was ablated using suction cautery. Once the adenoid was sufficiently removed, the catheter was removed. I then removed the mouthgag and the bed was rotated 90 degrees after I removed the shoulder roll and head turban. The patient was awakened, extubated and sent to the recovery room in good condition.      Dr. Hill Grider  [facility]       Unrestricted diet/activity/Recommended laboratory tests/other investigations following discharge.../Recommended medical follow-up following discharge...

## 2022-09-07 NOTE — BH INPATIENT PSYCHIATRY PROGRESS NOTE - NSICDXBHPRIMARYDX_PSY_ALL_CORE
Bipolar 1 disorder   F31.9  

## 2022-09-07 NOTE — BH INPATIENT PSYCHIATRY PROGRESS NOTE - NSBHFUPINTERVALHXFT_PSY_A_CORE
Chart reviewed and discussed and with team. No events reported overnight. The pt. reports slightly better sleep overnight. No changes to appetite. Vitals are stable.  VPA level 68.90. CMP and CBC WNL.  Pt. was calmer and more cooperative. He appeared tired but stated he was not. He reported he feels "good." He reports feeling calmer with medication titration.   The writer and SW advised the pt. to refrain from driving prior to his follow-up outpatient appt. He was also advised to drive with family or friends when driving again post hospitalization to ensure motor skills and reactivity are at baseline. The patient's family expressed concern given the pt. obtained multiple speeding tickets prior to admission. The pt. stated he understood concern and would adhere to recommendations. The writer advised the pt. to seek help with gambling with his union as he previously reported. He declined assistance from the writer and SW.  Pt. and family also advised to ensure follow-up with cardiology. The pt. denied A/H, V/H, s/i/p, h/i/p and delusions. Pt. was seen by the treatment to assess risk. The pt. is not an acute risk of harm to herself or others and is appropriate for discharge. The pt. was advised to call 911, visit the nearest ED or the crisis clinic if Sx worsen.

## 2022-09-07 NOTE — BH INPATIENT PSYCHIATRY PROGRESS NOTE - NSBHFUPINTERVALCCFT_PSY_A_CORE
Patient seen for follow up for bipolar disorder.
f/u acute sowmya in bipolar disorder 1  
f/u acute sowmya in bipolar disorder 1  (Refused )
Patient seen for follow up for bipolar disorder.
f/u acute sowmya in bipolar disorder 1  (Refused )
f/u acute sowmya in bipolar disorder 1  (Refused )
"Three weeks I'm not sleeping. I'm not comfortable."  [place] : Carly [id number] 
f/u acute sowmya in bipolar disorder 1  (Refused )
f/u acute sowmya in bipolar disorder 1  
f/u acute sowmya in bipolar disorder 1

## 2022-09-07 NOTE — BH INPATIENT PSYCHIATRY PROGRESS NOTE - MSE UNSTRUCTURED FT
On exam today, patient is casually dressed. appears stated age with stitches near left eye, wound healing appropriately  Patient is alert, appears tired.   Speech is WNL  NO PMA/PMR.  Mood: "good"  Affect: euthymic, constricted  Thought Process: grossly tangential, concentration impaired but w/ notable improvement in linearity  Thought Content:  No evidence of s/i/p, h/i/p, A/H, V/H and delusions  Insight is limited.  Judgement is impaired  Gait: slight limp  Impulse control limited at this time.  
On exam today, patient is casually dressed. appears stated age with stitches near left eye, wound healing appropriately  Patient is drowsy, calm and cooperative.   Speech is pressured; normal tone and volume   Patient exhibits psychomotor agitation.  Mood: "fine"  Affect: labile and expansive  Thought Process: perseverative, tangential   Thought Content:  No evidence of s/i/p, h/i/p, A/H, V/H; paranoia suspected  Insight is limited.  Judgement is impaired  Gait is intact.  Impulse control limited at this time.  
On exam today, patient is casually dressed. appears stated age with stitches near left eye, wound healing appropriately  Patient is drowsy, calm and cooperative.   Speech is pressured; normal tone and volume   Patient exhibits psychomotor agitation.  Mood: "fine"  Affect: labile and expansive  Thought Process: grossly linear; perseverative, tangential at times  Thought Content:  No evidence of s/i/p, h/i/p, A/H, V/H; paranoia suspected  Insight is limited.  Judgement is impaired  Gait is intact.  Impulse control limited at this time.  
On exam today, patient is casually dressed. appears stated age with stitches near left eye, wound healing appropriately  Patient is alert, appears tired.   Speech is WNL  Patient exhibits psychomotor agitation.  Mood: "fine"  Affect: irritable; constricted  Thought Process: perseverative, tangential   Thought Content:  No evidence of s/i/p, h/i/p, A/H, V/H; remains suspicious of staff's intent   Insight is limited.  Judgement is impaired  Gait: slight limp  Impulse control limited at this time.  
On exam today, patient is casually dressed. appears stated age with stitches near left eye.  Patient is calm, cooperative with good eye contact.  Speech is of normal volume, rate, and tone.   Patient exhibits no psychomotor slowing or agitation.  Mood: "good"  Affect: neutral, constricted, congruent to mood, appropriate to content.  Thought Process: linear. overinclusive.    Thought Content: perseverative on discharge and feeling well. denies SI/HI/I/P.  Perception: denies AVH, suspected paranoid delusions.   Insight is limited.  Judgement is fair at time of interview.  Gait is intact.  Impulse control intact at this time.  
On exam today, patient is casually dressed. appears stated age with stitches near left eye.  Patient is calm, cooperative with good eye contact.  Speech is of normal volume, rate, and tone.   Patient exhibits no psychomotor slowing or agitation.  Mood: "sedated"  Affect: neutral, constricted, congruent to mood, appropriate to content.  Thought Process: linear. overinclusive.    Thought Content: perseverative on discharge and feeling well. denies SI/HI/I/P.  Perception: denies AVH.  Insight is limited.  Judgement is fair at time of interview.  Gait is intact.  Impulse control intact at this time.  
On exam today, patient is casually dressed. appears stated age with stitches near left eye.  Patient is drowsy, calm and cooperative.   Speech is slightly pressured; normal tone and volume   Patient exhibits no psychomotor slowing or agitation.  Mood: "fine"  Affect: worried/irritable, constricted; incongruent to mood  Thought Process: grossly linear; perseverative, tangential at times  Thought Content:  Denies s/i/p, h/i/p, A/H, V/H and delusions; paranoia suspected  Insight is limited.  Judgement is impaired  Gait is intact.  Impulse control intact at this time.  
On exam today, patient is casually dressed. appears stated age with stitches near left eye, wound healing appropriately  Patient is drowsy, irritable, oppositional  Speech is pressured; normal tone and volume   Patient exhibits psychomotor agitation.  Mood: "fine"  Affect: labile and expansive  Thought Process: perseverative, tangential   Thought Content:  No evidence of s/i/p, h/i/p, A/H, V/H; paranoia suspected  Insight is limited.  Judgement is impaired  Gait is intact.  Impulse control limited at this time.  
On exam today, patient is casually dressed. appears stated age with stitches near left eye, wound healing appropriately  Patient is drowsy, irritable, oppositional  Speech is pressured; normal tone and volume   Patient exhibits psychomotor agitation.  Mood: "fine"  Affect: labile and expansive  Thought Process: perseverative, tangential   Thought Content:  No evidence of s/i/p, h/i/p, A/H, V/H; paranoia suspected  Insight is limited.  Judgement is impaired  Gait is intact.  Impulse control limited at this time.  
On exam today, patient is casually dressed. appears stated age with stitches near left eye, wound healing appropriately  Patient is drowsy, calm and cooperative.   Speech is pressured; normal tone and volume   Patient exhibits psychomotor agitation.  Mood: "fine"  Affect: labile and expansive  Thought Process: perseverative, tangential   Thought Content:  No evidence of s/i/p, h/i/p, A/H, V/H; paranoia suspected  Insight is limited.  Judgement is impaired  Gait is intact.  Impulse control limited at this time.

## 2022-09-07 NOTE — BH INPATIENT PSYCHIATRY DISCHARGE NOTE - NSBHDCCONDITIONFT_PSY_A_CORE
symptomatic, continued hospitalization suggested, however, not covered by insurance. Pt. no longer an acute risk of harm to self and others

## 2022-09-07 NOTE — BH INPATIENT PSYCHIATRY PROGRESS NOTE - NSTXMEDICDATETRGT_PSY_ALL_CORE
03-Sep-2022
03-Sep-2022
02-Sep-2022
07-Sep-2022
02-Sep-2022
07-Sep-2022
02-Sep-2022
07-Sep-2022
07-Sep-2022

## 2022-09-07 NOTE — BH INPATIENT PSYCHIATRY PROGRESS NOTE - NSBHCHARTREVIEWVS_PSY_A_CORE FT
Vital Signs Last 24 Hrs  T(C): 36.9 (08-28-22 @ 05:44), Max: 37.1 (08-27-22 @ 19:19)  T(F): 98.5 (08-28-22 @ 05:44), Max: 98.7 (08-27-22 @ 19:19)  HR: --  BP: --  BP(mean): --  RR: --  SpO2: --    Orthostatic VS  08-28-22 @ 05:44  Lying BP: --/-- HR: --  Sitting BP: 103/77 HR: 66  Standing BP: 110/79 HR: 75  Site: --  Mode: --  Orthostatic VS  08-27-22 @ 19:19  Lying BP: --/-- HR: --  Sitting BP: 118/79 HR: 71  Standing BP: 115/76 HR: 76  Site: --  Mode: --  Orthostatic VS  08-26-22 @ 19:50  Lying BP: --/-- HR: --  Sitting BP: 111/83 HR: 53  Standing BP: 107/77 HR: 71  Site: --  Mode: --  Orthostatic VS  08-26-22 @ 16:12  Lying BP: --/-- HR: --  Sitting BP: 131/68 HR: 62  Standing BP: 125/82 HR: 71  Site: --  Mode: --  
Vital Signs Last 24 Hrs  T(C): 36.4 (09-01-22 @ 14:28), Max: 36.7 (08-31-22 @ 19:05)  T(F): 97.6 (09-01-22 @ 14:28), Max: 98 (08-31-22 @ 19:05)  HR: --  BP: --  BP(mean): --  RR: --  SpO2: --    Orthostatic VS  09-01-22 @ 08:08  Lying BP: --/-- HR: --  Sitting BP: 111/67 HR: 71  Standing BP: 116/70 HR: 80  Site: upper left arm  Mode: electronic  Orthostatic VS  08-31-22 @ 19:05  Lying BP: --/-- HR: --  Sitting BP: 126/81 HR: 66  Standing BP: 118/85 HR: 78  Site: --  Mode: --  Orthostatic VS  08-31-22 @ 08:33  Lying BP: --/-- HR: --  Sitting BP: 113/73 HR: 60  Standing BP: 105/75 HR: 67  Site: --  Mode: electronic  Orthostatic VS  08-30-22 @ 19:56  Lying BP: --/-- HR: --  Sitting BP: 118/76 HR: 92  Standing BP: 112/80 HR: 85  Site: --  Mode: --  
Vital Signs Last 24 Hrs  T(C): 36.3 (08-31-22 @ 08:33), Max: 36.9 (08-30-22 @ 19:56)  T(F): 97.4 (08-31-22 @ 08:33), Max: 98.4 (08-30-22 @ 19:56)  HR: --  BP: --  BP(mean): --  RR: --  SpO2: --    Orthostatic VS  08-31-22 @ 08:33  Lying BP: --/-- HR: --  Sitting BP: 113/73 HR: 60  Standing BP: 105/75 HR: 67  Site: --  Mode: electronic  Orthostatic VS  08-30-22 @ 19:56  Lying BP: --/-- HR: --  Sitting BP: 118/76 HR: 92  Standing BP: 112/80 HR: 85  Site: --  Mode: --  Orthostatic VS  08-30-22 @ 08:03  Lying BP: --/-- HR: --  Sitting BP: 118/80 HR: 61  Standing BP: 107/76 HR: 77  Site: --  Mode: --  Orthostatic VS  08-29-22 @ 19:20  Lying BP: --/-- HR: --  Sitting BP: 124/76 HR: 65  Standing BP: 106/73 HR: 72  Site: --  Mode: --  
Vital Signs Last 24 Hrs  T(C): 36.4 (09-06-22 @ 06:34), Max: 36.4 (09-06-22 @ 06:34)  T(F): 97.6 (09-06-22 @ 06:34), Max: 97.6 (09-06-22 @ 06:34)  HR: --  BP: --  BP(mean): --  RR: --  SpO2: --    Orthostatic VS  09-06-22 @ 06:34  Lying BP: --/-- HR: --  Sitting BP: 102/60 HR: 62  Standing BP: 96/70 HR: 71  Site: --  Mode: --  Orthostatic VS  09-05-22 @ 19:28  Lying BP: --/-- HR: --  Sitting BP: 127/76 HR: 75  Standing BP: 105/73 HR: 80  Site: --  Mode: --  Orthostatic VS  09-05-22 @ 08:08  Lying BP: --/-- HR: --  Sitting BP: 105/81 HR: 62  Standing BP: 97/75 HR: 76  Site: --  Mode: --  Orthostatic VS  09-04-22 @ 19:37  Lying BP: --/-- HR: --  Sitting BP: 122/66 HR: 68  Standing BP: 105/68 HR: 79  Site: --  Mode: electronic  
Vital Signs Last 24 Hrs  T(C): 36.2 (08-27-22 @ 06:32), Max: 36.6 (08-26-22 @ 16:12)  T(F): 97.2 (08-27-22 @ 06:32), Max: 97.9 (08-26-22 @ 16:12)  HR: 70 (08-26-22 @ 12:34) (70 - 70)  BP: 122/67 (08-26-22 @ 12:34) (122/67 - 122/67)  BP(mean): --  RR: 18 (08-26-22 @ 16:12) (17 - 18)  SpO2: 100% (08-26-22 @ 12:34) (100% - 100%)    Orthostatic VS  08-26-22 @ 19:50  Lying BP: --/-- HR: --  Sitting BP: 111/83 HR: 53  Standing BP: 107/77 HR: 71  Site: --  Mode: --  Orthostatic VS  08-26-22 @ 16:12  Lying BP: --/-- HR: --  Sitting BP: 131/68 HR: 62  Standing BP: 125/82 HR: 71  Site: --  Mode: --  
Vital Signs Last 24 Hrs  T(C): --  T(F): --  HR: --  BP: --  BP(mean): --  RR: --  SpO2: --    Orthostatic VS  09-07-22 @ 14:17  Lying BP: --/-- HR: --  Sitting BP: 115/70 HR: 78  Standing BP: 106/68 HR: 86  Site: --  Mode: electronic  Orthostatic VS  09-06-22 @ 19:01  Lying BP: --/-- HR: --  Sitting BP: 120/72 HR: 72  Standing BP: 120/67 HR: 75  Site: --  Mode: --  
Vital Signs Last 24 Hrs  T(C): 36.7 (09-02-22 @ 14:20), Max: 37.2 (09-01-22 @ 19:17)  T(F): 98 (09-02-22 @ 14:20), Max: 98.9 (09-01-22 @ 19:17)  HR: --  BP: --  BP(mean): --  RR: --  SpO2: --    Orthostatic VS  09-02-22 @ 05:57  Lying BP: --/-- HR: --  Sitting BP: 110/74 HR: 67  Standing BP: 102/78 HR: 79  Site: --  Mode: --  Orthostatic VS  09-01-22 @ 19:17  Lying BP: --/-- HR: --  Sitting BP: 123/77 HR: 75  Standing BP: 119/73 HR: 87  Site: --  Mode: --  Orthostatic VS  09-01-22 @ 08:08  Lying BP: --/-- HR: --  Sitting BP: 111/67 HR: 71  Standing BP: 116/70 HR: 80  Site: upper left arm  Mode: electronic  Orthostatic VS  08-31-22 @ 19:05  Lying BP: --/-- HR: --  Sitting BP: 126/81 HR: 66  Standing BP: 118/85 HR: 78  Site: --  Mode: --  
Vital Signs Last 24 Hrs  T(C): 36.8 (08-29-22 @ 06:07), Max: 37 (08-28-22 @ 19:44)  T(F): 98.2 (08-29-22 @ 06:07), Max: 98.6 (08-28-22 @ 19:44)  HR: --  BP: --  BP(mean): --  RR: --  SpO2: --    Orthostatic VS  08-29-22 @ 06:07  Lying BP: --/-- HR: --  Sitting BP: 115/73 HR: 74  Standing BP: 104/72 HR: 91  Site: --  Mode: --  Orthostatic VS  08-28-22 @ 19:44  Lying BP: --/-- HR: --  Sitting BP: 114/73 HR: 72  Standing BP: 94/68 HR: 87  Site: --  Mode: --  Orthostatic VS  08-28-22 @ 05:44  Lying BP: --/-- HR: --  Sitting BP: 103/77 HR: 66  Standing BP: 110/79 HR: 75  Site: --  Mode: --  Orthostatic VS  08-27-22 @ 19:19  Lying BP: --/-- HR: --  Sitting BP: 118/79 HR: 71  Standing BP: 115/76 HR: 76  Site: --  Mode: --  
Vital Signs Last 24 Hrs  T(C): 36.4 (09-03-22 @ 08:45), Max: 37 (09-02-22 @ 19:43)  T(F): 97.5 (09-03-22 @ 08:45), Max: 98.6 (09-02-22 @ 19:43)  HR: --  BP: --  BP(mean): --  RR: --  SpO2: --    Orthostatic VS  09-03-22 @ 08:45  Lying BP: --/-- HR: --  Sitting BP: 112/74 HR: 68  Standing BP: 104/80 HR: 113  Site: --  Mode: --  Orthostatic VS  09-02-22 @ 19:43  Lying BP: --/-- HR: --  Sitting BP: 133/81 HR: 69  Standing BP: 118/80 HR: 80  Site: --  Mode: --  Orthostatic VS  09-02-22 @ 05:57  Lying BP: --/-- HR: --  Sitting BP: 110/74 HR: 67  Standing BP: 102/78 HR: 79  Site: --  Mode: --  Orthostatic VS  09-01-22 @ 19:17  Lying BP: --/-- HR: --  Sitting BP: 123/77 HR: 75  Standing BP: 119/73 HR: 87  Site: --  Mode: --  
Vital Signs Last 24 Hrs  T(C): 36.3 (08-31-22 @ 08:33), Max: 36.9 (08-30-22 @ 19:56)  T(F): 97.4 (08-31-22 @ 08:33), Max: 98.4 (08-30-22 @ 19:56)  HR: --  BP: --  BP(mean): --  RR: --  SpO2: --    Orthostatic VS  08-31-22 @ 08:33  Lying BP: --/-- HR: --  Sitting BP: 113/73 HR: 60  Standing BP: 105/75 HR: 67  Site: --  Mode: electronic  Orthostatic VS  08-30-22 @ 19:56  Lying BP: --/-- HR: --  Sitting BP: 118/76 HR: 92  Standing BP: 112/80 HR: 85  Site: --  Mode: --  Orthostatic VS  08-30-22 @ 08:03  Lying BP: --/-- HR: --  Sitting BP: 118/80 HR: 61  Standing BP: 107/76 HR: 77  Site: --  Mode: --  Orthostatic VS  08-29-22 @ 19:20  Lying BP: --/-- HR: --  Sitting BP: 124/76 HR: 65  Standing BP: 106/73 HR: 72  Site: --  Mode: --

## 2022-09-07 NOTE — BH INPATIENT PSYCHIATRY PROGRESS NOTE - NSBHASSESSSUMMFT_PSY_ALL_CORE
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 Jeovanny Narvaez at [facility], hx of gambling, no substance use disorder, + recent verbally aggressive behavior. + recent multiple legal issues- speeding citations, brought in by family for LOC. 2 recent falls and seen at [facility] ED last on 8/7/22- suture to head and signed out AMA refusing cardiac/telemetry work up. Psych consulted for medication management. As patient was being followed by C/L team, appeared to be manic and not at his baseline per family. Patient transferred to [facility] for decompensation of bipolar disorder.    Pt. demonstrates attenuation of manic Sx including pressured speech, PMA, irritability and tangentiality. He was less oppositional with the Tx team. Pt is not an acute risk to self or others, therefore will be discharged. Insurance coverage ends today.    Plan:    Psychiatry: Abilify 20 mg qd x14 days (stop 9/12). Abilify Maintena 400mg on 8/31  Depakote ER 1750mg   VPA level on 9/6: 68.90  Klonopin 1mg HS x7 days; 0.5mg x7 days   Dispo: return to Dr. Escalona at [facility]

## 2022-09-07 NOTE — BH INPATIENT PSYCHIATRY PROGRESS NOTE - NSBHATTESTAPPBILLTIME_PSY_A_CORE
I attest my time as KENNA is greater than 50% of the total combined time spent on qualifying patient care activities. I have reviewed and verified the documentation.

## 2022-09-07 NOTE — BH INPATIENT PSYCHIATRY PROGRESS NOTE - NSBHATTESTTYPEVISIT_PSY_A_CORE
Attending Only
KENNA without on-site Attending supervision
KENNA without on-site Attending supervision
Attending Only
KENNA without on-site Attending supervision
Attending Only
On-site Attending supervising KENNA (99XXX codes)

## 2022-09-07 NOTE — BH INPATIENT PSYCHIATRY PROGRESS NOTE - NSBHATTESTBILLINGAW_PSY_A_CORE
[id number] Inpatient care - low complexity - 15 minutes
[id number]-Wewspimhxy Inpatient care - low complexity - 15 minutes
[id number]-Bppezmzmbz Inpatient care - moderate complexity - 25 minutes
[id number]-Wmghhgarzd Inpatient care - low complexity - 15 minutes
[id number]-Kgqjochump Inpatient care - moderate complexity - 25 minutes
[id number]-Qmvamlisoc Inpatient care - moderate complexity - 25 minutes
[id number]-Fmgzlvxzfh Inpatient care - moderate complexity - 25 minutes
[id number]-Cmviqthnxx Inpatient care - moderate complexity - 25 minutes
[id number]-Mxqtyrkjtq Inpatient care - moderate complexity - 25 minutes
[id number]-Zcsmpvqshn Inpatient care - moderate complexity - 25 minutes

## 2022-09-07 NOTE — BH INPATIENT PSYCHIATRY PROGRESS NOTE - CURRENT MEDICATION
MEDICATIONS  (STANDING):  ARIPiprazole 20 milliGRAM(s) Oral daily  diVALproex  milliGRAM(s) Oral two times a day  melatonin. 5 milliGRAM(s) Oral at bedtime    MEDICATIONS  (PRN):  acetaminophen     Tablet .. 650 milliGRAM(s) Oral every 6 hours PRN Mild Pain (1 - 3), Moderate Pain (4 - 6)  diphenhydrAMINE 50 milliGRAM(s) Oral every 4 hours PRN agitation / eps  diphenhydrAMINE Injectable 50 milliGRAM(s) IntraMuscular once PRN agitation / eps  haloperidol     Tablet 5 milliGRAM(s) Oral every 6 hours PRN agitation  haloperidol    Injectable 5 milliGRAM(s) IntraMuscular once PRN agitation  LORazepam     Tablet 2 milliGRAM(s) Oral every 6 hours PRN agitation  LORazepam   Injectable 2 milliGRAM(s) IntraMuscular once PRN agitation  
MEDICATIONS  (STANDING):  ARIPiprazole 20 milliGRAM(s) Oral daily  clonazePAM Oral Disintegrating Tablet 1 milliGRAM(s) Oral at bedtime  lidocaine   4% Patch 1 Patch Transdermal daily  valproic  acid Syrup 1000 milliGRAM(s) Oral at bedtime    MEDICATIONS  (PRN):  acetaminophen     Tablet .. 650 milliGRAM(s) Oral every 6 hours PRN Mild Pain (1 - 3), Moderate Pain (4 - 6)  benzocaine 15 mG/menthol 3.6 mG Lozenge 1 Lozenge Oral every 4 hours PRN sore throat  diphenhydrAMINE 50 milliGRAM(s) Oral every 4 hours PRN agitation / eps  diphenhydrAMINE Injectable 50 milliGRAM(s) IntraMuscular once PRN agitation / eps  guaiFENesin Oral Liquid (Sugar-Free) 100 milliGRAM(s) Oral every 6 hours PRN cough  haloperidol     Tablet 5 milliGRAM(s) Oral every 6 hours PRN agitation  haloperidol    Injectable 5 milliGRAM(s) IntraMuscular once PRN agitation  ibuprofen  Tablet. 600 milliGRAM(s) Oral every 8 hours PRN Severe Pain (7 - 10)  LORazepam     Tablet 2 milliGRAM(s) Oral every 6 hours PRN agitation  LORazepam   Injectable 2 milliGRAM(s) IntraMuscular once PRN agitation  
MEDICATIONS  (STANDING):  ARIPiprazole 20 milliGRAM(s) Oral daily  diVALproex  milliGRAM(s) Oral two times a day    MEDICATIONS  (PRN):  diphenhydrAMINE 50 milliGRAM(s) Oral every 4 hours PRN agitation / eps  diphenhydrAMINE Injectable 50 milliGRAM(s) IntraMuscular once PRN agitation / eps  haloperidol     Tablet 5 milliGRAM(s) Oral every 6 hours PRN agitation  haloperidol    Injectable 5 milliGRAM(s) IntraMuscular once PRN agitation  LORazepam     Tablet 2 milliGRAM(s) Oral every 6 hours PRN agitation  LORazepam   Injectable 2 milliGRAM(s) IntraMuscular once PRN agitation  
MEDICATIONS  (STANDING):  ARIPiprazole 20 milliGRAM(s) Oral daily  clonazePAM Oral Disintegrating Tablet 1 milliGRAM(s) Oral at bedtime  lidocaine   4% Patch 1 Patch Transdermal daily  valproic  acid Syrup 750 milliGRAM(s) Oral daily  valproic  acid Syrup 1000 milliGRAM(s) Oral at bedtime    MEDICATIONS  (PRN):  acetaminophen     Tablet .. 650 milliGRAM(s) Oral every 6 hours PRN Mild Pain (1 - 3), Moderate Pain (4 - 6)  diphenhydrAMINE 50 milliGRAM(s) Oral every 4 hours PRN agitation / eps  diphenhydrAMINE Injectable 50 milliGRAM(s) IntraMuscular once PRN agitation / eps  haloperidol     Tablet 5 milliGRAM(s) Oral every 6 hours PRN agitation  haloperidol    Injectable 5 milliGRAM(s) IntraMuscular once PRN agitation  ibuprofen  Tablet. 600 milliGRAM(s) Oral every 8 hours PRN Severe Pain (7 - 10)  LORazepam     Tablet 2 milliGRAM(s) Oral every 6 hours PRN agitation  LORazepam   Injectable 2 milliGRAM(s) IntraMuscular once PRN agitation  
MEDICATIONS  (STANDING):  ARIPiprazole 20 milliGRAM(s) Oral daily  diVALproex  milliGRAM(s) Oral daily  diVALproex DR 1000 milliGRAM(s) Oral at bedtime  melatonin. 5 milliGRAM(s) Oral at bedtime    MEDICATIONS  (PRN):  acetaminophen     Tablet .. 650 milliGRAM(s) Oral every 6 hours PRN Mild Pain (1 - 3), Moderate Pain (4 - 6)  diphenhydrAMINE 50 milliGRAM(s) Oral every 4 hours PRN agitation / eps  diphenhydrAMINE Injectable 50 milliGRAM(s) IntraMuscular once PRN agitation / eps  haloperidol     Tablet 5 milliGRAM(s) Oral every 6 hours PRN agitation  haloperidol    Injectable 5 milliGRAM(s) IntraMuscular once PRN agitation  LORazepam     Tablet 2 milliGRAM(s) Oral every 6 hours PRN agitation  LORazepam   Injectable 2 milliGRAM(s) IntraMuscular once PRN agitation  
MEDICATIONS  (STANDING):  ARIPiprazole 20 milliGRAM(s) Oral daily  diVALproex  milliGRAM(s) Oral two times a day    MEDICATIONS  (PRN):  acetaminophen     Tablet .. 650 milliGRAM(s) Oral every 6 hours PRN Mild Pain (1 - 3), Moderate Pain (4 - 6)  diphenhydrAMINE 50 milliGRAM(s) Oral every 4 hours PRN agitation / eps  diphenhydrAMINE Injectable 50 milliGRAM(s) IntraMuscular once PRN agitation / eps  haloperidol     Tablet 5 milliGRAM(s) Oral every 6 hours PRN agitation  haloperidol    Injectable 5 milliGRAM(s) IntraMuscular once PRN agitation  LORazepam     Tablet 2 milliGRAM(s) Oral every 6 hours PRN agitation  LORazepam   Injectable 2 milliGRAM(s) IntraMuscular once PRN agitation  
MEDICATIONS  (STANDING):  ARIPiprazole 20 milliGRAM(s) Oral daily  clonazePAM Oral Disintegrating Tablet 1 milliGRAM(s) Oral at bedtime  lidocaine   4% Patch 1 Patch Transdermal daily  valproic  acid Syrup 750 milliGRAM(s) Oral daily  valproic  acid Syrup 1000 milliGRAM(s) Oral at bedtime    MEDICATIONS  (PRN):  acetaminophen     Tablet .. 650 milliGRAM(s) Oral every 6 hours PRN Mild Pain (1 - 3), Moderate Pain (4 - 6)  diphenhydrAMINE 50 milliGRAM(s) Oral every 4 hours PRN agitation / eps  diphenhydrAMINE Injectable 50 milliGRAM(s) IntraMuscular once PRN agitation / eps  haloperidol     Tablet 5 milliGRAM(s) Oral every 6 hours PRN agitation  haloperidol    Injectable 5 milliGRAM(s) IntraMuscular once PRN agitation  ibuprofen  Tablet. 600 milliGRAM(s) Oral every 8 hours PRN Severe Pain (7 - 10)  LORazepam     Tablet 2 milliGRAM(s) Oral every 6 hours PRN agitation  LORazepam   Injectable 2 milliGRAM(s) IntraMuscular once PRN agitation  
MEDICATIONS  (STANDING):  ARIPiprazole 20 milliGRAM(s) Oral daily  clonazePAM Oral Disintegrating Tablet 1 milliGRAM(s) Oral at bedtime  lidocaine   4% Patch 1 Patch Transdermal daily  valproic  acid Syrup 750 milliGRAM(s) Oral daily  valproic  acid Syrup 1000 milliGRAM(s) Oral at bedtime    MEDICATIONS  (PRN):  acetaminophen     Tablet .. 650 milliGRAM(s) Oral every 6 hours PRN Mild Pain (1 - 3), Moderate Pain (4 - 6)  benzocaine 15 mG/menthol 3.6 mG Lozenge 1 Lozenge Oral every 4 hours PRN sore throat  diphenhydrAMINE 50 milliGRAM(s) Oral every 4 hours PRN agitation / eps  diphenhydrAMINE Injectable 50 milliGRAM(s) IntraMuscular once PRN agitation / eps  haloperidol     Tablet 5 milliGRAM(s) Oral every 6 hours PRN agitation  haloperidol    Injectable 5 milliGRAM(s) IntraMuscular once PRN agitation  ibuprofen  Tablet. 600 milliGRAM(s) Oral every 8 hours PRN Severe Pain (7 - 10)  LORazepam     Tablet 2 milliGRAM(s) Oral every 6 hours PRN agitation  LORazepam   Injectable 2 milliGRAM(s) IntraMuscular once PRN agitation  
MEDICATIONS  (STANDING):  ARIPiprazole 20 milliGRAM(s) Oral daily  diVALproex DR 1000 milliGRAM(s) Oral at bedtime  diVALproex  milliGRAM(s) Oral daily  lidocaine   4% Patch 1 Patch Transdermal daily  LORazepam     Tablet 2 milliGRAM(s) Oral at bedtime    MEDICATIONS  (PRN):  acetaminophen     Tablet .. 650 milliGRAM(s) Oral every 6 hours PRN Mild Pain (1 - 3), Moderate Pain (4 - 6)  diphenhydrAMINE 50 milliGRAM(s) Oral every 4 hours PRN agitation / eps  diphenhydrAMINE Injectable 50 milliGRAM(s) IntraMuscular once PRN agitation / eps  haloperidol     Tablet 5 milliGRAM(s) Oral every 6 hours PRN agitation  haloperidol    Injectable 5 milliGRAM(s) IntraMuscular once PRN agitation  LORazepam     Tablet 2 milliGRAM(s) Oral every 6 hours PRN agitation  LORazepam   Injectable 2 milliGRAM(s) IntraMuscular once PRN agitation  
MEDICATIONS  (STANDING):  ARIPiprazole 20 milliGRAM(s) Oral daily  ARIPiprazole Injectable, Long Acting. (ABILIFY MAINTENA) 400 milliGRAM(s) IntraMuscular once  diVALproex  milliGRAM(s) Oral daily  diVALproex DR 1000 milliGRAM(s) Oral at bedtime  lidocaine   4% Patch 1 Patch Transdermal daily  melatonin. 5 milliGRAM(s) Oral at bedtime    MEDICATIONS  (PRN):  acetaminophen     Tablet .. 650 milliGRAM(s) Oral every 6 hours PRN Mild Pain (1 - 3), Moderate Pain (4 - 6)  diphenhydrAMINE 50 milliGRAM(s) Oral every 4 hours PRN agitation / eps  diphenhydrAMINE Injectable 50 milliGRAM(s) IntraMuscular once PRN agitation / eps  haloperidol     Tablet 5 milliGRAM(s) Oral every 6 hours PRN agitation  haloperidol    Injectable 5 milliGRAM(s) IntraMuscular once PRN agitation  LORazepam     Tablet 2 milliGRAM(s) Oral every 6 hours PRN agitation  LORazepam   Injectable 2 milliGRAM(s) IntraMuscular once PRN agitation

## 2022-09-22 NOTE — SOCIAL WORK POST DISCHARGE FOLLOW UP NOTE - NSBHSWFOLLOWUP_PSY_ALL_CORE_FT
Lmsw called pt's cell [phone] to offer assistance in rescheduling missed follow up appointment. Lmsw attempted twice however pt's voicemail box is full and lmsw is unable to leave voice message. at the time of discharge pt was deemed psychiatrically stable by the unit MD. this case is closed.

## 2022-10-14 ENCOUNTER — APPOINTMENT (OUTPATIENT)
Dept: ELECTROPHYSIOLOGY | Facility: CLINIC | Age: 52
End: 2022-10-14

## 2022-10-14 DIAGNOSIS — F10.10 ALCOHOL ABUSE, UNCOMPLICATED: ICD-10-CM

## 2022-10-14 DIAGNOSIS — F31.9 BIPOLAR DISORDER, UNSPECIFIED: ICD-10-CM

## 2022-10-14 PROCEDURE — 93285 PRGRMG DEV EVAL SCRMS IP: CPT

## 2022-10-14 RX ORDER — DIVALPROEX SODIUM 250 MG/1
250 TABLET, DELAYED RELEASE ORAL
Refills: 0 | Status: ACTIVE | COMMUNITY

## 2022-10-14 RX ORDER — ARIPIPRAZOLE 9.75 MG/1.3ML
INJECTION, SOLUTION INTRAMUSCULAR
Refills: 0 | Status: ACTIVE | COMMUNITY

## 2022-10-14 RX ORDER — DIVALPROEX SODIUM 500 MG/1
500 TABLET, EXTENDED RELEASE ORAL AT BEDTIME
Refills: 0 | Status: ACTIVE | COMMUNITY

## 2022-10-14 RX ORDER — QUETIAPINE 400 MG/1
TABLET, FILM COATED ORAL
Refills: 0 | Status: ACTIVE | COMMUNITY

## 2022-10-27 ENCOUNTER — NON-APPOINTMENT (OUTPATIENT)
Age: 52
End: 2022-10-27

## 2022-11-08 NOTE — DISCHARGE NOTE PROVIDER - NSDCCPGOAL_GEN_ALL_CORE_FT
To get better and follow your care plan as instructed. Hydroxychloroquine Pregnancy And Lactation Text: This medication has been shown to cause fetal harm but it isn't assigned a Pregnancy Risk Category. There are small amounts excreted in breast milk.

## 2022-11-18 ENCOUNTER — NON-APPOINTMENT (OUTPATIENT)
Age: 52
End: 2022-11-18

## 2022-11-18 ENCOUNTER — APPOINTMENT (OUTPATIENT)
Dept: ELECTROPHYSIOLOGY | Facility: CLINIC | Age: 52
End: 2022-11-18

## 2022-11-18 PROCEDURE — 93298 REM INTERROG DEV EVAL SCRMS: CPT

## 2022-11-18 PROCEDURE — G2066: CPT

## 2022-12-01 NOTE — ED ADULT NURSE NOTE - BREATHING, MLM
[facility], [place]. [address] Hospitalpa[facility] [address] [facility]  [phone] office  [phone] fax      OFFICE VISIT:  2022    Liv Hobson - : 1951  Reason For Visit:  [place] is a 66 y.o. female who is here for 6 Month Follow-Up, Coronary Artery Disease, and Hypertension (And pacemaker check. Patient denies any cardiac symptoms. Will be getting labs at Dr. Palma Confer office. )    History:   Diagnosis Orders   1. Coronary artery disease involving native coronary artery of native heart without angina pectoris        2. Mixed hyperlipidemia  rosuvastatin (CRESTOR) 10 MG tablet      3. History of coronary artery stent placement        4. PAF (paroxysmal atrial fibrillation) ([address]        5. S/P CABG x 4        6. Pacemaker        7. SA node dysfunction ([address]        8. Essential hypertension          The patient presents today for cardiology follow up. [place] reports doing very well currently. She reports no angina. The patient continues Eliquis with no bleeding issues. The patient denies symptoms to suggest myocardial ischemia, heart failure or sustained arrhythmia. BP is well controlled on current regimen. The patient's PCP monitors cholesterol. Mohini West denies exertional chest pain, shortness of breath, orthopnea, paroxysmal nocturnal dyspnea, syncope, presyncope, sensed arrhythmia, edema and fatigue. The patient denies numbness or weakness to suggest cerebrovascular accident or transient ischemic attack.       Liv Hobson has the following history as recorded in [facility]:  Patient Active Problem List   Diagnosis Code    Deep vein thrombosis ([address] I82.409    Essential hypertension I10    Diabetes mellitus ([address] E11.9    Hypercholesteremia E78.00    S/P CABG x 4 Z95.1    History of coronary artery stent placement Z95.5    Coronary artery disease involving native coronary artery of native heart without angina pectoris I25.10    Mixed hyperlipidemia E78.2    History of diabetes mellitus Z86.39    Chronic obstructive pulmonary disease (formerly [facility]) J44.9    NUNEZ (dyspnea on exertion) R06.09    Abdominal aortic aneurysm (AAA) without rupture I71.40    Diabetic ulcer of left lower leg associated with type 2 diabetes mellitus, with fat layer exposed ([facility] E11.622, U68.275    Smoker F17.200    Traumatic hematoma T14. 8XXA    NICM (nonischemic cardiomyopathy) (formerly [facility]) I42.8    Hyponatremia E87.1    Acute on chronic respiratory failure with hypercapnia (formerly [facility]) J96.22    Palliative care patient Z51.5    Leg weakness, bilateral R29.898    COPD exacerbation (formerly [facility]) J44.1    COPD with acute exacerbation (formerly [facility]) J44.1    Acute on chronic respiratory failure with hypoxia and hypercapnia (formerly [facility]) J96.21, J96.22     Past Medical History:   Diagnosis Date    ASHD (arteriosclerotic heart disease)     s/p PTCA and stent of circumflex, as well as intermediate and mid LAD     COPD (chronic obstructive pulmonary disease) ([facility]     Coronary atherosclerosis     Diabetes mellitus ([facility]     diet controlled, type 2    Encounter for wound care     FOR LLL WOUND    Fall 10/29/2020    Ganglion cyst     RT HAND    Hematoma 10/2020    hematoma LLL from fall injury    Hx of blood clots     Hypercholesteremia     Hypertension     Pacemaker 03/02/2021    Palliative care patient 03/16/2022    Unstable angina [facility])      Past Surgical History:   Procedure Laterality Date    CARDIAC CATHETERIZATION  12/10/00    selective left heart and coronary arteriography with left ventriculography     CARDIAC CATHETERIZATION  01/23/98    left heart cath, left ventriculography, slective coronary arteriography, direct infarct angioplasty and stent placement to proximal left anterior descending coronary artery    CARDIAC CATHETERIZATION  03/05/94    left heart cath, selective coronary arteriography, left ventriculography    CARDIAC CATHETERIZATION  8/27/2011    [place]    CHOLECYSTECTOMY CORONARY ANGIOPLASTY WITH STENT PLACEMENT  00    PTCA and stent placement to the mid LAD/ptca and stent placement first circumflex marginal (intermediate)     CORONARY ANGIOPLASTY WITH STENT PLACEMENT  2021    CORONARY ARTERY BYPASS GRAFT  2011    PACABG X 4 LIMA-LAD, SVG-DIAG, SVG-PDA, RT EVH, LT OPEN VEIN HARVEST, DR Kaleb Gutierrez    CYST REMOVAL      GANGLION CYST REMOVED RT HAND    HYSTERECTOMY (CERVIX STATUS UNKNOWN)      NECK SURGERY      parotid artery tumor removed bilaterally    PACEMAKER PLACEMENT      PAROTIDECTOMY Bilateral      Family History   Problem Relation Age of Onset    Cancer Mother     Coronary Art Dis Father     Mult Sclerosis Sister     Cancer Brother      Social History     Tobacco Use    Smoking status: Some Days     Packs/day: 0.25     Types: Cigarettes     Last attempt to quit: 3/29/2021     Years since quittin.6    Smokeless tobacco: Never    Tobacco comments:     1/2 PACKS EVERY 2 WEEKS, states has been decreasing amount    Substance Use Topics    Alcohol use: Never      Current Outpatient Medications   Medication Sig Dispense Refill    ramipril (ALTACE) 5 MG capsule TAKE 1 CAPSULE TWICE DAILY 180 capsule 3    sotalol (BETAPACE) 120 MG tablet TAKE 1 TABLET TWICE A DAY  *DOSE INCREASE* 180 tablet 3    apixaban (ELIQUIS) 5 MG TABS tablet Take 1 tablet by mouth 2 times daily 180 tablet 3    rosuvastatin (CRESTOR) 10 MG tablet TAKE 1 TABLET DAILY 90 tablet 3    clopidogrel (PLAVIX) 75 MG tablet Take 1 tablet by mouth daily 90 tablet 3    furosemide (LASIX) 40 MG tablet Take 1 tablet by mouth daily 90 tablet 3    metFORMIN (GLUCOPHAGE) 500 MG tablet Take 500 mg by mouth 2 times daily (with meals)      potassium chloride (KLOR-CON) 20 MEQ packet Take 20 mEq by mouth daily      ipratropium-albuterol (DUONEB) 0.5-2.5 (3) MG/3ML SOLN nebulizer solution Inhale 3 mLs into the lungs every 4 hours (while awake) 360 mL 0    nitroGLYCERIN (NITROSTAT) 0.4 MG SL tablet Place 1 tablet under the tongue every 5 minutes as needed for Chest pain 25 tablet 3     No current facility-administered medications for this visit. Allergies: Codeine    Review of Systems  Constitutional - no appetite change, or unexpected weight change. No fever, chills or diaphoresis. No significant change in activity level or new onset of fatigue. HEENT - no significant rhinorrhea or epistaxis. No tinnitus or significant hearing loss. Eyes - no sudden vision change or amaurosis. No corneal arcus, xantholasma, subconjunctival hemorrhage or discharge. Respiratory - no significant wheezing, stridor, apnea or cough. No dyspnea on exertion or shortness of air. Cardiovascular - no exertional chest pain to suggest myocardial ischemia. No orthopnea or PND. No sensation of sustained arrythmia. No occurrence of slow heart rate. No palpitations. No claudication. Gastrointestinal - no abdominal swelling or pain. No blood in stool. No severe constipation, diarrhea, nausea, or vomiting. Genitourinary - no dysuria, frequency, or urgency. No flank pain or hematuria. Musculoskeletal - no back pain or myalgia. No problems with gait. Extremities - no clubbing, cyanosis or extremity edema. Skin - no color change or rash. No pallor. No new surgical incision. Neurologic - no speech difficulty, facial asymmetry or lateralizing weakness. No seizures, presyncope or syncope. No significant dizziness. Hematologic - no easy bruising or excessive bleeding. Psychiatric - no severe anxiety or insomnia. No confusion. All other review of systems are negative. Objective  Vital Signs - /60   Pulse 80   Ht 5' 6\" (1.676 m)   Wt 153 lb (69.4 kg)   SpO2 100%   BMI 24.69 kg/m²   General - Lorretta Bodily is alert, cooperative, and pleasant. Well groomed. No acute distress. Body habitus - Body mass index is 24.69 kg/m². HEENT - Head is normocephalic. No circumoral cyanosis.   Dentition is normal.  EYES -   Lids normal without ptosis. No discharge, edema or subconjunctival hemorrhage. Neck - Symmetrical without apparent mass or lymphadenopathy. Respiratory - Normal respiratory effort without use of accessory muscles. Ausculatation reveals vesicular breath sounds without crackles,rub or rhonchi. Scattered expiratory wheezing noted. Patient currently on oxygen therapy which she uses prn. Cardiovascular - No jugular venous distention. Auscultation reveals regular rate and rhythm. No audible clicks, gallop or rub. No murmur. No lower extremity varicosities. No carotid bruits. Abdominal -  No visible distention, mass or pulsations. Extremities - No clubbing or cyanosis. No statis dermatitis or ulcers. No edema. Musculoskeletal -   No Osler's nodes. No kyphosis or scoliosis. Gait is even and regular without limp or shuffle. Ambulates without assistance. Skin -  Warm and dry; no rash or pallor. No new surgical wound. Neurological - No focal neurological deficits. Thought processes coherent. No apparent tremor. Oriented to person, place and time. Psychiatric -  Appropriate affect and mood. Data reviewed:  11/28/22   [facility] remote pacemaker interrogation   Presenting rhythm: AP VS, AP 74.2%,  0.2%   Battery status: 11.1 years   Lead status: lead impedance within range and stable   Sensing: P waves 5.5 mV,  R waves 16.8 mV   Thresholds:  Atrial 0.875 V @ 0.4ms, Ventricular 2.125 V @ 0.4ms   Observations:  1 monitored NSVT 8 beats on 11/21/22   Next office visit:  12/1/22   [facility] home check scheduled for 12/1/22 6/24/21 JAME    LV is normal in size with normal systolic function. LV ejection fraction   estimated at 60%. No mass or thrombus in left ventricle. RV appears mildly dilated with preserved RV systolic function. No mass or  thrombus noted in right ventricle. Left atrium appears mildly dilated. No mass or thrombus noted in the left  atrium.    Left atrial appendage with no mass or thrombus noted. Good velocity noted  in appendage. Right atrium appears moderate to severely dilated. No mass or thrombus  noted. Coronary sinus appears dilated. Aortic valve is trileaflet with mild leaflet thickening but preserved   mobility. No significant stenosis or regurgitation. Mitral valve with posterior leaflet thickened and calcified and appears  fixed. Normal anterior leaflet mobility. Probable mild mitral stenosis  (mean gradient 3 mmHg). Mild mitral regurgitation. Tricuspid leaflets appear structurally normal. Moderate tricuspid   regurgitation. Pulmonic valve not well visualized but no significant regurgitation   appreciated   Intra-atrial septum is aneurysmal and thinned. A slitlike patent foramen ovale is present. Right to left shunting is noted on bubble study. Pacer wires noted in right-sided chambers. Ascending aorta is mildly dilated at 3.3 cm. Mild to moderate descending thoracic aortic atheromatous changes. Electronically signed by Yogesh Rivas MD(Interpreting physician)   on 06/25/2021 12:22 AM    8/24/20 cath - Dr. Deonte Monsivais disease with severe vessel ectasia in RCA, stenotic disease  in LAD. Severely stenotic stent in mid LAD. Occluded LIMA to mid LAD. Patent SVG to 1st diagonal.   Patent SVG to OM1. Patent SVG to RPDA. Normal LV ejection fraction. Large calcified abdominal aortic aneurysm with large mural thrombus. Successful PCI to proximal to mid LAD (4.0 x 15 mm resolute integrity) utilizing drug-eluting stent. Successful PCI to mid LAD (2.75 x 26 mm resolute integrity taken to 3.0  mm) utilizing drug-eluting stent. Recommendations    Medical management. Smoking cessation. Vascular surgical consult for large abdominal aortic aneurysm.    Electronically signed by Yogesh Rivas MD(Performing Physician) on   08/25/2020 00:28    Lab Results   Component Value Date    WBC 6.7 10/24/2022    HGB 12.6 10/24/2022    HCT 39.0 10/24/2022    MCV 94.2 10/24/2022     10/24/2022     Lab Results   Component Value Date     (L) 10/26/2022    K 4.4 10/26/2022    CL 92 (L) 10/26/2022    CO2 33 (H) 10/26/2022    BUN 26 (H) 10/26/2022    CREATININE 0.9 10/26/2022    GLUCOSE 199 (H) 10/26/2022    CALCIUM 9.7 10/26/2022    PROT 7.0 10/23/2022    LABALBU 4.1 10/23/2022    BILITOT 0.3 10/23/2022    ALKPHOS 71 10/23/2022    AST 16 10/23/2022    ALT 12 10/23/2022    LABGLOM >60 10/26/2022    GFRAA >59 09/02/2022     Lab Results   Component Value Date    CHOL 133 (L) 08/24/2020    CHOL 206 (H) 11/13/2017    CHOL 149 03/30/2014     Lab Results   Component Value Date    TRIG 145 08/24/2020    TRIG 201 (H) 11/13/2017    TRIG 105 03/30/2014     Lab Results   Component Value Date    HDL 43 (L) 08/24/2020    HDL 51 (L) 11/13/2017    HDL 56 03/30/2014     Lab Results   Component Value Date    LDLCALC 61 08/24/2020    LDLCALC 115 11/13/2017    LDLCALC 60 08/23/2011     BP Readings from Last 3 Encounters:   12/01/22 110/60   10/26/22 (!) 156/96   09/03/22 124/80    Pulse Readings from Last 3 Encounters:   12/01/22 80   10/26/22 75   09/03/22 77        Wt Readings from Last 3 Encounters:   12/01/22 153 lb (69.4 kg)   10/23/22 155 lb (70.3 kg)   09/03/22 161 lb 4 oz (73.1 kg)     Assessment/Plan:   Diagnosis Orders   1. Coronary artery disease involving native coronary artery of native heart without angina pectoris        2. Mixed hyperlipidemia  rosuvastatin (CRESTOR) 10 MG tablet      3. History of coronary artery stent placement        4. PAF (paroxysmal atrial fibrillation) ([address]        5. S/P CABG x 4        6. Pacemaker        7. SA node dysfunction ([address]        8. Essential hypertension          CAD s/p CABG - stable on current medical management. Continue same. PAF - continues on Sotalol 120 mg BID and Eliquis. No bleeding issues reported. AF burden on 11/28/22 [facility] was less than 0.1%. HTN - normotensive on current regimen.   BP today 110/60. Continue same. Hyperlipidemia - monitored and managed by PCP. Continues on Crestor 10 mg daily. LDL 61. Patient is compliant with medication regimen. Previous cardiac history and records reviewed. Continue current medical management for cardiac related condition. Continue other current medications as directed. Continue to follow up with primary care provider for non cardiac medical problems. If your primary care provider is outside of the [facility] please request that your labs be faxed to this office at [phone]. BP goal 130/80 or less. Call the office with any problems, questions or concerns at [phone]. Cardiology follow up as scheduled in [address] appointments. Educational included in patient instructions. Heart health.       JAKOB Burris Spontaneous, unlabored and symmetrical

## 2022-12-23 ENCOUNTER — NON-APPOINTMENT (OUTPATIENT)
Age: 52
End: 2022-12-23

## 2022-12-23 ENCOUNTER — APPOINTMENT (OUTPATIENT)
Dept: ELECTROPHYSIOLOGY | Facility: CLINIC | Age: 52
End: 2022-12-23

## 2022-12-23 PROCEDURE — G2066: CPT

## 2022-12-23 PROCEDURE — 93298 REM INTERROG DEV EVAL SCRMS: CPT

## 2023-01-27 ENCOUNTER — APPOINTMENT (OUTPATIENT)
Dept: ELECTROPHYSIOLOGY | Facility: CLINIC | Age: 53
End: 2023-01-27
Payer: COMMERCIAL

## 2023-01-27 ENCOUNTER — NON-APPOINTMENT (OUTPATIENT)
Age: 53
End: 2023-01-27

## 2023-01-27 PROCEDURE — G2066: CPT

## 2023-01-27 PROCEDURE — 93298 REM INTERROG DEV EVAL SCRMS: CPT

## 2023-03-03 ENCOUNTER — NON-APPOINTMENT (OUTPATIENT)
Age: 53
End: 2023-03-03

## 2023-03-03 ENCOUNTER — APPOINTMENT (OUTPATIENT)
Dept: ELECTROPHYSIOLOGY | Facility: CLINIC | Age: 53
End: 2023-03-03
Payer: COMMERCIAL

## 2023-03-03 PROCEDURE — G2066: CPT

## 2023-03-03 PROCEDURE — 93298 REM INTERROG DEV EVAL SCRMS: CPT

## 2023-03-09 NOTE — ED ADULT NURSE NOTE - CAS EDN DISCHARGE ASSESSMENT
PAST SURGICAL HISTORY:  Amputated great toe of right foot     ORIF right lower leg- 1995     S/P arteriovenous (AV) fistula creation 7/2019     Alert and oriented to person, place and time

## 2023-03-27 ENCOUNTER — EMERGENCY (EMERGENCY)
Facility: HOSPITAL | Age: 53
LOS: 1 days | Discharge: HOME | End: 2023-03-27
Admitting: EMERGENCY MEDICINE
Payer: COMMERCIAL

## 2023-03-27 VITALS
DIASTOLIC BLOOD PRESSURE: 68 MMHG | HEART RATE: 59 BPM | SYSTOLIC BLOOD PRESSURE: 111 MMHG | RESPIRATION RATE: 18 BRPM | OXYGEN SATURATION: 100 %

## 2023-03-27 PROCEDURE — 99285 EMERGENCY DEPT VISIT HI MDM: CPT

## 2023-03-27 NOTE — ED PROVIDER NOTE - CADM POA PRESS ULCER
Clinic with Kandace Guadalupe           No

## 2023-03-30 NOTE — DOWNTIME INTERRUPTION NOTE - WHICH MANUAL FORMS INITIATED?
all    NK- See paper chart for documentation, I am not the provider for this patient, my role is in downtime documentation.

## 2023-03-30 NOTE — ED PROVIDER NOTE - PATIENT PORTAL LINK FT
You can access the FollowMyHealth Patient Portal offered by [facility] by registering at the following website: [email or web address]. By joining [employer]’s FollowMyHealth portal, you will also be able to view your health information using other applications (apps) compatible with our system.

## 2023-03-30 NOTE — ED PROVIDER NOTE - NSICDXFAMILYHX_GEN_ALL_CORE_FT
FAMILY HISTORY:  Father  Still living? No  FH: Alzheimers disease, Age at diagnosis: Age Unknown    Sibling  Still living? Unknown  FH: depression, Age at diagnosis: Age Unknown

## 2023-04-06 ENCOUNTER — FORM ENCOUNTER (OUTPATIENT)
Age: 53
End: 2023-04-06

## 2023-04-07 ENCOUNTER — APPOINTMENT (OUTPATIENT)
Dept: ELECTROPHYSIOLOGY | Facility: CLINIC | Age: 53
End: 2023-04-07

## 2023-04-13 PROCEDURE — 98968 PH1 ASSMT&MGMT NQHP 21-30: CPT

## 2023-05-11 ENCOUNTER — FORM ENCOUNTER (OUTPATIENT)
Age: 53
End: 2023-05-11

## 2023-05-11 ENCOUNTER — APPOINTMENT (OUTPATIENT)
Dept: ELECTROPHYSIOLOGY | Facility: CLINIC | Age: 53
End: 2023-05-11

## 2023-05-25 NOTE — H&P ADULT - PROBLEM SELECTOR PLAN 1
- Unclear etiology of syncope, patient with recurrent episodes of syncope, likely cardiac in nature   - EKG not available in chart, will obtain   - previous EKG NSR with incomplete RBBB  - telemetry showing NSR rate of 60  - etiologies include: arrhythmia, cardiac structural cause, orthostasis  - CTH negative  - orthostatic vitals   - f/u TTE  - c/w telemetry monitoring.
182.88

## 2023-06-16 ENCOUNTER — NON-APPOINTMENT (OUTPATIENT)
Age: 53
End: 2023-06-16

## 2023-06-16 ENCOUNTER — APPOINTMENT (OUTPATIENT)
Dept: ELECTROPHYSIOLOGY | Facility: CLINIC | Age: 53
End: 2023-06-16
Payer: COMMERCIAL

## 2023-06-16 PROCEDURE — 93298 REM INTERROG DEV EVAL SCRMS: CPT

## 2023-06-16 PROCEDURE — G2066: CPT

## 2023-07-20 ENCOUNTER — APPOINTMENT (OUTPATIENT)
Dept: ELECTROPHYSIOLOGY | Facility: CLINIC | Age: 53
End: 2023-07-20
Payer: COMMERCIAL

## 2023-07-20 ENCOUNTER — NON-APPOINTMENT (OUTPATIENT)
Age: 53
End: 2023-07-20

## 2023-07-20 PROCEDURE — 93298 REM INTERROG DEV EVAL SCRMS: CPT

## 2023-07-20 PROCEDURE — G2066: CPT

## 2023-08-23 ENCOUNTER — NON-APPOINTMENT (OUTPATIENT)
Age: 53
End: 2023-08-23

## 2023-08-24 ENCOUNTER — APPOINTMENT (OUTPATIENT)
Dept: ELECTROPHYSIOLOGY | Facility: CLINIC | Age: 53
End: 2023-08-24
Payer: COMMERCIAL

## 2023-08-24 PROCEDURE — 93298 REM INTERROG DEV EVAL SCRMS: CPT

## 2023-08-24 PROCEDURE — G2066: CPT

## 2023-10-13 ENCOUNTER — APPOINTMENT (OUTPATIENT)
Dept: ELECTROPHYSIOLOGY | Facility: CLINIC | Age: 53
End: 2023-10-13

## 2023-10-26 ENCOUNTER — INPATIENT (INPATIENT)
Facility: HOSPITAL | Age: 53
LOS: 3 days | Discharge: HOME | End: 2023-10-30
Attending: STUDENT IN AN ORGANIZED HEALTH CARE EDUCATION/TRAINING PROGRAM | Admitting: STUDENT IN AN ORGANIZED HEALTH CARE EDUCATION/TRAINING PROGRAM
Payer: COMMERCIAL

## 2023-10-26 VITALS
DIASTOLIC BLOOD PRESSURE: 69 MMHG | RESPIRATION RATE: 16 BRPM | SYSTOLIC BLOOD PRESSURE: 107 MMHG | TEMPERATURE: 98 F | HEART RATE: 54 BPM | OXYGEN SATURATION: 100 %

## 2023-10-26 PROCEDURE — 99285 EMERGENCY DEPT VISIT HI MDM: CPT

## 2023-10-26 NOTE — ED ADULT TRIAGE NOTE - CHIEF COMPLAINT QUOTE
Pt c/o multiple syncopal episodes. pt states he was sitting on the couch and when he stood up felt lightheaded and passed out. Pt family heard him fall. when he tried to get up he passed out again pt did this 3 times. Pt denies any blood thinner use. Pt states the back of his head hurts. Pt had this happen before and now has a loop recorder in place. finger stick with ems 108. No complaints of chest pain, headache, nausea, dizziness, vomiting  SOB, fever, chills verbalized.

## 2023-10-27 DIAGNOSIS — I10 ESSENTIAL (PRIMARY) HYPERTENSION: ICD-10-CM

## 2023-10-27 DIAGNOSIS — R00.1 BRADYCARDIA, UNSPECIFIED: ICD-10-CM

## 2023-10-27 DIAGNOSIS — R63.8 OTHER SYMPTOMS AND SIGNS CONCERNING FOOD AND FLUID INTAKE: ICD-10-CM

## 2023-10-27 DIAGNOSIS — R55 SYNCOPE AND COLLAPSE: ICD-10-CM

## 2023-10-27 DIAGNOSIS — D72.829 ELEVATED WHITE BLOOD CELL COUNT, UNSPECIFIED: ICD-10-CM

## 2023-10-27 DIAGNOSIS — R22.9 LOCALIZED SWELLING, MASS AND LUMP, UNSPECIFIED: ICD-10-CM

## 2023-10-27 DIAGNOSIS — F31.9 BIPOLAR DISORDER, UNSPECIFIED: ICD-10-CM

## 2023-10-27 LAB
ALBUMIN SERPL ELPH-MCNC: 3.9 G/DL — SIGNIFICANT CHANGE UP (ref 3.3–5)
ALBUMIN SERPL ELPH-MCNC: 3.9 G/DL — SIGNIFICANT CHANGE UP (ref 3.3–5)
ALP SERPL-CCNC: 64 U/L — SIGNIFICANT CHANGE UP (ref 40–120)
ALP SERPL-CCNC: 64 U/L — SIGNIFICANT CHANGE UP (ref 40–120)
ALT FLD-CCNC: 27 U/L — SIGNIFICANT CHANGE UP (ref 4–41)
ALT FLD-CCNC: 27 U/L — SIGNIFICANT CHANGE UP (ref 4–41)
ANION GAP SERPL CALC-SCNC: 8 MMOL/L — SIGNIFICANT CHANGE UP (ref 7–14)
ANION GAP SERPL CALC-SCNC: 8 MMOL/L — SIGNIFICANT CHANGE UP (ref 7–14)
AST SERPL-CCNC: 22 U/L — SIGNIFICANT CHANGE UP (ref 4–40)
AST SERPL-CCNC: 22 U/L — SIGNIFICANT CHANGE UP (ref 4–40)
BASOPHILS # BLD AUTO: 0.06 K/UL — SIGNIFICANT CHANGE UP (ref 0–0.2)
BASOPHILS # BLD AUTO: 0.06 K/UL — SIGNIFICANT CHANGE UP (ref 0–0.2)
BASOPHILS NFR BLD AUTO: 0.5 % — SIGNIFICANT CHANGE UP (ref 0–2)
BASOPHILS NFR BLD AUTO: 0.5 % — SIGNIFICANT CHANGE UP (ref 0–2)
BILIRUB SERPL-MCNC: <0.2 MG/DL — SIGNIFICANT CHANGE UP (ref 0.2–1.2)
BILIRUB SERPL-MCNC: <0.2 MG/DL — SIGNIFICANT CHANGE UP (ref 0.2–1.2)
BUN SERPL-MCNC: 17 MG/DL — SIGNIFICANT CHANGE UP (ref 7–23)
BUN SERPL-MCNC: 17 MG/DL — SIGNIFICANT CHANGE UP (ref 7–23)
CALCIUM SERPL-MCNC: 9.1 MG/DL — SIGNIFICANT CHANGE UP (ref 8.4–10.5)
CALCIUM SERPL-MCNC: 9.1 MG/DL — SIGNIFICANT CHANGE UP (ref 8.4–10.5)
CHLORIDE SERPL-SCNC: 105 MMOL/L — SIGNIFICANT CHANGE UP (ref 98–107)
CHLORIDE SERPL-SCNC: 105 MMOL/L — SIGNIFICANT CHANGE UP (ref 98–107)
CO2 SERPL-SCNC: 25 MMOL/L — SIGNIFICANT CHANGE UP (ref 22–31)
CO2 SERPL-SCNC: 25 MMOL/L — SIGNIFICANT CHANGE UP (ref 22–31)
CREAT SERPL-MCNC: 1.01 MG/DL — SIGNIFICANT CHANGE UP (ref 0.5–1.3)
CREAT SERPL-MCNC: 1.01 MG/DL — SIGNIFICANT CHANGE UP (ref 0.5–1.3)
EGFR: 88 ML/MIN/1.73M2 — SIGNIFICANT CHANGE UP
EGFR: 88 ML/MIN/1.73M2 — SIGNIFICANT CHANGE UP
EOSINOPHIL # BLD AUTO: 0.11 K/UL — SIGNIFICANT CHANGE UP (ref 0–0.5)
EOSINOPHIL # BLD AUTO: 0.11 K/UL — SIGNIFICANT CHANGE UP (ref 0–0.5)
EOSINOPHIL NFR BLD AUTO: 1 % — SIGNIFICANT CHANGE UP (ref 0–6)
EOSINOPHIL NFR BLD AUTO: 1 % — SIGNIFICANT CHANGE UP (ref 0–6)
GLUCOSE SERPL-MCNC: 121 MG/DL — HIGH (ref 70–99)
GLUCOSE SERPL-MCNC: 121 MG/DL — HIGH (ref 70–99)
HCT VFR BLD CALC: 40.7 % — SIGNIFICANT CHANGE UP (ref 39–50)
HCT VFR BLD CALC: 40.7 % — SIGNIFICANT CHANGE UP (ref 39–50)
HGB BLD-MCNC: 13.5 G/DL — SIGNIFICANT CHANGE UP (ref 13–17)
HGB BLD-MCNC: 13.5 G/DL — SIGNIFICANT CHANGE UP (ref 13–17)
IANC: 7.72 K/UL — HIGH (ref 1.8–7.4)
IANC: 7.72 K/UL — HIGH (ref 1.8–7.4)
IMM GRANULOCYTES NFR BLD AUTO: 0.4 % — SIGNIFICANT CHANGE UP (ref 0–0.9)
IMM GRANULOCYTES NFR BLD AUTO: 0.4 % — SIGNIFICANT CHANGE UP (ref 0–0.9)
LYMPHOCYTES # BLD AUTO: 2.62 K/UL — SIGNIFICANT CHANGE UP (ref 1–3.3)
LYMPHOCYTES # BLD AUTO: 2.62 K/UL — SIGNIFICANT CHANGE UP (ref 1–3.3)
LYMPHOCYTES # BLD AUTO: 22.9 % — SIGNIFICANT CHANGE UP (ref 13–44)
LYMPHOCYTES # BLD AUTO: 22.9 % — SIGNIFICANT CHANGE UP (ref 13–44)
MAGNESIUM SERPL-MCNC: 2 MG/DL — SIGNIFICANT CHANGE UP (ref 1.6–2.6)
MAGNESIUM SERPL-MCNC: 2 MG/DL — SIGNIFICANT CHANGE UP (ref 1.6–2.6)
MCHC RBC-ENTMCNC: 32.8 PG — SIGNIFICANT CHANGE UP (ref 27–34)
MCHC RBC-ENTMCNC: 32.8 PG — SIGNIFICANT CHANGE UP (ref 27–34)
MCHC RBC-ENTMCNC: 33.2 GM/DL — SIGNIFICANT CHANGE UP (ref 32–36)
MCHC RBC-ENTMCNC: 33.2 GM/DL — SIGNIFICANT CHANGE UP (ref 32–36)
MCV RBC AUTO: 99 FL — SIGNIFICANT CHANGE UP (ref 80–100)
MCV RBC AUTO: 99 FL — SIGNIFICANT CHANGE UP (ref 80–100)
MONOCYTES # BLD AUTO: 0.89 K/UL — SIGNIFICANT CHANGE UP (ref 0–0.9)
MONOCYTES # BLD AUTO: 0.89 K/UL — SIGNIFICANT CHANGE UP (ref 0–0.9)
MONOCYTES NFR BLD AUTO: 7.8 % — SIGNIFICANT CHANGE UP (ref 2–14)
MONOCYTES NFR BLD AUTO: 7.8 % — SIGNIFICANT CHANGE UP (ref 2–14)
NEUTROPHILS # BLD AUTO: 7.72 K/UL — HIGH (ref 1.8–7.4)
NEUTROPHILS # BLD AUTO: 7.72 K/UL — HIGH (ref 1.8–7.4)
NEUTROPHILS NFR BLD AUTO: 67.4 % — SIGNIFICANT CHANGE UP (ref 43–77)
NEUTROPHILS NFR BLD AUTO: 67.4 % — SIGNIFICANT CHANGE UP (ref 43–77)
NRBC # BLD: 0 /100 WBCS — SIGNIFICANT CHANGE UP (ref 0–0)
NRBC # BLD: 0 /100 WBCS — SIGNIFICANT CHANGE UP (ref 0–0)
NRBC # FLD: 0 K/UL — SIGNIFICANT CHANGE UP (ref 0–0)
NRBC # FLD: 0 K/UL — SIGNIFICANT CHANGE UP (ref 0–0)
PHOSPHATE SERPL-MCNC: 4 MG/DL — SIGNIFICANT CHANGE UP (ref 2.5–4.5)
PHOSPHATE SERPL-MCNC: 4 MG/DL — SIGNIFICANT CHANGE UP (ref 2.5–4.5)
PLATELET # BLD AUTO: 176 K/UL — SIGNIFICANT CHANGE UP (ref 150–400)
PLATELET # BLD AUTO: 176 K/UL — SIGNIFICANT CHANGE UP (ref 150–400)
POTASSIUM SERPL-MCNC: 4.1 MMOL/L — SIGNIFICANT CHANGE UP (ref 3.5–5.3)
POTASSIUM SERPL-MCNC: 4.1 MMOL/L — SIGNIFICANT CHANGE UP (ref 3.5–5.3)
POTASSIUM SERPL-SCNC: 4.1 MMOL/L — SIGNIFICANT CHANGE UP (ref 3.5–5.3)
POTASSIUM SERPL-SCNC: 4.1 MMOL/L — SIGNIFICANT CHANGE UP (ref 3.5–5.3)
PROT SERPL-MCNC: 6.2 G/DL — SIGNIFICANT CHANGE UP (ref 6–8.3)
PROT SERPL-MCNC: 6.2 G/DL — SIGNIFICANT CHANGE UP (ref 6–8.3)
RBC # BLD: 4.11 M/UL — LOW (ref 4.2–5.8)
RBC # BLD: 4.11 M/UL — LOW (ref 4.2–5.8)
RBC # FLD: 12.3 % — SIGNIFICANT CHANGE UP (ref 10.3–14.5)
RBC # FLD: 12.3 % — SIGNIFICANT CHANGE UP (ref 10.3–14.5)
SODIUM SERPL-SCNC: 138 MMOL/L — SIGNIFICANT CHANGE UP (ref 135–145)
SODIUM SERPL-SCNC: 138 MMOL/L — SIGNIFICANT CHANGE UP (ref 135–145)
TROPONIN T, HIGH SENSITIVITY RESULT: 9 NG/L — SIGNIFICANT CHANGE UP
WBC # BLD: 11.45 K/UL — HIGH (ref 3.8–10.5)
WBC # BLD: 11.45 K/UL — HIGH (ref 3.8–10.5)
WBC # FLD AUTO: 11.45 K/UL — HIGH (ref 3.8–10.5)
WBC # FLD AUTO: 11.45 K/UL — HIGH (ref 3.8–10.5)

## 2023-10-27 PROCEDURE — 93291 INTERROG DEV EVAL SCRMS IP: CPT | Mod: 26

## 2023-10-27 PROCEDURE — 99223 1ST HOSP IP/OBS HIGH 75: CPT

## 2023-10-27 PROCEDURE — 99233 SBSQ HOSP IP/OBS HIGH 50: CPT | Mod: 25

## 2023-10-27 PROCEDURE — 70450 CT HEAD/BRAIN W/O DYE: CPT | Mod: 26,MA

## 2023-10-27 RX ORDER — SODIUM CHLORIDE 9 MG/ML
1000 INJECTION INTRAMUSCULAR; INTRAVENOUS; SUBCUTANEOUS ONCE
Refills: 0 | Status: COMPLETED | OUTPATIENT
Start: 2023-10-27 | End: 2023-10-27

## 2023-10-27 RX ORDER — ENOXAPARIN SODIUM 100 MG/ML
40 INJECTION SUBCUTANEOUS EVERY 24 HOURS
Refills: 0 | Status: DISCONTINUED | OUTPATIENT
Start: 2023-10-27 | End: 2023-10-30

## 2023-10-27 RX ORDER — LANOLIN ALCOHOL/MO/W.PET/CERES
6 CREAM (GRAM) TOPICAL
Refills: 0 | DISCHARGE

## 2023-10-27 RX ORDER — LANOLIN ALCOHOL/MO/W.PET/CERES
6 CREAM (GRAM) TOPICAL AT BEDTIME
Refills: 0 | Status: DISCONTINUED | OUTPATIENT
Start: 2023-10-27 | End: 2023-10-30

## 2023-10-27 RX ORDER — SERTRALINE 25 MG/1
150 TABLET, FILM COATED ORAL DAILY
Refills: 0 | Status: DISCONTINUED | OUTPATIENT
Start: 2023-10-27 | End: 2023-10-30

## 2023-10-27 RX ORDER — ACETAMINOPHEN 500 MG
650 TABLET ORAL EVERY 6 HOURS
Refills: 0 | Status: DISCONTINUED | OUTPATIENT
Start: 2023-10-27 | End: 2023-10-30

## 2023-10-27 RX ORDER — QUETIAPINE FUMARATE 200 MG/1
5 TABLET, FILM COATED ORAL
Refills: 0 | DISCHARGE

## 2023-10-27 RX ORDER — QUETIAPINE FUMARATE 200 MG/1
250 TABLET, FILM COATED ORAL AT BEDTIME
Refills: 0 | Status: DISCONTINUED | OUTPATIENT
Start: 2023-10-27 | End: 2023-10-30

## 2023-10-27 RX ORDER — ARIPIPRAZOLE 15 MG/1
400 TABLET ORAL
Qty: 0 | Refills: 0 | DISCHARGE

## 2023-10-27 RX ORDER — SERTRALINE 25 MG/1
1 TABLET, FILM COATED ORAL
Refills: 0 | DISCHARGE

## 2023-10-27 RX ADMIN — QUETIAPINE FUMARATE 250 MILLIGRAM(S): 200 TABLET, FILM COATED ORAL at 21:34

## 2023-10-27 RX ADMIN — ENOXAPARIN SODIUM 40 MILLIGRAM(S): 100 INJECTION SUBCUTANEOUS at 18:29

## 2023-10-27 RX ADMIN — Medication 6 MILLIGRAM(S): at 21:35

## 2023-10-27 RX ADMIN — SODIUM CHLORIDE 1000 MILLILITER(S): 9 INJECTION INTRAMUSCULAR; INTRAVENOUS; SUBCUTANEOUS at 01:20

## 2023-10-27 NOTE — ED ADULT NURSE REASSESSMENT NOTE - SYMPTOMS
AS per handoff report pt with multiple syncopal episodes. c/o headpain does not recall hitting head. . Pt reminded to call for asst, discussed fall risk concerns with pt. Pt is close to RN , cm in place.

## 2023-10-27 NOTE — CONSULT NOTE ADULT - SUBJECTIVE AND OBJECTIVE BOX
Patient is a 53y old  Male who presents with a chief complaint of syncope (27 Oct 2023 16:47)          HPI:    53 year old male with a PMH of bipolar, depression, HTN, syncope s/p Medtronic loop recorder implanted on 8/19/2022 who presents with recurrent syncope (2-3 x ) on 10/26.  Interrogation of his loop recorder revealed 3 episodes of PAF with RVR from 176 to 261 bpm the longest lasted up to 7 hours at 5:29am.  No vinod or pauses events recorded.  Patient denies CP, SOB or palpitations.  EKG demonstrates SB 48 bpm.   TTE done in 2022 showed normal LVEF and normal LA.              PAST MEDICAL & SURGICAL HISTORY:  Bipolar disorder      HTN (hypertension)      Syncope  implantable loop recorder (Medtronic)      No significant past surgical history          MEDICATIONS  (STANDING):  enoxaparin Injectable 40 milliGRAM(s) SubCutaneous every 24 hours    MEDICATIONS  (PRN):  acetaminophen     Tablet .. 650 milliGRAM(s) Oral every 6 hours PRN Temp greater or equal to 38C (100.4F), Mild Pain (1 - 3)    Allergies    penicillins (Rash)  penicillin (Hives)    Intolerances      FAMILY HISTORY:  FH: depression (Sibling)    FH: Alzheimers disease (Father)        SOCIAL HISTORY:  Denies smoking; no   Alcohol  or  Drug abuse               REVIEW OF SYSTEMS:    CONSTITUTIONAL: No fever, weight loss, chills, shakes, or fatigue  EYES: No eye pain, visual disturbances, or discharge  ENMT:  No difficulty hearing, tinnitus, vertigo; No sinus or throat pain  NECK: No pain or stiffness  RESPIRATORY: No cough, wheezing, hemoptysis, or shortness of breath  CARDIOVASCULAR: No chest pain, dyspnea, palpitations, dizziness,  paroxysmal nocturnal dyspnea, orthopnea, or arm or leg swelling +syncope  GASTROINTESTINAL: No abdominal  or epigastric pain, nausea, vomiting, hematemesis, diarrhea, constipation, melena or bright red blood.  GENITOURINARY: No dysuria, nocturia, hematuria, or urinary incontinence  NEUROLOGICAL: No headaches, memory loss, slurred speech, limb weakness, loss of strength, numbness, or tremors  SKIN: No itching, burning, rashes, or lesions   LYMPH NODES: No enlarged glands  ENDOCRINE: No heat or cold intolerance, or hair loss  MUSCULOSKELETAL: No joint pain or swelling, muscle, back, or extremity pain  PSYCHIATRIC: No depression, anxiety, or difficulty sleeping  HEME/LYMPH: No easy bruising or bleeding gums  ALLERY AND IMMUNOLOGIC: No hives or rash.      Vital Signs Last 24 Hrs  T(C): 37.1 (27 Oct 2023 18:15), Max: 37.1 (27 Oct 2023 18:15)  T(F): 98.7 (27 Oct 2023 18:15), Max: 98.7 (27 Oct 2023 18:15)  HR: 57 (27 Oct 2023 18:15) (54 - 59)  BP: 104/65 (27 Oct 2023 18:15) (90/58 - 107/69)  BP(mean): 77 (27 Oct 2023 05:50) (77 - 77)  RR: 17 (27 Oct 2023 18:15) (16 - 17)  SpO2: 97% (27 Oct 2023 18:15) (97% - 100%)    Parameters below as of 27 Oct 2023 18:15  Patient On (Oxygen Delivery Method): room air        PHYSICAL EXAM:    GENERAL: In no apparent distress  HEAD:  Atraumatic, Normocephalic  NECK: Supple . No JVD or carotid bruit or thyroidmegaly.  Carotid pulse is 2+ bilaterally.  PULMONARY: Clear to auscultation and perfusion.  No rales, wheezing, or rhonchi bilaterally.  HEART: Regular rate and rhythm; No murmurs, rubs, or gallops. +ILR  ABDOMEN: Soft, Nontender, Nondistended; Bowel sounds present  EXTREMITIES: No clubbing, cyanosis, or edema  NEUROLOGICAL: Alert oriented to person, place and time.  Speech clear.  Skin: Dry intact, no rashes or lesions.          INTERPRETATION OF TELEMETRY:  Sinus rhythm     ECG: SB 48, incomplete RBBB QRS 104ms      LABS:                        13.5   11.45 )-----------( 176      ( 27 Oct 2023 02:00 )             40.7     10-27    138  |  105  |  17  ----------------------------<  121<H>  4.1   |  25  |  1.01    Ca    9.1      27 Oct 2023 02:00  Phos  4.0     10-27  Mg     2.00     10-27    TPro  6.2  /  Alb  3.9  /  TBili  <0.2  /  DBili  x   /  AST  22  /  ALT  27  /  AlkPhos  64  10-27  Thyroid Stimulating Hormone, Serum: 0.48 uIU/mL (05.19.22 @ 11:20)                  Urinalysis Basic - ( 27 Oct 2023 02:00 )    Color: x / Appearance: x / SG: x / pH: x  Gluc: 121 mg/dL / Ketone: x  / Bili: x / Urobili: x   Blood: x / Protein: x / Nitrite: x   Leuk Esterase: x / RBC: x / WBC x   Sq Epi: x / Non Sq Epi: x / Bacteria: x        BNP  RADIOLOGY & ADDITIONAL STUDIES:  PREVIOUS DIAGNOSTIC TESTING:      ECHO FINDINGS:  ------------------------------------------------------------------------  PROCEDURE: Transthoracic echocardiogram with 2-D, M-Mode  and complete spectral and color flow Doppler.  INDICATION: Syncope and collapse (R55)  ------------------------------------------------------------------------  DIMENSIONS:  Dimensions:     Normal Values:  LA:     3.8 cm    2.0 - 4.0 cm  Ao:     3.3 cm    2.0 - 3.8 cm  SEPTUM: 1.0 cm    0.6 - 1.2 cm  PWT:    0.9 cm    0.6 - 1.1 cm  LVIDd:  4.9 cm    3.0 - 5.6 cm  LVIDs:  3.0 cm    1.8 - 4.0 cm  Derived Variables:  LVMI: 76 g/m2  RWT: 0.36  Fractional short: 39 %  Ejection Fraction (Visual Estimate): 55-60 %  ------------------------------------------------------------------------  OBSERVATIONS:  Mitral Valve: Normal mitral valve.  Aortic Root: Normal aortic root.  Aortic Valve: Normal trileaflet aortic valve.  Left Atrium: Normal left atrium.  LA volume index = 27  cc/m2.  Left Ventricle: Normal left ventricular systolic function.  No segmental wall motion abnormalities. Normal left  ventricular internal dimensions and wall thicknesses.  Normal left ventricular diastolic function.  Right Heart: Normal right atrium. Normal right ventricular  size andfunction. Normal tricuspid valve. Normal pulmonic  valve.  Pericardium/PleuraNormal pericardium with no pericardial  effusion.  ------------------------------------------------------------------------  CONCLUSIONS:  1. Normal left ventricular internal dimensions and wall  thicknesses.  2. Normal left ventricular systolic function. No segmental  wall motion abnormalities.  3. Normal left ventricular diastolic function.  4. Normal right ventricular size and function.  *** No previous Echo exam.  ------------------------------------------------------------------------  Confirmed on  8/14/2022 - 12:42:45 by Tita Varela MD  ------------------------------------------------------------------------      STRESS FINDINGS:    CATHETERIZATION FINDINGS:       Patient is a 53y old  Male who presents with a chief complaint of syncope (27 Oct 2023 16:47)          HPI:    53 year old male with a PMH of bipolar, hx of psychiatric hospitalization, depression, HTN, syncope s/p Medtronic loop recorder implanted on 8/19/2022 who presents with recurrent syncope (2-3 x ) on 10/26.  Interrogation of his loop recorder revealed 3 episodes of PAF with RVR from 176 to 261 bpm the longest lasted up to 7 hours at 5:29am.  No vinod or pauses events recorded.  Patient denies CP, SOB or palpitations.  EKG demonstrates SB 48 bpm.   Patient is a poor historian appears with poor insight.    TTE done in 2022 showed normal LVEF and normal LA.              PAST MEDICAL & SURGICAL HISTORY:  Bipolar disorder      HTN (hypertension)      Syncope  implantable loop recorder (Medtronic)      No significant past surgical history          MEDICATIONS  (STANDING): home Sertraline 100mg; Quetiapine 50mg  enoxaparin Injectable 40 milliGRAM(s) SubCutaneous every 24 hours    MEDICATIONS  (PRN):  acetaminophen     Tablet .. 650 milliGRAM(s) Oral every 6 hours PRN Temp greater or equal to 38C (100.4F), Mild Pain (1 - 3)    Allergies    penicillins (Rash)  penicillin (Hives)    Intolerances      FAMILY HISTORY:  FH: depression (Sibling)    FH: Alzheimers disease (Father)        SOCIAL HISTORY:  Denies smoking; no   Alcohol  or  Drug abuse               REVIEW OF SYSTEMS:    CONSTITUTIONAL: No fever, weight loss, chills, shakes, or fatigue  EYES: No eye pain, visual disturbances, or discharge  ENMT:  No difficulty hearing, tinnitus, vertigo; No sinus or throat pain  NECK: No pain or stiffness  RESPIRATORY: No cough, wheezing, hemoptysis, or shortness of breath  CARDIOVASCULAR: No chest pain, dyspnea, palpitations, dizziness,  paroxysmal nocturnal dyspnea, orthopnea, or arm or leg swelling +syncope  GASTROINTESTINAL: No abdominal  or epigastric pain, nausea, vomiting, hematemesis, diarrhea, constipation, melena or bright red blood.  GENITOURINARY: No dysuria, nocturia, hematuria, or urinary incontinence  NEUROLOGICAL: No headaches, memory loss, slurred speech, limb weakness, loss of strength, numbness, or tremors  SKIN: No itching, burning, rashes, or lesions   LYMPH NODES: No enlarged glands  ENDOCRINE: No heat or cold intolerance, or hair loss  MUSCULOSKELETAL: No joint pain or swelling, muscle, back, or extremity pain  PSYCHIATRIC: No depression, anxiety, or difficulty sleeping  HEME/LYMPH: No easy bruising or bleeding gums  ALLERY AND IMMUNOLOGIC: No hives or rash.      Vital Signs Last 24 Hrs  T(C): 37.1 (27 Oct 2023 18:15), Max: 37.1 (27 Oct 2023 18:15)  T(F): 98.7 (27 Oct 2023 18:15), Max: 98.7 (27 Oct 2023 18:15)  HR: 57 (27 Oct 2023 18:15) (54 - 59)  BP: 104/65 (27 Oct 2023 18:15) (90/58 - 107/69)  BP(mean): 77 (27 Oct 2023 05:50) (77 - 77)  RR: 17 (27 Oct 2023 18:15) (16 - 17)  SpO2: 97% (27 Oct 2023 18:15) (97% - 100%)    Parameters below as of 27 Oct 2023 18:15  Patient On (Oxygen Delivery Method): room air        PHYSICAL EXAM:    GENERAL: In no apparent distress  HEAD:  Atraumatic, Normocephalic  NECK: Supple . No JVD or carotid bruit or thyroidmegaly.  Carotid pulse is 2+ bilaterally.  PULMONARY: Clear to auscultation and perfusion.  No rales, wheezing, or rhonchi bilaterally.  HEART: Regular rate and rhythm; No murmurs, rubs, or gallops. +ILR  ABDOMEN: Soft, Nontender, Nondistended; Bowel sounds present  EXTREMITIES: No clubbing, cyanosis, or edema  NEUROLOGICAL: Alert oriented to person, place and time.  Speech clear.  Skin: Dry intact, no rashes or lesions.          INTERPRETATION OF TELEMETRY:  Sinus rhythm     ECG: SB 48, incomplete RBBB QRS 104ms      LABS:                        13.5   11.45 )-----------( 176      ( 27 Oct 2023 02:00 )             40.7     10-27    138  |  105  |  17  ----------------------------<  121<H>  4.1   |  25  |  1.01    Ca    9.1      27 Oct 2023 02:00  Phos  4.0     10-27  Mg     2.00     10-27    TPro  6.2  /  Alb  3.9  /  TBili  <0.2  /  DBili  x   /  AST  22  /  ALT  27  /  AlkPhos  64  10-27  Thyroid Stimulating Hormone, Serum: 0.48 uIU/mL (05.19.22 @ 11:20)                  Urinalysis Basic - ( 27 Oct 2023 02:00 )    Color: x / Appearance: x / SG: x / pH: x  Gluc: 121 mg/dL / Ketone: x  / Bili: x / Urobili: x   Blood: x / Protein: x / Nitrite: x   Leuk Esterase: x / RBC: x / WBC x   Sq Epi: x / Non Sq Epi: x / Bacteria: x        BNP  RADIOLOGY & ADDITIONAL STUDIES:  PREVIOUS DIAGNOSTIC TESTING:      ECHO FINDINGS:  ------------------------------------------------------------------------  PROCEDURE: Transthoracic echocardiogram with 2-D, M-Mode  and complete spectral and color flow Doppler.  INDICATION: Syncope and collapse (R55)  ------------------------------------------------------------------------  DIMENSIONS:  Dimensions:     Normal Values:  LA:     3.8 cm    2.0 - 4.0 cm  Ao:     3.3 cm    2.0 - 3.8 cm  SEPTUM: 1.0 cm    0.6 - 1.2 cm  PWT:    0.9 cm    0.6 - 1.1 cm  LVIDd:  4.9 cm    3.0 - 5.6 cm  LVIDs:  3.0 cm    1.8 - 4.0 cm  Derived Variables:  LVMI: 76 g/m2  RWT: 0.36  Fractional short: 39 %  Ejection Fraction (Visual Estimate): 55-60 %  ------------------------------------------------------------------------  OBSERVATIONS:  Mitral Valve: Normal mitral valve.  Aortic Root: Normal aortic root.  Aortic Valve: Normal trileaflet aortic valve.  Left Atrium: Normal left atrium.  LA volume index = 27  cc/m2.  Left Ventricle: Normal left ventricular systolic function.  No segmental wall motion abnormalities. Normal left  ventricular internal dimensions and wall thicknesses.  Normal left ventricular diastolic function.  Right Heart: Normal right atrium. Normal right ventricular  size andfunction. Normal tricuspid valve. Normal pulmonic  valve.  Pericardium/PleuraNormal pericardium with no pericardial  effusion.  ------------------------------------------------------------------------  CONCLUSIONS:  1. Normal left ventricular internal dimensions and wall  thicknesses.  2. Normal left ventricular systolic function. No segmental  wall motion abnormalities.  3. Normal left ventricular diastolic function.  4. Normal right ventricular size and function.  *** No previous Echo exam.  ------------------------------------------------------------------------  Confirmed on  8/14/2022 - 12:42:45 by Tita Varela MD  ------------------------------------------------------------------------      STRESS FINDINGS:    CATHETERIZATION FINDINGS:       Patient is a 53y old  Male who presents with a chief complaint of syncope (27 Oct 2023 16:47)          HPI:    53 year old male with a PMH of bipolar, hx of psychiatric hospitalization, depression, HTN (not on BP meds), syncope s/p Medtronic loop recorder implanted on 8/19/2022 who presents with recurrent syncope (2-3 x ) on 10/26.  Interrogation of his loop recorder revealed 3 episodes of PAF with RVR from 176 to 261 bpm the longest lasted up to 7 hours at 5:29am.  No vinod or pauses events recorded.  Patient denies CP, SOB or palpitations.  EKG demonstrates SB 48 bpm.   Patient is a poor historian appears with poor insight.    TTE done in 2022 showed normal LVEF and normal LA.              PAST MEDICAL & SURGICAL HISTORY:  Bipolar disorder      HTN (hypertension)      Syncope  implantable loop recorder (Medtronic)      No significant past surgical history          MEDICATIONS  (STANDING): home Sertraline 100mg; Quetiapine 50mg  enoxaparin Injectable 40 milliGRAM(s) SubCutaneous every 24 hours    MEDICATIONS  (PRN):  acetaminophen     Tablet .. 650 milliGRAM(s) Oral every 6 hours PRN Temp greater or equal to 38C (100.4F), Mild Pain (1 - 3)    Allergies    penicillins (Rash)  penicillin (Hives)    Intolerances      FAMILY HISTORY:  FH: depression (Sibling)    FH: Alzheimers disease (Father)        SOCIAL HISTORY:  Denies smoking; no   Alcohol  or  Drug abuse               REVIEW OF SYSTEMS:    CONSTITUTIONAL: No fever, weight loss, chills, shakes, or fatigue  EYES: No eye pain, visual disturbances, or discharge  ENMT:  No difficulty hearing, tinnitus, vertigo; No sinus or throat pain  NECK: No pain or stiffness  RESPIRATORY: No cough, wheezing, hemoptysis, or shortness of breath  CARDIOVASCULAR: No chest pain, dyspnea, palpitations, dizziness,  paroxysmal nocturnal dyspnea, orthopnea, or arm or leg swelling +syncope  GASTROINTESTINAL: No abdominal  or epigastric pain, nausea, vomiting, hematemesis, diarrhea, constipation, melena or bright red blood.  GENITOURINARY: No dysuria, nocturia, hematuria, or urinary incontinence  NEUROLOGICAL: No headaches, memory loss, slurred speech, limb weakness, loss of strength, numbness, or tremors  SKIN: No itching, burning, rashes, or lesions   LYMPH NODES: No enlarged glands  ENDOCRINE: No heat or cold intolerance, or hair loss  MUSCULOSKELETAL: No joint pain or swelling, muscle, back, or extremity pain  PSYCHIATRIC: No depression, anxiety, or difficulty sleeping  HEME/LYMPH: No easy bruising or bleeding gums  ALLERY AND IMMUNOLOGIC: No hives or rash.      Vital Signs Last 24 Hrs  T(C): 37.1 (27 Oct 2023 18:15), Max: 37.1 (27 Oct 2023 18:15)  T(F): 98.7 (27 Oct 2023 18:15), Max: 98.7 (27 Oct 2023 18:15)  HR: 57 (27 Oct 2023 18:15) (54 - 59)  BP: 104/65 (27 Oct 2023 18:15) (90/58 - 107/69)  BP(mean): 77 (27 Oct 2023 05:50) (77 - 77)  RR: 17 (27 Oct 2023 18:15) (16 - 17)  SpO2: 97% (27 Oct 2023 18:15) (97% - 100%)    Parameters below as of 27 Oct 2023 18:15  Patient On (Oxygen Delivery Method): room air        PHYSICAL EXAM:    GENERAL: In no apparent distress  HEAD:  Atraumatic, Normocephalic  NECK: Supple . No JVD or carotid bruit or thyroidmegaly.  Carotid pulse is 2+ bilaterally.  PULMONARY: Clear to auscultation and perfusion.  No rales, wheezing, or rhonchi bilaterally.  HEART: Regular rate and rhythm; No murmurs, rubs, or gallops. +ILR  ABDOMEN: Soft, Nontender, Nondistended; Bowel sounds present  EXTREMITIES: No clubbing, cyanosis, or edema  NEUROLOGICAL: Alert oriented to person, place and time.  Speech clear.  Skin: Dry intact, no rashes or lesions.          INTERPRETATION OF TELEMETRY:  Sinus rhythm     ECG: SB 48, incomplete RBBB QRS 104ms      LABS:                        13.5   11.45 )-----------( 176      ( 27 Oct 2023 02:00 )             40.7     10-27    138  |  105  |  17  ----------------------------<  121<H>  4.1   |  25  |  1.01    Ca    9.1      27 Oct 2023 02:00  Phos  4.0     10-27  Mg     2.00     10-27    TPro  6.2  /  Alb  3.9  /  TBili  <0.2  /  DBili  x   /  AST  22  /  ALT  27  /  AlkPhos  64  10-27  Thyroid Stimulating Hormone, Serum: 0.48 uIU/mL (05.19.22 @ 11:20)                  Urinalysis Basic - ( 27 Oct 2023 02:00 )    Color: x / Appearance: x / SG: x / pH: x  Gluc: 121 mg/dL / Ketone: x  / Bili: x / Urobili: x   Blood: x / Protein: x / Nitrite: x   Leuk Esterase: x / RBC: x / WBC x   Sq Epi: x / Non Sq Epi: x / Bacteria: x        BNP  RADIOLOGY & ADDITIONAL STUDIES:  PREVIOUS DIAGNOSTIC TESTING:      ECHO FINDINGS:  ------------------------------------------------------------------------  PROCEDURE: Transthoracic echocardiogram with 2-D, M-Mode  and complete spectral and color flow Doppler.  INDICATION: Syncope and collapse (R55)  ------------------------------------------------------------------------  DIMENSIONS:  Dimensions:     Normal Values:  LA:     3.8 cm    2.0 - 4.0 cm  Ao:     3.3 cm    2.0 - 3.8 cm  SEPTUM: 1.0 cm    0.6 - 1.2 cm  PWT:    0.9 cm    0.6 - 1.1 cm  LVIDd:  4.9 cm    3.0 - 5.6 cm  LVIDs:  3.0 cm    1.8 - 4.0 cm  Derived Variables:  LVMI: 76 g/m2  RWT: 0.36  Fractional short: 39 %  Ejection Fraction (Visual Estimate): 55-60 %  ------------------------------------------------------------------------  OBSERVATIONS:  Mitral Valve: Normal mitral valve.  Aortic Root: Normal aortic root.  Aortic Valve: Normal trileaflet aortic valve.  Left Atrium: Normal left atrium.  LA volume index = 27  cc/m2.  Left Ventricle: Normal left ventricular systolic function.  No segmental wall motion abnormalities. Normal left  ventricular internal dimensions and wall thicknesses.  Normal left ventricular diastolic function.  Right Heart: Normal right atrium. Normal right ventricular  size andfunction. Normal tricuspid valve. Normal pulmonic  valve.  Pericardium/PleuraNormal pericardium with no pericardial  effusion.  ------------------------------------------------------------------------  CONCLUSIONS:  1. Normal left ventricular internal dimensions and wall  thicknesses.  2. Normal left ventricular systolic function. No segmental  wall motion abnormalities.  3. Normal left ventricular diastolic function.  4. Normal right ventricular size and function.  *** No previous Echo exam.  ------------------------------------------------------------------------  Confirmed on  8/14/2022 - 12:42:45 by Tita Varela MD  ------------------------------------------------------------------------      STRESS FINDINGS:    CATHETERIZATION FINDINGS:

## 2023-10-27 NOTE — H&P ADULT - NSHPPHYSICALEXAM_GEN_ALL_CORE
VITAL SIGNS:  T(C): 37.1 (10-27-23 @ 18:15), Max: 37.1 (10-27-23 @ 18:15)  T(F): 98.7 (10-27-23 @ 18:15), Max: 98.7 (10-27-23 @ 18:15)  HR: 57 (10-27-23 @ 18:15) (54 - 59)  BP: 104/65 (10-27-23 @ 18:15) (90/58 - 107/69)  BP(mean): 77 (10-27-23 @ 05:50) (77 - 77)  RR: 17 (10-27-23 @ 18:15) (16 - 17)  SpO2: 97% (10-27-23 @ 18:15) (97% - 100%)  Wt(kg): --    PHYSICAL EXAM:  Constitutional: resting comfortably in bed; NAD  Head: NC/AT  Eyes: PERRL, EOMI, anicteric sclera  ENT: no nasal discharge; MMM  Neck: supple; no JVD  Respiratory: CTA B/L; no W/R/R  Cardiac: +S1/S2; RRR; no M/R/G  Gastrointestinal: soft, NT/ND; no rebound or guarding; +BSx4  Extremities: WWP, no clubbing or cyanosis; no peripheral edema  Musculoskeletal: NROM x4; no joint swelling, tenderness or erythema  Vascular: 2+ radial, DP/PT pulses B/L  Dermatologic: skin warm, dry and intact; no rashes, wounds, or scars  Neurologic: AAOx3; CNII-XII grossly intact; no focal deficits  Psychiatric: affect and characteristics of appearance, verbalizations, behaviors are appropriate

## 2023-10-27 NOTE — ED PROVIDER NOTE - CLINICAL SUMMARY MEDICAL DECISION MAKING FREE TEXT BOX
53-year-old male with a past medical history of hypertension, syncope, bipolar disorder presenting with 3 syncopal episodes tonight 1 of which was unwitnessed. Patient complaining of left head pain. Patient denies AC or aspirin use, Chest pain, difficulty breathing, nausea, vomiting, palpitations. Patient has loop recorder placed for syncopal episode. Syncope work-up, CT head.

## 2023-10-27 NOTE — H&P ADULT - NSHPSOCIALHISTORY_GEN_ALL_CORE
lives with brother  , has a teenage daughter   previously worked in construction  fully independent  denies toxic substances

## 2023-10-27 NOTE — CONSULT NOTE ADULT - NS ATTEND AMEND GEN_ALL_CORE FT
53 year old male with a PMH of bipolar, depression, HTN-not on meds, syncope s/p Medtronic loop recorder implanted on 8/19/2022 who presents with recurrent syncope (2-3 x ) on 10/26.  Interrogation of his loop recorder revealed 3 episodes of PAF with RVR from 176 to 261 bpm the longest lasted up to 7 hours at 5:29am.  No vinod or pauses events recorded.  Patient denies CP, SOB or palpitations.  EKG demonstrates SB 48 bpm.   TTE done in 8/2022 showed normal LVEF and normal LA.    Can fu as outpt for consideration of rhythm control strategy with an ablation. Consider starting beta blocker for now.

## 2023-10-27 NOTE — H&P ADULT - NSHPLABSRESULTS_GEN_ALL_CORE
LABS:                        13.5   11.45 )-----------( 176      ( 27 Oct 2023 02:00 )             40.7     10-27    138  |  105  |  17  ----------------------------<  121<H>  4.1   |  25  |  1.01    Ca    9.1      27 Oct 2023 02:00  Phos  4.0     10-27  Mg     2.00     10-27    TPro  6.2  /  Alb  3.9  /  TBili  <0.2  /  DBili  x   /  AST  22  /  ALT  27  /  AlkPhos  64  10-27      Urinalysis Basic - ( 27 Oct 2023 02:00 )    Color: x / Appearance: x / SG: x / pH: x  Gluc: 121 mg/dL / Ketone: x  / Bili: x / Urobili: x   Blood: x / Protein: x / Nitrite: x   Leuk Esterase: x / RBC: x / WBC x   Sq Epi: x / Non Sq Epi: x / Bacteria: x      CAPILLARY BLOOD GLUCOSE          RADIOLOGY & ADDITIONAL TESTS: Reviewed.

## 2023-10-27 NOTE — ED PROVIDER NOTE - OBJECTIVE STATEMENT
Cardiology: Dr. Dennison on [facility] in [place], [phone]  Loop recorder: [email or web address] Heart ID: [email or web address], Code: 1311, Password: [email or web address], Patient support: [phone] 53-year-old male with a past medical history of hypertension, syncope, bipolar disorder presenting with multiple syncopal episode. Patient states he had a syncopal episode while urinating. Syncopal episode was unwitnessed however brother who lives in the house heard him and came to him. Patient had 2 additional syncopal episodes. Patient complaining of left head pain. Denies chest pain, palpitations, nausea, vomiting, abdominal pain, difficulty breathing, fevers. Patient states he had a loop recorder placed for syncope.    Cardiology: Dr. Dennison on [facility] in [place], [phone]  Loop recorder: [email or web address] Heart ID: [email or web address], Code: 1311, Password: [email or web address], Patient support: [phone]

## 2023-10-27 NOTE — H&P ADULT - HISTORY OF PRESENT ILLNESS
Pt is a 54 yo M with PMH BP d/o, HTN, and syncope (s/p ILR) p/w multiple syncopal episodes x1d. Was in his usual state of health watching TV when he stood from the couch and felt lightheaded and fell to the ground. He woke up to his brother standing next to him, who came to check on him from another room. When he tried to stand up, he passed out again. This happened 3x before he was able to successfully go back to a seated position on the couch. He has had a similar episode in the past for which he follows with cardiology and had a loop recorder placed. Since this episode, he has been experiencing occipital pain. Otherwise denies HA, changes to vision, CP, SOB, abd pain, changes to BMs or urination, and pain in extremities.     On arrival, T 98.4, HR 54, /69, RR 16 O2sat 100% RA. EKG sinus bradycardia without ischemic changes. Labs with WBC 11.45 and trop neg x2. CTH neg but with mild L posterior scalp soft tissue swelling. Orthostatics assessed and negative, however notable for HR 62--64--66 going from lying--seated--standing. Pt given 1L NS. EP consulted and pending recs.

## 2023-10-27 NOTE — H&P ADULT - PROBLEM SELECTOR PLAN 2
- HR 54 on arrival  - EKG sinus bradycardia without ischemic changes  - labs largely unremarkable; trop neg x2  - orthostatics performed and neg, however, ?poor chronotropic response given HR went from 62--66 from lying to standing  - EP consulted, appreciate recs  - continue to monitor on tele  - avoid BB / anti-dominique agents

## 2023-10-27 NOTE — ED PROVIDER NOTE - PHYSICAL EXAMINATION
General: Appears well and nontoxic  Mentation: AAO x 3  psych: mood appropriate  HEENT: airway patent, conjunctivae clear bilaterally  Resp: symmetric chest rise, no resp distress, breath sounds CTA bilaterally  Cardio: RRR, no m/r/g  GI: soft/nondistended/nontender  Neuro: sensation and motor function grossly intact  Skin: no cyanosis, no jaundice  MSK: normal movement of all extremities  Lymph/Vasc: no LE edema

## 2023-10-27 NOTE — H&P ADULT - PROBLEM SELECTOR PLAN 7
- F: s/p 1L NS  - E: replete K<4, Mg<2  - N: DASH/TLC  - D: lovenox 40mg q24h  - G: none    code: full  dispo: pending medical optimization

## 2023-10-27 NOTE — ED ADULT NURSE NOTE - OBJECTIVE STATEMENT
Karin RN-presents S/P unwitnessed syncope after standing too quickly while using the bathroom. Pt complaining of pain to the back of head. as per Pt had similar episode last year. denies neck or back pain. No complaints of chest pain, headache, nausea, dizziness, vomiting  SOB, fever, chills verbalized. NSR on cardiac monitor. currently has loop recorder. Respirations even and unlabored with equal chest rise bilaterally. Spo2 100%. ABD is soft, non tender, non distended. history of bipolar DO. Pt endorsed to primary RN Zeus.

## 2023-10-27 NOTE — H&P ADULT - ASSESSMENT
Pt is a 52 yo M with PMH BP d/o, HTN, and syncope (s/p ILR) p/w multiple syncopal episodes x1d. Hemodynamically stable on arrival with labs largely unremarkable. EKG with sinus bradycardia. CTH neg but with mild L posterior scalp soft tissue swelling. EP consulted, pending recs.

## 2023-10-27 NOTE — H&P ADULT - PROBLEM SELECTOR PLAN 1
- pt p/w syncopal episode while going from seated to standing position while watching tv; preceded with LH and then fell to the ground with repeat episode x3  - similar episode in the past, evaluated by cards and ILR placed   - hemodynamically stable on arrival  - EKG with sinus bradycardia, no acute ischemic changes  - labs largely unremarkable, trop neg x2  - orthostatics as below  - CTH with mild L posterior scalp soft tissue swelling   - fall precautions  - EP consulted, f/u recs   - f/u ILR interrogation   - PT eval if cardiac w/u unrevealing

## 2023-10-27 NOTE — CONSULT NOTE ADULT - ASSESSMENT
53 year old male with a PMH of bipolar, depression, HTN, syncope s/p [employer] loop recorder implanted on 8/19/2022 who presents with recurrent syncope (2-3 x ) on 10/26.  Interrogation of his loop recorder revealed 3 episodes of PAF with RVR from 176 to 261 bpm the longest lasted up to 7 hours at 5:29am.  No vinod or pauses events recorded.  Patient denies CP, SOB or palpitations.  EKG demonstrates SB 48 bpm.   TTE done in 8/2022 showed normal LVEF and normal LA.      Syncope in the setting of newly diagnosed PAF with RVR, no evidence of correlated symptomatic vinod events noted   Patient's CHADS2-VASC2 score is 1 which correlates to 1.3% Stroke risks may consider anticoagulation therapy  -Telemetry monitoring   -Rhythm control strategy recommended considering his symptoms (syncope)   -Consider low dose of beta-blocker for rate control, monitor for TBS   -Will d/w with EP Dr. Wilkins         53 year old male with a PMH of bipolar, depression, HTN, syncope s/p [employer] loop recorder implanted on 8/19/2022 who presents with recurrent syncope (2-3 x ) on 10/26.  Interrogation of his loop recorder revealed 3 episodes of PAF with RVR from 176 to 261 bpm the longest lasted up to 7 hours at 5:29am.  No vinod or pauses events recorded.  Patient denies CP, SOB or palpitations.  EKG demonstrates SB 48 bpm.   TTE done in 8/2022 showed normal LVEF and normal LA.      Syncope in the setting of newly diagnosed PAF with RVR, no evidence of correlated symptomatic vinod events noted   Patient's CHADS2-VASC2 score is 1 which correlates to 1.3% Stroke risks may consider anticoagulation therapy  -Telemetry monitoring   -Patient can benefit from rhythm control strategy considering his symptoms (syncope), however, he has hx of non-compliant with meds, unclear if he will take anticoagulation therapy   -Consider low dose of beta-blocker for rate control, monitor for TBS   -Will d/w with EP Dr. Wilkins         53 year old male with a PMH of bipolar, depression, HTN-not on meds, syncope s/p [employer]tronic loop recorder implanted on 8/19/2022 who presents with recurrent syncope (2-3 x ) on 10/26.  Interrogation of his loop recorder revealed 3 episodes of PAF with RVR from 176 to 261 bpm the longest lasted up to 7 hours at 5:29am.  No vinod or pauses events recorded.  Patient denies CP, SOB or palpitations.  EKG demonstrates SB 48 bpm.   TTE done in 8/2022 showed normal LVEF and normal LA.      Syncope in the setting of newly diagnosed PAF with RVR, no evidence of correlated symptomatic vinod events noted   Patient's CHADS2-VASC2 score is 1 which correlates to 1.3% Stroke risks may consider anticoagulation therapy, however, pt's not hypertensive, his CHADS-VASc therefore is 0 which correlated to low Stroke risk  -Telemetry monitoring   -Patient can benefit from rhythm control strategy considering his symptoms (syncope), however, he has hx of non-compliant with meds, unclear if he will  adhere to AC therapy (which is required minimum one month post ablation)  -Consider low dose of beta-blocker for rate control, monitor for TBS   -Discussed with EP Dr. Wilkins

## 2023-10-27 NOTE — H&P ADULT - PROBLEM SELECTOR PLAN 3
04-Sep-2020 12:30 - WBC 11.45, no left shift  - not meeting SIRS criteria   - likely reactive in setting of syncope and fall   - continue to trend  - defer abx

## 2023-10-27 NOTE — ED PROVIDER NOTE - DIFFERENTIAL DIAGNOSIS
Post-micturition syncope: r/o arrythmia, electrolyte abnormality, no chest pain or palpitations to suggest ischemic morphology, orthostasis  C-spine clears by NEXUS, CT Head given multiple head strike with LOC. FALKOWSKA. Differential Diagnosis

## 2023-10-27 NOTE — H&P ADULT - NSHPOUTPATIENTPROVIDERS_GEN_ALL_CORE
[facility] - Dr. Dennison [phone] [facility] - Dr. Dennison [phone]  [facility] - Dr. Flores/Dr. Jeovanny Narvaez

## 2023-10-28 DIAGNOSIS — E78.00 PURE HYPERCHOLESTEROLEMIA, UNSPECIFIED: ICD-10-CM

## 2023-10-28 DIAGNOSIS — I48.0 PAROXYSMAL ATRIAL FIBRILLATION: ICD-10-CM

## 2023-10-28 LAB
A1C WITH ESTIMATED AVERAGE GLUCOSE RESULT: 5.2 % — SIGNIFICANT CHANGE UP (ref 4–5.6)
A1C WITH ESTIMATED AVERAGE GLUCOSE RESULT: 5.2 % — SIGNIFICANT CHANGE UP (ref 4–5.6)
ANION GAP SERPL CALC-SCNC: 12 MMOL/L — SIGNIFICANT CHANGE UP (ref 7–14)
ANION GAP SERPL CALC-SCNC: 12 MMOL/L — SIGNIFICANT CHANGE UP (ref 7–14)
BUN SERPL-MCNC: 15 MG/DL — SIGNIFICANT CHANGE UP (ref 7–23)
BUN SERPL-MCNC: 15 MG/DL — SIGNIFICANT CHANGE UP (ref 7–23)
CALCIUM SERPL-MCNC: 9.2 MG/DL — SIGNIFICANT CHANGE UP (ref 8.4–10.5)
CALCIUM SERPL-MCNC: 9.2 MG/DL — SIGNIFICANT CHANGE UP (ref 8.4–10.5)
CHLORIDE SERPL-SCNC: 104 MMOL/L — SIGNIFICANT CHANGE UP (ref 98–107)
CHLORIDE SERPL-SCNC: 104 MMOL/L — SIGNIFICANT CHANGE UP (ref 98–107)
CHOLEST SERPL-MCNC: 236 MG/DL — HIGH
CHOLEST SERPL-MCNC: 236 MG/DL — HIGH
CO2 SERPL-SCNC: 23 MMOL/L — SIGNIFICANT CHANGE UP (ref 22–31)
CO2 SERPL-SCNC: 23 MMOL/L — SIGNIFICANT CHANGE UP (ref 22–31)
CREAT SERPL-MCNC: 0.91 MG/DL — SIGNIFICANT CHANGE UP (ref 0.5–1.3)
CREAT SERPL-MCNC: 0.91 MG/DL — SIGNIFICANT CHANGE UP (ref 0.5–1.3)
EGFR: 100 ML/MIN/1.73M2 — SIGNIFICANT CHANGE UP
EGFR: 100 ML/MIN/1.73M2 — SIGNIFICANT CHANGE UP
ESTIMATED AVERAGE GLUCOSE: 103 — SIGNIFICANT CHANGE UP
ESTIMATED AVERAGE GLUCOSE: 103 — SIGNIFICANT CHANGE UP
GLUCOSE SERPL-MCNC: 94 MG/DL — SIGNIFICANT CHANGE UP (ref 70–99)
GLUCOSE SERPL-MCNC: 94 MG/DL — SIGNIFICANT CHANGE UP (ref 70–99)
HCT VFR BLD CALC: 42 % — SIGNIFICANT CHANGE UP (ref 39–50)
HCT VFR BLD CALC: 42 % — SIGNIFICANT CHANGE UP (ref 39–50)
HDLC SERPL-MCNC: 39 MG/DL — LOW
HDLC SERPL-MCNC: 39 MG/DL — LOW
HGB BLD-MCNC: 14.2 G/DL — SIGNIFICANT CHANGE UP (ref 13–17)
HGB BLD-MCNC: 14.2 G/DL — SIGNIFICANT CHANGE UP (ref 13–17)
LIPID PNL WITH DIRECT LDL SERPL: 159 MG/DL — HIGH
LIPID PNL WITH DIRECT LDL SERPL: 159 MG/DL — HIGH
MAGNESIUM SERPL-MCNC: 1.9 MG/DL — SIGNIFICANT CHANGE UP (ref 1.6–2.6)
MAGNESIUM SERPL-MCNC: 1.9 MG/DL — SIGNIFICANT CHANGE UP (ref 1.6–2.6)
MCHC RBC-ENTMCNC: 33 PG — SIGNIFICANT CHANGE UP (ref 27–34)
MCHC RBC-ENTMCNC: 33 PG — SIGNIFICANT CHANGE UP (ref 27–34)
MCHC RBC-ENTMCNC: 33.8 GM/DL — SIGNIFICANT CHANGE UP (ref 32–36)
MCHC RBC-ENTMCNC: 33.8 GM/DL — SIGNIFICANT CHANGE UP (ref 32–36)
MCV RBC AUTO: 97.7 FL — SIGNIFICANT CHANGE UP (ref 80–100)
MCV RBC AUTO: 97.7 FL — SIGNIFICANT CHANGE UP (ref 80–100)
NON HDL CHOLESTEROL: 197 MG/DL — HIGH
NON HDL CHOLESTEROL: 197 MG/DL — HIGH
NRBC # BLD: 0 /100 WBCS — SIGNIFICANT CHANGE UP (ref 0–0)
NRBC # BLD: 0 /100 WBCS — SIGNIFICANT CHANGE UP (ref 0–0)
NRBC # FLD: 0 K/UL — SIGNIFICANT CHANGE UP (ref 0–0)
NRBC # FLD: 0 K/UL — SIGNIFICANT CHANGE UP (ref 0–0)
PHOSPHATE SERPL-MCNC: 3.8 MG/DL — SIGNIFICANT CHANGE UP (ref 2.5–4.5)
PHOSPHATE SERPL-MCNC: 3.8 MG/DL — SIGNIFICANT CHANGE UP (ref 2.5–4.5)
PLATELET # BLD AUTO: 164 K/UL — SIGNIFICANT CHANGE UP (ref 150–400)
PLATELET # BLD AUTO: 164 K/UL — SIGNIFICANT CHANGE UP (ref 150–400)
POTASSIUM SERPL-MCNC: 4 MMOL/L — SIGNIFICANT CHANGE UP (ref 3.5–5.3)
POTASSIUM SERPL-MCNC: 4 MMOL/L — SIGNIFICANT CHANGE UP (ref 3.5–5.3)
POTASSIUM SERPL-SCNC: 4 MMOL/L — SIGNIFICANT CHANGE UP (ref 3.5–5.3)
POTASSIUM SERPL-SCNC: 4 MMOL/L — SIGNIFICANT CHANGE UP (ref 3.5–5.3)
RBC # BLD: 4.3 M/UL — SIGNIFICANT CHANGE UP (ref 4.2–5.8)
RBC # BLD: 4.3 M/UL — SIGNIFICANT CHANGE UP (ref 4.2–5.8)
RBC # FLD: 12.4 % — SIGNIFICANT CHANGE UP (ref 10.3–14.5)
RBC # FLD: 12.4 % — SIGNIFICANT CHANGE UP (ref 10.3–14.5)
SODIUM SERPL-SCNC: 139 MMOL/L — SIGNIFICANT CHANGE UP (ref 135–145)
SODIUM SERPL-SCNC: 139 MMOL/L — SIGNIFICANT CHANGE UP (ref 135–145)
TRIGL SERPL-MCNC: 190 MG/DL — HIGH
TRIGL SERPL-MCNC: 190 MG/DL — HIGH
TSH SERPL-MCNC: 1.2 UIU/ML — SIGNIFICANT CHANGE UP (ref 0.27–4.2)
TSH SERPL-MCNC: 1.2 UIU/ML — SIGNIFICANT CHANGE UP (ref 0.27–4.2)
WBC # BLD: 7.32 K/UL — SIGNIFICANT CHANGE UP (ref 3.8–10.5)
WBC # BLD: 7.32 K/UL — SIGNIFICANT CHANGE UP (ref 3.8–10.5)
WBC # FLD AUTO: 7.32 K/UL — SIGNIFICANT CHANGE UP (ref 3.8–10.5)
WBC # FLD AUTO: 7.32 K/UL — SIGNIFICANT CHANGE UP (ref 3.8–10.5)

## 2023-10-28 PROCEDURE — 99233 SBSQ HOSP IP/OBS HIGH 50: CPT

## 2023-10-28 RX ORDER — METOPROLOL TARTRATE 50 MG
12.5 TABLET ORAL
Refills: 0 | Status: DISCONTINUED | OUTPATIENT
Start: 2023-10-28 | End: 2023-10-30

## 2023-10-28 RX ADMIN — Medication 12.5 MILLIGRAM(S): at 17:37

## 2023-10-28 RX ADMIN — ENOXAPARIN SODIUM 40 MILLIGRAM(S): 100 INJECTION SUBCUTANEOUS at 17:38

## 2023-10-28 RX ADMIN — Medication 6 MILLIGRAM(S): at 21:45

## 2023-10-28 RX ADMIN — QUETIAPINE FUMARATE 250 MILLIGRAM(S): 200 TABLET, FILM COATED ORAL at 21:47

## 2023-10-28 RX ADMIN — SERTRALINE 150 MILLIGRAM(S): 25 TABLET, FILM COATED ORAL at 11:03

## 2023-10-28 NOTE — PROGRESS NOTE ADULT - PROBLEM SELECTOR PLAN 1
- pt p/w syncopal episode while going from seated to standing position while watching tv; preceded with LH and then fell to the ground with repeat episode x3  - similar episode in the past, evaluated by cards and ILR placed   - hemodynamically stable on arrival  - EKG with sinus bradycardia, no acute ischemic changes  - labs largely unremarkable, trop neg x2  - orthostatics neg   - CTH with mild L posterior scalp soft tissue swelling   - fall precautions  - ILR interrogation - Afib RVR on 10/26 (7 hrs) which may correlate with syncope. Recommend low dose beta blocker and monitor for tachybrady syndrome; lopressor 12.5mg PO BID   - EP recs appreciated   - PT eval if cardiac w/u unrevealing Found to have pAF after interrogation of ILR  [id number]: 1 will defer AC given low stroke risk and frequent syncope   - trial low dose beta blocker, will monitor for TBS  - EP follow up for possible ablation

## 2023-10-28 NOTE — PROGRESS NOTE ADULT - PROBLEM SELECTOR PLAN 5
- c/w home sertraline 150mg bedtime, quetiapine 250mg bedtime, melatonin 6mg bedtime - not on meds  - normotensive  - continue to monitor

## 2023-10-28 NOTE — PROGRESS NOTE ADULT - PROBLEM SELECTOR PLAN 3
- CTH as above   - supportive care  - fall precautions - HR 54 on arrival  - EKG sinus bradycardia without ischemic changes  - labs largely unremarkable; trop neg x2  - orthostatics performed and neg, however, ?poor chronotropic response given HR went from 62--66 from lying to standing  - EP consulted, appreciate recs  - continue to monitor on tele  - trial low dose beta blocker per EP

## 2023-10-28 NOTE — PROGRESS NOTE ADULT - PROBLEM SELECTOR PLAN 4
- not on meds  - normotensive  - continue to monitor - CTH as above   - supportive care  - fall precautions

## 2023-10-28 NOTE — PROGRESS NOTE ADULT - PROBLEM SELECTOR PLAN 6
- F: s/p 1L NS  - E: replete K<4, Mg<2  - N: DASH/TLC  - D: lovenox 40mg q24h  - G: none    code: full  dispo: pending medical optimization - c/w home sertraline 150mg bedtime, quetiapine 250mg bedtime, melatonin 6mg bedtime

## 2023-10-28 NOTE — PROGRESS NOTE ADULT - PROBLEM SELECTOR PLAN 2
- HR 54 on arrival  - EKG sinus bradycardia without ischemic changes  - labs largely unremarkable; trop neg x2  - orthostatics performed and neg, however, ?poor chronotropic response given HR went from 62--66 from lying to standing  - EP consulted, appreciate recs  - continue to monitor on tele  - trial low dose beta blocker per EP - pt p/w syncopal episode while going from seated to standing position while watching tv; preceded with LH and then fell to the ground with repeat episode x3  - similar episode in the past, evaluated by cards and ILR placed   - hemodynamically stable on arrival  - EKG with sinus bradycardia, no acute ischemic changes  - labs largely unremarkable, trop neg x2  - orthostatics neg   - CTH with mild L posterior scalp soft tissue swelling   - fall precautions  - ILR interrogation - Afib RVR on 10/26 (7 hrs) which may correlate with syncope. Recommend low dose beta blocker and monitor for tachybrady syndrome; lopressor 12.5mg PO BID   - EP recs appreciated   - PT eval if cardiac w/u unrevealing

## 2023-10-28 NOTE — PROGRESS NOTE ADULT - PROBLEM SELECTOR PLAN 7
- F: s/p 1L NS  - E: replete K<4, Mg<2  - N: DASH/TLC  - D: lovenox 40mg q24h  - G: none    code: full  dispo: pending medical optimization   ASCVD 8.9%   Will discuss moderate intensity statin vs lifestyle changes

## 2023-10-29 LAB
ANION GAP SERPL CALC-SCNC: 13 MMOL/L — SIGNIFICANT CHANGE UP (ref 7–14)
ANION GAP SERPL CALC-SCNC: 13 MMOL/L — SIGNIFICANT CHANGE UP (ref 7–14)
BUN SERPL-MCNC: 21 MG/DL — SIGNIFICANT CHANGE UP (ref 7–23)
BUN SERPL-MCNC: 21 MG/DL — SIGNIFICANT CHANGE UP (ref 7–23)
CALCIUM SERPL-MCNC: 9.2 MG/DL — SIGNIFICANT CHANGE UP (ref 8.4–10.5)
CALCIUM SERPL-MCNC: 9.2 MG/DL — SIGNIFICANT CHANGE UP (ref 8.4–10.5)
CHLORIDE SERPL-SCNC: 103 MMOL/L — SIGNIFICANT CHANGE UP (ref 98–107)
CHLORIDE SERPL-SCNC: 103 MMOL/L — SIGNIFICANT CHANGE UP (ref 98–107)
CO2 SERPL-SCNC: 24 MMOL/L — SIGNIFICANT CHANGE UP (ref 22–31)
CO2 SERPL-SCNC: 24 MMOL/L — SIGNIFICANT CHANGE UP (ref 22–31)
CREAT SERPL-MCNC: 0.96 MG/DL — SIGNIFICANT CHANGE UP (ref 0.5–1.3)
CREAT SERPL-MCNC: 0.96 MG/DL — SIGNIFICANT CHANGE UP (ref 0.5–1.3)
EGFR: 93 ML/MIN/1.73M2 — SIGNIFICANT CHANGE UP
EGFR: 93 ML/MIN/1.73M2 — SIGNIFICANT CHANGE UP
GLUCOSE SERPL-MCNC: 96 MG/DL — SIGNIFICANT CHANGE UP (ref 70–99)
GLUCOSE SERPL-MCNC: 96 MG/DL — SIGNIFICANT CHANGE UP (ref 70–99)
HCT VFR BLD CALC: 42.7 % — SIGNIFICANT CHANGE UP (ref 39–50)
HCT VFR BLD CALC: 42.7 % — SIGNIFICANT CHANGE UP (ref 39–50)
HGB BLD-MCNC: 14.2 G/DL — SIGNIFICANT CHANGE UP (ref 13–17)
HGB BLD-MCNC: 14.2 G/DL — SIGNIFICANT CHANGE UP (ref 13–17)
MAGNESIUM SERPL-MCNC: 2.1 MG/DL — SIGNIFICANT CHANGE UP (ref 1.6–2.6)
MAGNESIUM SERPL-MCNC: 2.1 MG/DL — SIGNIFICANT CHANGE UP (ref 1.6–2.6)
MCHC RBC-ENTMCNC: 32.7 PG — SIGNIFICANT CHANGE UP (ref 27–34)
MCHC RBC-ENTMCNC: 32.7 PG — SIGNIFICANT CHANGE UP (ref 27–34)
MCHC RBC-ENTMCNC: 33.3 GM/DL — SIGNIFICANT CHANGE UP (ref 32–36)
MCHC RBC-ENTMCNC: 33.3 GM/DL — SIGNIFICANT CHANGE UP (ref 32–36)
MCV RBC AUTO: 98.4 FL — SIGNIFICANT CHANGE UP (ref 80–100)
MCV RBC AUTO: 98.4 FL — SIGNIFICANT CHANGE UP (ref 80–100)
NRBC # BLD: 0 /100 WBCS — SIGNIFICANT CHANGE UP (ref 0–0)
NRBC # BLD: 0 /100 WBCS — SIGNIFICANT CHANGE UP (ref 0–0)
NRBC # FLD: 0 K/UL — SIGNIFICANT CHANGE UP (ref 0–0)
NRBC # FLD: 0 K/UL — SIGNIFICANT CHANGE UP (ref 0–0)
PHOSPHATE SERPL-MCNC: 4.3 MG/DL — SIGNIFICANT CHANGE UP (ref 2.5–4.5)
PHOSPHATE SERPL-MCNC: 4.3 MG/DL — SIGNIFICANT CHANGE UP (ref 2.5–4.5)
PLATELET # BLD AUTO: 157 K/UL — SIGNIFICANT CHANGE UP (ref 150–400)
PLATELET # BLD AUTO: 157 K/UL — SIGNIFICANT CHANGE UP (ref 150–400)
POTASSIUM SERPL-MCNC: 4.1 MMOL/L — SIGNIFICANT CHANGE UP (ref 3.5–5.3)
POTASSIUM SERPL-MCNC: 4.1 MMOL/L — SIGNIFICANT CHANGE UP (ref 3.5–5.3)
POTASSIUM SERPL-SCNC: 4.1 MMOL/L — SIGNIFICANT CHANGE UP (ref 3.5–5.3)
POTASSIUM SERPL-SCNC: 4.1 MMOL/L — SIGNIFICANT CHANGE UP (ref 3.5–5.3)
RBC # BLD: 4.34 M/UL — SIGNIFICANT CHANGE UP (ref 4.2–5.8)
RBC # BLD: 4.34 M/UL — SIGNIFICANT CHANGE UP (ref 4.2–5.8)
RBC # FLD: 12.2 % — SIGNIFICANT CHANGE UP (ref 10.3–14.5)
RBC # FLD: 12.2 % — SIGNIFICANT CHANGE UP (ref 10.3–14.5)
SODIUM SERPL-SCNC: 140 MMOL/L — SIGNIFICANT CHANGE UP (ref 135–145)
SODIUM SERPL-SCNC: 140 MMOL/L — SIGNIFICANT CHANGE UP (ref 135–145)
WBC # BLD: 7.77 K/UL — SIGNIFICANT CHANGE UP (ref 3.8–10.5)
WBC # BLD: 7.77 K/UL — SIGNIFICANT CHANGE UP (ref 3.8–10.5)
WBC # FLD AUTO: 7.77 K/UL — SIGNIFICANT CHANGE UP (ref 3.8–10.5)
WBC # FLD AUTO: 7.77 K/UL — SIGNIFICANT CHANGE UP (ref 3.8–10.5)

## 2023-10-29 PROCEDURE — 99232 SBSQ HOSP IP/OBS MODERATE 35: CPT

## 2023-10-29 RX ORDER — ATORVASTATIN CALCIUM 80 MG/1
20 TABLET, FILM COATED ORAL AT BEDTIME
Refills: 0 | Status: DISCONTINUED | OUTPATIENT
Start: 2023-10-29 | End: 2023-10-30

## 2023-10-29 RX ADMIN — QUETIAPINE FUMARATE 250 MILLIGRAM(S): 200 TABLET, FILM COATED ORAL at 21:19

## 2023-10-29 RX ADMIN — Medication 6 MILLIGRAM(S): at 21:19

## 2023-10-29 RX ADMIN — SERTRALINE 150 MILLIGRAM(S): 25 TABLET, FILM COATED ORAL at 11:10

## 2023-10-29 RX ADMIN — ENOXAPARIN SODIUM 40 MILLIGRAM(S): 100 INJECTION SUBCUTANEOUS at 17:40

## 2023-10-29 RX ADMIN — ATORVASTATIN CALCIUM 20 MILLIGRAM(S): 80 TABLET, FILM COATED ORAL at 21:19

## 2023-10-29 NOTE — PROGRESS NOTE ADULT - PROBLEM SELECTOR PLAN 8
- F: s/p 1L NS  - E: replete K<4, Mg<2  - N: DASH/TLC  - D: lovenox 40mg q24h  - G: none    code: full  dispo: pending medical optimization - F: s/p 1L NS  - E: replete K<4, Mg<2  - N: DASH/TLC  - D: lovenox 40mg q24h  - G: none    code: full  dispo: home

## 2023-10-29 NOTE — PROGRESS NOTE ADULT - PROBLEM SELECTOR PLAN 1
Found to have pAF after interrogation of ILR  [id number]: 1 will defer AC given low stroke risk and frequent syncope   - trial low dose beta blocker, will monitor for TBS  - EP follow up for possible ablation Found to have pAF after interrogation of ILR  [id number]: 1 will defer AC given low stroke risk and frequent syncope   - lopressor 12.5 mg PO BID, will monitor for TBS  - EP follow up for possible ablation as outpatient

## 2023-10-29 NOTE — PHYSICAL THERAPY INITIAL EVALUATION ADULT - PERTINENT HX OF CURRENT PROBLEM, REHAB EVAL
Pt is a 53 year old male who presented to hospital status post syncope. CT Head negative but with mild left posterior scalp soft tissue swelling.

## 2023-10-29 NOTE — PHYSICAL THERAPY INITIAL EVALUATION ADULT - ADDITIONAL COMMENTS
Pt lives in a house with his brother with ~2 stairs to enter; Pt resides on the first floor and Pt stated his brother lives upstairs. prior to admission Pt was independent with all mobility and ambulated without an assistive device.     Pt. left comfortable in bed with all tubes/lines intact, head of the bed elevated, call bell in reach and in NAD.

## 2023-10-29 NOTE — PROGRESS NOTE ADULT - PROBLEM SELECTOR PLAN 3
- HR 54 on arrival  - EKG sinus bradycardia without ischemic changes  - labs largely unremarkable; trop neg x2  - orthostatics performed and neg, however, ?poor chronotropic response given HR went from 62--66 from lying to standing  - EP consulted, appreciate recs  - continue to monitor on tele  - trial low dose beta blocker per EP EKG sinus bradycardia without ischemic changes  - labs largely unremarkable; trop neg x2  - orthostatics performed and neg, however, ?poor chronotropic response given HR went from 62--66 from lying to standing  - EP consulted, appreciate recs  - continue to monitor on tele  - lopressor 12.5 mg PO BID per EP

## 2023-10-29 NOTE — PROGRESS NOTE ADULT - PROBLEM SELECTOR PLAN 7
ASCVD 8.9%   Will discuss moderate intensity statin vs lifestyle changes   ASCVD 8.9%   - start Lipitor 20

## 2023-10-29 NOTE — PROGRESS NOTE ADULT - PROBLEM SELECTOR PLAN 2
- pt p/w syncopal episode while going from seated to standing position while watching tv; preceded with LH and then fell to the ground with repeat episode x3  - similar episode in the past, evaluated by cards and ILR placed   - hemodynamically stable on arrival  - EKG with sinus bradycardia, no acute ischemic changes  - labs largely unremarkable, trop neg x2  - orthostatics neg   - CTH with mild L posterior scalp soft tissue swelling   - fall precautions  - ILR interrogation - Afib RVR on 10/26 (7 hrs) which may correlate with syncope. Recommend low dose beta blocker and monitor for tachybrady syndrome; lopressor 12.5mg PO BID   - EP recs appreciated   - PT eval if cardiac w/u unrevealing - pt p/w syncopal episode while going from seated to standing position while watching tv; preceded with LH and then fell to the ground with repeat episode x3  - similar episode in the past, evaluated by cards and ILR placed   - hemodynamically stable on arrival  - EKG with sinus bradycardia, no acute ischemic changes  - labs largely unremarkable, trop neg x2  - orthostatics neg   - CTH with mild L posterior scalp soft tissue swelling   - fall precautions  - ILR interrogation - Afib RVR on 10/26 (7 hrs) which may correlate with syncope. Recommend low dose beta blocker and monitor for tachybrady syndrome; lopressor 12.5mg PO BID. Outpatient follow up for ablation  - EP recs appreciated   - PT eval - no skilled PT needs

## 2023-10-30 ENCOUNTER — TRANSCRIPTION ENCOUNTER (OUTPATIENT)
Age: 53
End: 2023-10-30

## 2023-10-30 VITALS
TEMPERATURE: 98 F | OXYGEN SATURATION: 96 % | DIASTOLIC BLOOD PRESSURE: 63 MMHG | RESPIRATION RATE: 18 BRPM | SYSTOLIC BLOOD PRESSURE: 112 MMHG | HEART RATE: 55 BPM

## 2023-10-30 PROBLEM — I10 ESSENTIAL (PRIMARY) HYPERTENSION: Chronic | Status: ACTIVE | Noted: 2023-10-27

## 2023-10-30 PROBLEM — R55 SYNCOPE AND COLLAPSE: Chronic | Status: ACTIVE | Noted: 2023-10-27

## 2023-10-30 LAB
ANION GAP SERPL CALC-SCNC: 12 MMOL/L — SIGNIFICANT CHANGE UP (ref 7–14)
ANION GAP SERPL CALC-SCNC: 12 MMOL/L — SIGNIFICANT CHANGE UP (ref 7–14)
BUN SERPL-MCNC: 17 MG/DL — SIGNIFICANT CHANGE UP (ref 7–23)
BUN SERPL-MCNC: 17 MG/DL — SIGNIFICANT CHANGE UP (ref 7–23)
CALCIUM SERPL-MCNC: 9.2 MG/DL — SIGNIFICANT CHANGE UP (ref 8.4–10.5)
CALCIUM SERPL-MCNC: 9.2 MG/DL — SIGNIFICANT CHANGE UP (ref 8.4–10.5)
CHLORIDE SERPL-SCNC: 105 MMOL/L — SIGNIFICANT CHANGE UP (ref 98–107)
CHLORIDE SERPL-SCNC: 105 MMOL/L — SIGNIFICANT CHANGE UP (ref 98–107)
CO2 SERPL-SCNC: 23 MMOL/L — SIGNIFICANT CHANGE UP (ref 22–31)
CO2 SERPL-SCNC: 23 MMOL/L — SIGNIFICANT CHANGE UP (ref 22–31)
CREAT SERPL-MCNC: 0.91 MG/DL — SIGNIFICANT CHANGE UP (ref 0.5–1.3)
CREAT SERPL-MCNC: 0.91 MG/DL — SIGNIFICANT CHANGE UP (ref 0.5–1.3)
EGFR: 100 ML/MIN/1.73M2 — SIGNIFICANT CHANGE UP
EGFR: 100 ML/MIN/1.73M2 — SIGNIFICANT CHANGE UP
GLUCOSE SERPL-MCNC: 98 MG/DL — SIGNIFICANT CHANGE UP (ref 70–99)
GLUCOSE SERPL-MCNC: 98 MG/DL — SIGNIFICANT CHANGE UP (ref 70–99)
HCT VFR BLD CALC: 42.8 % — SIGNIFICANT CHANGE UP (ref 39–50)
HCT VFR BLD CALC: 42.8 % — SIGNIFICANT CHANGE UP (ref 39–50)
HGB BLD-MCNC: 14.6 G/DL — SIGNIFICANT CHANGE UP (ref 13–17)
HGB BLD-MCNC: 14.6 G/DL — SIGNIFICANT CHANGE UP (ref 13–17)
MAGNESIUM SERPL-MCNC: 2 MG/DL — SIGNIFICANT CHANGE UP (ref 1.6–2.6)
MAGNESIUM SERPL-MCNC: 2 MG/DL — SIGNIFICANT CHANGE UP (ref 1.6–2.6)
MCHC RBC-ENTMCNC: 33.3 PG — SIGNIFICANT CHANGE UP (ref 27–34)
MCHC RBC-ENTMCNC: 33.3 PG — SIGNIFICANT CHANGE UP (ref 27–34)
MCHC RBC-ENTMCNC: 34.1 GM/DL — SIGNIFICANT CHANGE UP (ref 32–36)
MCHC RBC-ENTMCNC: 34.1 GM/DL — SIGNIFICANT CHANGE UP (ref 32–36)
MCV RBC AUTO: 97.7 FL — SIGNIFICANT CHANGE UP (ref 80–100)
MCV RBC AUTO: 97.7 FL — SIGNIFICANT CHANGE UP (ref 80–100)
NRBC # BLD: 0 /100 WBCS — SIGNIFICANT CHANGE UP (ref 0–0)
NRBC # BLD: 0 /100 WBCS — SIGNIFICANT CHANGE UP (ref 0–0)
NRBC # FLD: 0 K/UL — SIGNIFICANT CHANGE UP (ref 0–0)
NRBC # FLD: 0 K/UL — SIGNIFICANT CHANGE UP (ref 0–0)
PHOSPHATE SERPL-MCNC: 4 MG/DL — SIGNIFICANT CHANGE UP (ref 2.5–4.5)
PHOSPHATE SERPL-MCNC: 4 MG/DL — SIGNIFICANT CHANGE UP (ref 2.5–4.5)
PLATELET # BLD AUTO: 160 K/UL — SIGNIFICANT CHANGE UP (ref 150–400)
PLATELET # BLD AUTO: 160 K/UL — SIGNIFICANT CHANGE UP (ref 150–400)
POTASSIUM SERPL-MCNC: 4 MMOL/L — SIGNIFICANT CHANGE UP (ref 3.5–5.3)
POTASSIUM SERPL-MCNC: 4 MMOL/L — SIGNIFICANT CHANGE UP (ref 3.5–5.3)
POTASSIUM SERPL-SCNC: 4 MMOL/L — SIGNIFICANT CHANGE UP (ref 3.5–5.3)
POTASSIUM SERPL-SCNC: 4 MMOL/L — SIGNIFICANT CHANGE UP (ref 3.5–5.3)
RBC # BLD: 4.38 M/UL — SIGNIFICANT CHANGE UP (ref 4.2–5.8)
RBC # BLD: 4.38 M/UL — SIGNIFICANT CHANGE UP (ref 4.2–5.8)
RBC # FLD: 12.3 % — SIGNIFICANT CHANGE UP (ref 10.3–14.5)
RBC # FLD: 12.3 % — SIGNIFICANT CHANGE UP (ref 10.3–14.5)
SODIUM SERPL-SCNC: 140 MMOL/L — SIGNIFICANT CHANGE UP (ref 135–145)
SODIUM SERPL-SCNC: 140 MMOL/L — SIGNIFICANT CHANGE UP (ref 135–145)
WBC # BLD: 6.64 K/UL — SIGNIFICANT CHANGE UP (ref 3.8–10.5)
WBC # BLD: 6.64 K/UL — SIGNIFICANT CHANGE UP (ref 3.8–10.5)
WBC # FLD AUTO: 6.64 K/UL — SIGNIFICANT CHANGE UP (ref 3.8–10.5)
WBC # FLD AUTO: 6.64 K/UL — SIGNIFICANT CHANGE UP (ref 3.8–10.5)

## 2023-10-30 PROCEDURE — 99239 HOSP IP/OBS DSCHRG MGMT >30: CPT

## 2023-10-30 PROCEDURE — 93306 TTE W/DOPPLER COMPLETE: CPT | Mod: 26

## 2023-10-30 RX ORDER — METOPROLOL TARTRATE 50 MG
0.5 TABLET ORAL
Qty: 30 | Refills: 0
Start: 2023-10-30 | End: 2023-11-28

## 2023-10-30 RX ORDER — ATORVASTATIN CALCIUM 80 MG/1
1 TABLET, FILM COATED ORAL
Qty: 30 | Refills: 0
Start: 2023-10-30 | End: 2023-11-28

## 2023-10-30 RX ADMIN — SERTRALINE 150 MILLIGRAM(S): 25 TABLET, FILM COATED ORAL at 13:08

## 2023-10-30 NOTE — PROGRESS NOTE ADULT - ASSESSMENT
Pt is a 54 yo M with PMH BP d/o, HTN, and syncope (s/p ILR) p/w multiple syncopal episodes x1d. Hemodynamically stable on arrival with labs largely unremarkable. EKG with sinus bradycardia. CTH neg but with mild L posterior scalp soft tissue swelling. EP consulted, pending recs. 
Pt is a 54 yo M with PMH BP d/o, HTN, and syncope (s/p ILR) p/w multiple syncopal episodes x1d. Hemodynamically stable on arrival with labs largely unremarkable. EKG with sinus bradycardia. CTH neg but with mild L posterior scalp soft tissue swelling. EP consulted, pending recs. 
Detail Level: Generalized
53M with hx of syncope (s/p ILR), Bipolar Disorder, HTN who presents with syncope, found to have new pAfib.

## 2023-10-30 NOTE — DISCHARGE NOTE NURSING/CASE MANAGEMENT/SOCIAL WORK - PATIENT PORTAL LINK FT
You can access the FollowMyHealth Patient Portal offered by [facility] by registering at the following website: [email or web address]. By joining [facility] FollowMyHealth portal, you will also be able to view your health information using other applications (apps) compatible with our system.

## 2023-10-30 NOTE — PROGRESS NOTE ADULT - PROBLEM SELECTOR PLAN 2
p/w syncopal episode while going from seated to standing position while watching tv; preceded with LH and then fell to the ground with repeat episode x3  - similar episode in the past, evaluated by cards and ILR placed  - EKG with sinus bradycardia, no acute ischemic changes, trops neg, orthostats neg  - CTH with mild L posterior scalp soft tissue swelling   - ILR interrogation - Afib RVR on 10/26 (7 hrs) which may correlate with syncope.  - Appreciate EP recs - low dose beta blocker and monitor for tachybrady syndrome; lopressor 12.5mg PO BID, outpatient follow up for ablation.

## 2023-10-30 NOTE — CHART NOTE - NSCHARTNOTEFT_GEN_A_CORE
Telemetry checked.  Sinus rhythm overnight with HR ranging from 40-80s.  No tachyarrhythmic events noted.
Tele:  SR 60's with SB down to mid 40's bpm, no recurrent PAF with RVR seen.  Tolerating low dose of BB.  Follow up with Dr. Mcfarland on 11/22 at 12:00 for AF ablation eval.  Oncology Building 4 th Floor at [facility]    [phone].   Discussed with pt's brother and sister-in-law, patient is interested in AF ablation, they are made aware that uninterrupted AC for minimum 1-3 months Post ablation is required.

## 2023-10-30 NOTE — DISCHARGE NOTE NURSING/CASE MANAGEMENT/SOCIAL WORK - NSDCVIVACCINE_GEN_ALL_CORE_FT
Tdap; 06-Aug-2022 19:11; Chikis Barker (AYESHA); [employer]; [id number] (Exp. Date: 14-May-2024); IntraMuscular; Deltoid Left.; 0.5 milliLiter(s); VIS (VIS Published: 09-May-2013, VIS Presented: 06-Aug-2022);

## 2023-10-30 NOTE — PROGRESS NOTE ADULT - PROBLEM SELECTOR PLAN 3
EKG sinus bradycardia without ischemic changes  - labs largely unremarkable; trop neg x2  - orthostatics performed and neg, however, ?poor chronotropic response   - appreciate EP recs - low dose BB

## 2023-10-30 NOTE — PROGRESS NOTE ADULT - SUBJECTIVE AND OBJECTIVE BOX
PROGRESS NOTE:   Authoted by Dr. Varghese Ladd MD, MITZI  Pager [phone] [facility], Available via [employer] Teams     Patient is a 53y old  Male who presents with a chief complaint of syncope (27 Oct 2023 18:41)    SUBJECTIVE / OVERNIGHT EVENTS:  No acute events overnight   No subjective complaints  Eager to go home     MEDICATIONS  (STANDING):  enoxaparin Injectable 40 milliGRAM(s) SubCutaneous every 24 hours  melatonin 6 milliGRAM(s) Oral at bedtime  QUEtiapine 250 milliGRAM(s) Oral at bedtime  sertraline 150 milliGRAM(s) Oral daily    MEDICATIONS  (PRN):  acetaminophen     Tablet .. 650 milliGRAM(s) Oral every 6 hours PRN Temp greater or equal to 38C (100.4F), Mild Pain (1 - 3)    OBJECTIVE:  Vital Signs Last 24 Hrs  T(C): 36.4 (28 Oct 2023 12:35), Max: 37.4 (28 Oct 2023 05:00)  T(F): 97.6 (28 Oct 2023 12:35), Max: 99.4 (28 Oct 2023 05:00)  HR: 57 (28 Oct 2023 12:35) (57 - 62)  BP: 121/81 (28 Oct 2023 12:35) (104/65 - 121/81)  RR: 17 (28 Oct 2023 12:35) (16 - 18)  SpO2: 99% (28 Oct 2023 12:35) (97% - 99%)    Parameters below as of 28 Oct 2023 12:35  Patient On (Oxygen Delivery Method): room air    I&O's Summary    27 Oct 2023 07:01  -  28 Oct 2023 07:00  --------------------------------------------------------  IN: 0 mL / OUT: 400 mL / NET: -400 mL    CONSTITUTIONAL: NAD  HEAD:  Atraumatic, Normocephalic  EYES: EOMI, conjunctiva and sclera clear  ENMT: No tonsillar erythema, exudates, or enlargement; Moist mucous membranes  NECK: Supple, No JVD  NERVOUS SYSTEM: AOX3, motor and sensation grossly intact in b/l UE and b/l LE  PSYCHIATRIC: Appropriate affect and mood  CHEST/LUNG: Clear to auscultation bilaterally; No rales, rhonchi, wheezing, or rubs  HEART: Regular rate and rhythm; No murmurs. No LE edema  ABDOMEN: Soft, Nontender, Nondistended; Bowel sounds present  EXTREMITIES:  2+ Peripheral Pulses, No clubbing, cyanosis  SKIN: No rashes or lesions    LABS:                        14.2   7.32  )-----------( 164      ( 28 Oct 2023 05:51 )             42.0     10-28    139  |  104  |  15  ----------------------------<  94  4.0   |  23  |  0.91    Ca    9.2      28 Oct 2023 05:51  Phos  3.8     10-28  Mg     1.90     10-28    TPro  6.2  /  Alb  3.9  /  TBili  <0.2  /  DBili  x   /  AST  22  /  ALT  27  /  AlkPhos  64  10-27  
PROGRESS NOTE:   Authoted by Dr. Varghese Ladd MD, MITZI  Pager [phone] [facility], Available via Microsoft Teams     Patient is a 53y old  Male who presents with a chief complaint of syncope (28 Oct 2023 14:52)    SUBJECTIVE / OVERNIGHT EVENTS:  No acute events overnight   No subjective complaints    MEDICATIONS  (STANDING):  enoxaparin Injectable 40 milliGRAM(s) SubCutaneous every 24 hours  melatonin 6 milliGRAM(s) Oral at bedtime  metoprolol tartrate 12.5 milliGRAM(s) Oral two times a day  QUEtiapine 250 milliGRAM(s) Oral at bedtime  sertraline 150 milliGRAM(s) Oral daily    MEDICATIONS  (PRN):  acetaminophen     Tablet .. 650 milliGRAM(s) Oral every 6 hours PRN Temp greater or equal to 38C (100.4F), Mild Pain (1 - 3)    OBJECTIVE:  Vital Signs Last 24 Hrs  T(C): 36.7 (29 Oct 2023 12:34), Max: 36.7 (29 Oct 2023 12:34)  T(F): 98 (29 Oct 2023 12:34), Max: 98 (29 Oct 2023 12:34)  HR: 57 (29 Oct 2023 12:34) (54 - 57)  BP: 115/77 (29 Oct 2023 12:34) (95/65 - 118/68)  RR: 18 (29 Oct 2023 06:15) (18 - 18)  SpO2: 97% (29 Oct 2023 12:34) (97% - 99%)    Parameters below as of 29 Oct 2023 12:34  Patient On (Oxygen Delivery Method): room air    CONSTITUTIONAL: NAD  HEAD:  Atraumatic, Normocephalic  EYES: EOMI, conjunctiva and sclera clear  ENMT: No tonsillar erythema, exudates, or enlargement; Moist mucous membranes  NECK: Supple, No JVD  NERVOUS SYSTEM: AOX3, motor and sensation grossly intact in b/l UE and b/l LE  PSYCHIATRIC: Appropriate affect and mood  CHEST/LUNG: Clear to auscultation bilaterally; No rales, rhonchi, wheezing, or rubs  HEART: Regular rate and rhythm; No murmurs, rubs, or gallops. No LE edema  ABDOMEN: Soft, Nontender, Nondistended; Bowel sounds present  EXTREMITIES:  2+ Peripheral Pulses, No clubbing, cyanosis  SKIN: No rashes or lesions    LABS:                        14.2   7.77  )-----------( 157      ( 29 Oct 2023 05:54 )             42.7     10-29    140  |  103  |  21  ----------------------------<  96  4.1   |  24  |  0.96    Ca    9.2      29 Oct 2023 05:54  Phos  4.3     10-29  Mg     2.10     10-29  
Genia Hernandez MD  [facility] Division of Hospital Medicine  Pager [phone] (M-F 8AM-5PM)  Other Times: [phone]    Patient is a 53y old  Male who presents with a chief complaint of syncope (29 Oct 2023 14:44)    SUBJECTIVE / OVERNIGHT EVENTS: no acute events overnight, feeling better wants to go home     MEDICATIONS  (STANDING):  atorvastatin 20 milliGRAM(s) Oral at bedtime  enoxaparin Injectable 40 milliGRAM(s) SubCutaneous every 24 hours  melatonin 6 milliGRAM(s) Oral at bedtime  metoprolol tartrate 12.5 milliGRAM(s) Oral two times a day  QUEtiapine 250 milliGRAM(s) Oral at bedtime  sertraline 150 milliGRAM(s) Oral daily    MEDICATIONS  (PRN):  acetaminophen     Tablet .. 650 milliGRAM(s) Oral every 6 hours PRN Temp greater or equal to 38C (100.4F), Mild Pain (1 - 3)      PHYSICAL EXAM:  Vital Signs Last 24 Hrs  T(C): 36.4 (30 Oct 2023 12:16), Max: 36.9 (30 Oct 2023 05:00)  T(F): 97.6 (30 Oct 2023 12:16), Max: 98.4 (30 Oct 2023 05:00)  HR: 55 (30 Oct 2023 12:16) (50 - 58)  BP: 112/63 (30 Oct 2023 12:16) (93/58 - 112/63)  RR: 18 (30 Oct 2023 12:16) (18 - 18)  SpO2: 96% (30 Oct 2023 12:16) (96% - 98%)    Parameters below as of 30 Oct 2023 12:16  Patient On (Oxygen Delivery Method): room air    CONSTITUTIONAL: NAD, well-developed, well-groomed  RESPIRATORY: Normal respiratory effort; lungs are clear to auscultation bilaterally  CARDIOVASCULAR: Regular rate and rhythm, normal S1 and S2, no murmur/rub/gallop; No lower extremity edema  GASTROINTESTINAL: Nontender to palpation, normoactive bowel sounds, no rebound/guarding; No hepatosplenomegaly  MUSCULOSKELETAL:  no clubbing or cyanosis of digits; no joint swelling or tenderness to palpation  NEUROLOGY: non-focal; no gross sensory deficits   PSYCH: A+O to person, place, and time; affect appropriate  SKIN: No rashes; warm     LABS:                        14.6   6.64  )-----------( 160      ( 30 Oct 2023 06:32 )             42.8     10-30    140  |  105  |  17  ----------------------------<  98  4.0   |  23  |  0.91    Ca    9.2      30 Oct 2023 06:32  Phos  4.0     10-30  Mg     2.00     10-30            Urinalysis Basic - ( 30 Oct 2023 06:32 )    Color: x / Appearance: x / SG: x / pH: x  Gluc: 98 mg/dL / Ketone: x  / Bili: x / Urobili: x   Blood: x / Protein: x / Nitrite: x   Leuk Esterase: x / RBC: x / WBC x   Sq Epi: x / Non Sq Epi: x / Bacteria: x          RADIOLOGY & ADDITIONAL TESTS:  Results Reviewed:   Imaging Personally Reviewed:  Electrocardiogram Personally Reviewed:    COORDINATION OF CARE:  Care Discussed with Consultants/Other Providers [Y/N]:  Prior or Outpatient Records Reviewed [Y/N]:

## 2023-10-30 NOTE — PHARMACOTHERAPY INTERVENTION NOTE - COMMENTS
Discharge medications were reviewed with the patient. Current medication schedule was discussed in detail including: medication name, indication, dose, administration times, side effects, and special instructions. All questions and concerns were answered and addressed. Patient verbalized understanding and was provided with educational drug cards.     Corine Gabriel, PharmD  Clinical Pharmacy Specialist  [facility] [id number]

## 2023-10-30 NOTE — DISCHARGE NOTE PROVIDER - CARE PROVIDER_API CALL
Harvey Mcfarland  Cardiac Electrophysiology  [address] [address] [place] [address], [address]  [place] [address]  Phone: [phone]  Fax: [phone]  Follow Up Time:     Dale Briceño  Gastroenterology  [address] [facility], [address]  [place] [address]  Phone: [phone]  Fax: [phone]  Follow Up Time:

## 2023-10-30 NOTE — DISCHARGE NOTE NURSING/CASE MANAGEMENT/SOCIAL WORK - NSDCPEFALRISK_GEN_ALL_CORE
For information on Fall & Injury Prevention, visit: [email or web address] OR  [email or web address] OR  https://www.cdc.gov/steadi/patient.html

## 2023-10-30 NOTE — PROGRESS NOTE ADULT - PROBLEM SELECTOR PLAN 1
Found to have pAF after interrogation of ILR  - TTE unremarkable   - appreciate EP recs: lopressor 12.5mg BID, outpatient followup for possible ablation  - [id number]: 1 will defer AC given low stroke risk and frequent syncope

## 2023-10-30 NOTE — PROGRESS NOTE ADULT - PROBLEM SELECTOR PLAN 4
s/p fall  - CTH with mild L posterior scalp soft tissue swelling   - supportive care, fall precautions

## 2023-10-30 NOTE — DISCHARGE NOTE PROVIDER - NSDCMRMEDTOKEN_GEN_ALL_CORE_FT
melatonin 1 mg oral tablet: 6 tab(s) orally once a day (at bedtime)  QUEtiapine 50 mg oral tablet: 5 tab(s) orally once a day (at bedtime)  sertraline 150 mg oral capsule: 1 cap(s) orally once a day (at bedtime)   atorvastatin 20 mg oral tablet: 1 tab(s) orally once a day (at bedtime)  melatonin 1 mg oral tablet: 6 tab(s) orally once a day (at bedtime)  metoprolol tartrate 25 mg oral tablet: 0.5 tab(s) orally 2 times a day  QUEtiapine 50 mg oral tablet: 5 tab(s) orally once a day (at bedtime)  sertraline 150 mg oral capsule: 1 cap(s) orally once a day (at bedtime)

## 2023-10-30 NOTE — DISCHARGE NOTE PROVIDER - NSDCFUSCHEDAPPT_GEN_ALL_CORE_FT
[facility] [phone]  Scheduled Appointment: 11/01/2023    [facility] [phone]  Scheduled Appointment: 11/13/2023    Harvey Mcfarland  [facility] [phone]  Scheduled Appointment: 11/22/2023

## 2023-10-30 NOTE — DISCHARGE NOTE PROVIDER - NSDCCPCAREPLAN_GEN_ALL_CORE_FT
PRINCIPAL DISCHARGE DIAGNOSIS  Diagnosis: Paroxysmal atrial fibrillation  Assessment and Plan of Treatment: You were found to have an irregular heart rhythm. Please take your medications as prescribed. Please follow up with electrophysiology doctors after discharge.

## 2023-11-01 ENCOUNTER — APPOINTMENT (OUTPATIENT)
Dept: ELECTROPHYSIOLOGY | Facility: CLINIC | Age: 53
End: 2023-11-01
Payer: COMMERCIAL

## 2023-11-01 ENCOUNTER — NON-APPOINTMENT (OUTPATIENT)
Age: 53
End: 2023-11-01

## 2023-11-01 ENCOUNTER — APPOINTMENT (OUTPATIENT)
Dept: ELECTROPHYSIOLOGY | Facility: CLINIC | Age: 53
End: 2023-11-01

## 2023-11-01 PROCEDURE — 93298 REM INTERROG DEV EVAL SCRMS: CPT | Mod: NC

## 2023-11-01 PROCEDURE — G2066: CPT

## 2023-11-22 ENCOUNTER — NON-APPOINTMENT (OUTPATIENT)
Age: 53
End: 2023-11-22

## 2023-11-22 ENCOUNTER — APPOINTMENT (OUTPATIENT)
Dept: ELECTROPHYSIOLOGY | Facility: CLINIC | Age: 53
End: 2023-11-22
Payer: COMMERCIAL

## 2023-11-22 VITALS
HEART RATE: 56 BPM | SYSTOLIC BLOOD PRESSURE: 97 MMHG | DIASTOLIC BLOOD PRESSURE: 71 MMHG | HEIGHT: 71 IN | WEIGHT: 225 LBS | BODY MASS INDEX: 31.5 KG/M2 | OXYGEN SATURATION: 96 %

## 2023-11-22 PROCEDURE — 99204 OFFICE O/P NEW MOD 45 MIN: CPT | Mod: 25

## 2023-11-22 PROCEDURE — 93000 ELECTROCARDIOGRAM COMPLETE: CPT

## 2023-11-22 RX ORDER — ATORVASTATIN CALCIUM 20 MG/1
20 TABLET, FILM COATED ORAL
Qty: 90 | Refills: 0 | Status: ACTIVE | COMMUNITY
Start: 2023-11-22 | End: 1900-01-01

## 2023-11-22 RX ORDER — METOPROLOL TARTRATE 25 MG/1
25 TABLET, FILM COATED ORAL
Qty: 180 | Refills: 1 | Status: ACTIVE | COMMUNITY
Start: 2023-11-22 | End: 1900-01-01

## 2023-12-22 NOTE — BH PATIENT PROFILE - NSVRISKLACKINSIGHT_PSY_ALL_CORE
EXCUSE FOR SCHOOL      2023        Re: Jaleesa Sierra  [address] [address]      Please excuse Jaleesa Sierra,  2008 from school on 23 due to injury.      RESTRICTIONS: She must wear brace x 1 week at all times. ENDS 23        Electronically signed by MOON Stewart: ARON ,   2023  MOON Stewart: [facility]       MOON Stewart  [address] [address]  Phone: [phone]  
Yes

## 2023-12-28 ENCOUNTER — APPOINTMENT (OUTPATIENT)
Dept: ELECTROPHYSIOLOGY | Facility: CLINIC | Age: 53
End: 2023-12-28
Payer: COMMERCIAL

## 2023-12-28 ENCOUNTER — NON-APPOINTMENT (OUTPATIENT)
Age: 53
End: 2023-12-28

## 2023-12-28 PROCEDURE — G2066: CPT

## 2023-12-28 PROCEDURE — 93298 REM INTERROG DEV EVAL SCRMS: CPT

## 2024-01-31 ENCOUNTER — NON-APPOINTMENT (OUTPATIENT)
Age: 54
End: 2024-01-31

## 2024-01-31 ENCOUNTER — APPOINTMENT (OUTPATIENT)
Dept: ELECTROPHYSIOLOGY | Facility: CLINIC | Age: 54
End: 2024-01-31
Payer: COMMERCIAL

## 2024-02-01 PROCEDURE — 93298 REM INTERROG DEV EVAL SCRMS: CPT

## 2024-02-21 ENCOUNTER — APPOINTMENT (OUTPATIENT)
Dept: ELECTROPHYSIOLOGY | Facility: CLINIC | Age: 54
End: 2024-02-21
Payer: COMMERCIAL

## 2024-02-21 ENCOUNTER — NON-APPOINTMENT (OUTPATIENT)
Age: 54
End: 2024-02-21

## 2024-02-21 VITALS
SYSTOLIC BLOOD PRESSURE: 101 MMHG | DIASTOLIC BLOOD PRESSURE: 65 MMHG | WEIGHT: 230 LBS | OXYGEN SATURATION: 97 % | HEIGHT: 71 IN | HEART RATE: 68 BPM | BODY MASS INDEX: 32.2 KG/M2

## 2024-02-21 PROCEDURE — 99214 OFFICE O/P EST MOD 30 MIN: CPT | Mod: 25

## 2024-02-21 PROCEDURE — 93000 ELECTROCARDIOGRAM COMPLETE: CPT

## 2024-02-29 NOTE — CARDIOLOGY SUMMARY
[de-identified] : 11/22/23 : sinus bradycardia at 53 bpm.  2/21/2024: Sinus rhythm at 65 bpm [de-identified] : 10/30/23:  1. Left ventricular cavity is normal. Left ventricular wall thickness is normal. Left ventricular systolic function is normal with an ejection fraction of 65 % by Mckoy's method of disks.  2. There is mild (grade 1) left ventricular diastolic dysfunction.  3. Normal right ventricular cavity size and systolic function.  4. Structurally normal mitral valve with normal leaflet excursion. There is trace mitral regurgitation.

## 2024-02-29 NOTE — DISCUSSION/SUMMARY
[FreeTextEntry1] : In summary, this is a 53 year old man with cardiovascular history significant for HTN, syncope s/p MDT ILR in 8/19/2022, and pAF. He has PMHx of bipolar disorder, depression.  His recent hospital visit for fall/syncope does not correlate with the timing of the atrial fibrillation seen on the loop recorder.  He continues to remain in sinus rhythm.  His metoprolol was stopped for asymptomatic bradycardia.  If he has recurrent A-fib I would restart his beta-blocker as I am not aware about his bradycardia.  We are not planning on ablation at this time as he is not anticoagulated.   Mr. Marti appeared to understand the whole discussion and verbalized that all of his questions were answered to his satisfaction.   Thank you for allowing me to be involved in the care of this pleasant man. Please feel free to contact me with any questions. [EKG obtained to assist in diagnosis and management of assessed problem(s)] : EKG obtained to assist in diagnosis and management of assessed problem(s)

## 2024-02-29 NOTE — HISTORY OF PRESENT ILLNESS
[FreeTextEntry1] : Referring Physician: Evelyn Jimenez MD   Dear Dr. Jimenez:   Mr. Marti was seen in the [facility] today. For our records, please allow me to summarize the history and my findings.   This pleasant 53-year-old man has a cardiovascular history significant for HTN, syncope s/p MDT ILR in 8/19/2022, and pAF. He has PMHx of bipolar disorder, depression.  He was initially hospitalized a year ago with an episode of syncope.  He had a loop recorder placed at that time.  He had no events over that time. He presented to [facility] on 10/27/2023 after a reported fall that was unwitnessed on the stairs.  He was refusing to be syncope is unclear.  His loop monitor revealed short episodes of atrial fibrillation that did not correlate with the timing of the fall/syncope.  He was initiated on metoprolol and has been doing well since.  He is not on oral anticoagulation due to the concern that he is going to fall and bleed.  He has been maintaining sinus rhythm but his primary care doctor stopped his metoprolol as he was bradycardic in the office.  He continues to be asymptomatic.  Mr. Zambrano denies any recent history of chest pain, shortness of breath, palpitations, or dizziness.

## 2024-03-06 ENCOUNTER — NON-APPOINTMENT (OUTPATIENT)
Age: 54
End: 2024-03-06

## 2024-03-06 ENCOUNTER — APPOINTMENT (OUTPATIENT)
Dept: ELECTROPHYSIOLOGY | Facility: CLINIC | Age: 54
End: 2024-03-06
Payer: COMMERCIAL

## 2024-03-06 PROCEDURE — 93298 REM INTERROG DEV EVAL SCRMS: CPT

## 2024-04-10 ENCOUNTER — NON-APPOINTMENT (OUTPATIENT)
Age: 54
End: 2024-04-10

## 2024-04-10 ENCOUNTER — APPOINTMENT (OUTPATIENT)
Dept: ELECTROPHYSIOLOGY | Facility: CLINIC | Age: 54
End: 2024-04-10
Payer: COMMERCIAL

## 2024-04-10 PROCEDURE — 93298 REM INTERROG DEV EVAL SCRMS: CPT

## 2024-05-22 ENCOUNTER — NON-APPOINTMENT (OUTPATIENT)
Age: 54
End: 2024-05-22

## 2024-05-22 ENCOUNTER — APPOINTMENT (OUTPATIENT)
Dept: ELECTROPHYSIOLOGY | Facility: CLINIC | Age: 54
End: 2024-05-22
Payer: COMMERCIAL

## 2024-05-22 VITALS
HEIGHT: 71 IN | BODY MASS INDEX: 32.2 KG/M2 | OXYGEN SATURATION: 96 % | DIASTOLIC BLOOD PRESSURE: 67 MMHG | WEIGHT: 230 LBS | SYSTOLIC BLOOD PRESSURE: 95 MMHG | HEART RATE: 55 BPM

## 2024-05-22 DIAGNOSIS — R55 SYNCOPE AND COLLAPSE: ICD-10-CM

## 2024-05-22 DIAGNOSIS — I48.91 UNSPECIFIED ATRIAL FIBRILLATION: ICD-10-CM

## 2024-05-22 PROCEDURE — 93000 ELECTROCARDIOGRAM COMPLETE: CPT

## 2024-05-22 PROCEDURE — 99214 OFFICE O/P EST MOD 30 MIN: CPT | Mod: 25

## 2024-05-22 NOTE — DISCUSSION/SUMMARY
[FreeTextEntry1] : In summary, this is a 53 year old man with cardiovascular history significant for HTN, syncope s/p MDT ILR in 8/19/2022, and pAF. He has PMHx of bipolar disorder, depression.  His episodes of falls/syncope do not correlate to any arrhythmias.  He has not had any atrial fibrillation on his loop recorder recently.  He does report worsening tremors in his hands, I recommended he see a neurologist for potential diagnosis of tremors, Parkinson-like disease that may be causing his falls.  He can return to the office in 3 months.   Mr. Marti appeared to understand the whole discussion and verbalized that all of his questions were answered to his satisfaction.   Thank you for allowing me to be involved in the care of this pleasant man. Please feel free to contact me with any questions. [EKG obtained to assist in diagnosis and management of assessed problem(s)] : EKG obtained to assist in diagnosis and management of assessed problem(s)

## 2024-05-22 NOTE — HISTORY OF PRESENT ILLNESS
[FreeTextEntry1] : Referring Physician: Evelyn Jimenez MD   Dear Dr. Jimenez:   Mr. Marti was seen in the [facility] Electrophysiology [facility] today. For our records, please allow me to summarize the history and my findings.   This pleasant 53-year-old man has a cardiovascular history significant for HTN, syncope s/p MDT ILR in 8/19/2022, and pAF. He has PMHx of bipolar disorder, depression.  He was initially hospitalized a year ago with an episode of syncope.  He had a loop recorder placed at that time.  He had no events over that time. He presented to [facility] on 10/27/2023 after a reported fall that was unwitnessed on the stairs.  His loop monitor revealed short episodes of atrial fibrillation that did not correlate with the timing of the fall/syncope.  He was initiated on metoprolol and has been doing well since.  He is not on oral anticoagulation due to the concern that he is going to fall and bleed.  He has been maintaining sinus rhythm but his primary care doctor stopped his metoprolol as he was bradycardic in the office.  He continues to be asymptomatic.  He does report increasing active tremors in his hands.  Mr. Zambrano denies any recent history of chest pain, shortness of breath, palpitations, or dizziness.

## 2024-05-22 NOTE — CARDIOLOGY SUMMARY
[de-identified] : 11/22/23 : sinus bradycardia at 53 bpm.  2/21/2024: Sinus rhythm at 65 bpm 5/22/2024: sinus bradycardia at 53 bpm, rsR' [de-identified] : 10/30/23:  1. Left ventricular cavity is normal. Left ventricular wall thickness is normal. Left ventricular systolic function is normal with an ejection fraction of 65 % by Mckoy's method of disks.  2. There is mild (grade 1) left ventricular diastolic dysfunction.  3. Normal right ventricular cavity size and systolic function.  4. Structurally normal mitral valve with normal leaflet excursion. There is trace mitral regurgitation.

## 2024-06-09 ENCOUNTER — APPOINTMENT (OUTPATIENT)
Dept: RADIOLOGY | Facility: IMAGING CENTER | Age: 54
End: 2024-06-09

## 2024-06-09 ENCOUNTER — APPOINTMENT (OUTPATIENT)
Dept: ULTRASOUND IMAGING | Facility: IMAGING CENTER | Age: 54
End: 2024-06-09

## 2024-06-24 ENCOUNTER — NON-APPOINTMENT (OUTPATIENT)
Age: 54
End: 2024-06-24

## 2024-06-24 ENCOUNTER — APPOINTMENT (OUTPATIENT)
Dept: ELECTROPHYSIOLOGY | Facility: CLINIC | Age: 54
End: 2024-06-24
Payer: COMMERCIAL

## 2024-06-24 PROCEDURE — 93298 REM INTERROG DEV EVAL SCRMS: CPT

## 2024-07-03 PROCEDURE — 99214 OFFICE O/P EST MOD 30 MIN: CPT

## 2024-07-09 ENCOUNTER — APPOINTMENT (OUTPATIENT)
Dept: RADIOLOGY | Facility: IMAGING CENTER | Age: 54
End: 2024-07-09
Payer: COMMERCIAL

## 2024-07-09 ENCOUNTER — OUTPATIENT (OUTPATIENT)
Dept: OUTPATIENT SERVICES | Facility: HOSPITAL | Age: 54
LOS: 1 days | End: 2024-07-09
Payer: COMMERCIAL

## 2024-07-09 ENCOUNTER — APPOINTMENT (OUTPATIENT)
Dept: ULTRASOUND IMAGING | Facility: IMAGING CENTER | Age: 54
End: 2024-07-09
Payer: COMMERCIAL

## 2024-07-09 DIAGNOSIS — Z00.8 ENCOUNTER FOR OTHER GENERAL EXAMINATION: ICD-10-CM

## 2024-07-09 PROCEDURE — 71046 X-RAY EXAM CHEST 2 VIEWS: CPT

## 2024-07-09 PROCEDURE — 76700 US EXAM ABDOM COMPLETE: CPT | Mod: 26

## 2024-07-09 PROCEDURE — 76700 US EXAM ABDOM COMPLETE: CPT

## 2024-07-09 PROCEDURE — 71046 X-RAY EXAM CHEST 2 VIEWS: CPT | Mod: 26

## 2024-07-25 ENCOUNTER — NON-APPOINTMENT (OUTPATIENT)
Age: 54
End: 2024-07-25

## 2024-07-25 ENCOUNTER — APPOINTMENT (OUTPATIENT)
Dept: ELECTROPHYSIOLOGY | Facility: CLINIC | Age: 54
End: 2024-07-25
Payer: COMMERCIAL

## 2024-07-25 PROCEDURE — 93298 REM INTERROG DEV EVAL SCRMS: CPT

## 2024-07-27 ENCOUNTER — APPOINTMENT (OUTPATIENT)
Dept: MRI IMAGING | Facility: CLINIC | Age: 54
End: 2024-07-27
Payer: COMMERCIAL

## 2024-07-27 PROCEDURE — 70551 MRI BRAIN STEM W/O DYE: CPT | Mod: 26

## 2024-07-27 PROCEDURE — 72141 MRI NECK SPINE W/O DYE: CPT | Mod: 26

## 2024-08-20 ENCOUNTER — NON-APPOINTMENT (OUTPATIENT)
Age: 54
End: 2024-08-20

## 2024-08-21 ENCOUNTER — NON-APPOINTMENT (OUTPATIENT)
Age: 54
End: 2024-08-21

## 2024-08-21 ENCOUNTER — APPOINTMENT (OUTPATIENT)
Dept: ELECTROPHYSIOLOGY | Facility: CLINIC | Age: 54
End: 2024-08-21
Payer: COMMERCIAL

## 2024-08-21 VITALS
WEIGHT: 234 LBS | BODY MASS INDEX: 32.76 KG/M2 | DIASTOLIC BLOOD PRESSURE: 72 MMHG | HEART RATE: 54 BPM | SYSTOLIC BLOOD PRESSURE: 107 MMHG | OXYGEN SATURATION: 97 % | HEIGHT: 71 IN

## 2024-08-21 DIAGNOSIS — I48.91 UNSPECIFIED ATRIAL FIBRILLATION: ICD-10-CM

## 2024-08-21 PROCEDURE — 93000 ELECTROCARDIOGRAM COMPLETE: CPT

## 2024-08-21 PROCEDURE — 99214 OFFICE O/P EST MOD 30 MIN: CPT | Mod: 25

## 2024-08-21 NOTE — CARDIOLOGY SUMMARY
[de-identified] : 10/30/23:  1. Left ventricular cavity is normal. Left ventricular wall thickness is normal. Left ventricular systolic function is normal with an ejection fraction of 65 % by Mckoy's method of disks.  2. There is mild (grade 1) left ventricular diastolic dysfunction.  3. Normal right ventricular cavity size and systolic function.  4. Structurally normal mitral valve with normal leaflet excursion. There is trace mitral regurgitation.  [de-identified] : 11/22/23 : sinus bradycardia at 53 bpm.  2/21/2024: Sinus rhythm at 65 bpm 5/22/2024: sinus bradycardia at 53 bpm, rsR' 8/21/2024: Sinus bradycardia at 53 bpm, rsR'

## 2024-08-21 NOTE — HISTORY OF PRESENT ILLNESS
[FreeTextEntry1] : Referring Physician: Evelyn Jimenez MD   Dear Dr. Jimenez:   Mr. Marti was seen in the [facility] Electrophysiology Clinic today. For our records, please allow me to summarize the history and my findings.   This pleasant 53-year-old man has a cardiovascular history significant for HTN, syncope s/p MDT ILR in 8/19/2022, and pAF. He has PMHx of bipolar disorder, depression.  He was initially hospitalized a year ago with an episode of syncope.  He had a loop recorder placed at that time.  He had no events over that time. He presented to [facility] on 10/27/2023 after a reported fall that was unwitnessed on the stairs.  His loop monitor revealed short episodes of atrial fibrillation that did not correlate with the timing of the fall/syncope.  He was initiated on metoprolol and has been doing well since.  He is not on oral anticoagulation due to the concern that he is going to fall and bleed.  He has been maintaining sinus rhythm but his primary care doctor stopped his metoprolol as he was bradycardic in the office.  He continues to be asymptomatic.  Mr. Zambrano denies any recent history of chest pain, shortness of breath, palpitations, or dizziness.

## 2024-08-21 NOTE — DISCUSSION/SUMMARY
[FreeTextEntry1] : In summary, this is a 53 year old man with cardiovascular history significant for HTN, syncope s/p MDT ILR in 8/19/2022, and pAF. He has PMHx of bipolar disorder, depression.  His episodes of falls/syncope do not correlate to any arrhythmias.  He has not had any atrial fibrillation on his loop recorder recently.  He continues to do well. Will RTC in 1 year.  Mr. Marti appeared to understand the whole discussion and verbalized that all of his questions were answered to his satisfaction.   Thank you for allowing me to be involved in the care of this pleasant man. Please feel free to contact me with any questions. [EKG obtained to assist in diagnosis and management of assessed problem(s)] : EKG obtained to assist in diagnosis and management of assessed problem(s)

## 2024-09-06 ENCOUNTER — APPOINTMENT (OUTPATIENT)
Dept: MRI IMAGING | Facility: CLINIC | Age: 54
End: 2024-09-06

## 2024-09-06 ENCOUNTER — OUTPATIENT (OUTPATIENT)
Dept: OUTPATIENT SERVICES | Facility: HOSPITAL | Age: 54
LOS: 1 days | End: 2024-09-06
Payer: COMMERCIAL

## 2024-09-06 DIAGNOSIS — Z00.8 ENCOUNTER FOR OTHER GENERAL EXAMINATION: ICD-10-CM

## 2024-09-06 PROCEDURE — 72148 MRI LUMBAR SPINE W/O DYE: CPT | Mod: 26

## 2024-09-06 PROCEDURE — 72146 MRI CHEST SPINE W/O DYE: CPT

## 2024-09-06 PROCEDURE — 72146 MRI CHEST SPINE W/O DYE: CPT | Mod: 26

## 2024-09-06 PROCEDURE — 72148 MRI LUMBAR SPINE W/O DYE: CPT

## 2024-09-23 ENCOUNTER — APPOINTMENT (OUTPATIENT)
Dept: ELECTROPHYSIOLOGY | Facility: CLINIC | Age: 54
End: 2024-09-23
Payer: COMMERCIAL

## 2024-09-23 ENCOUNTER — NON-APPOINTMENT (OUTPATIENT)
Age: 54
End: 2024-09-23

## 2024-09-23 PROCEDURE — 93298 REM INTERROG DEV EVAL SCRMS: CPT

## 2024-10-28 ENCOUNTER — APPOINTMENT (OUTPATIENT)
Dept: ELECTROPHYSIOLOGY | Facility: CLINIC | Age: 54
End: 2024-10-28
Payer: COMMERCIAL

## 2024-10-28 ENCOUNTER — NON-APPOINTMENT (OUTPATIENT)
Age: 54
End: 2024-10-28

## 2024-10-28 PROCEDURE — 93298 REM INTERROG DEV EVAL SCRMS: CPT

## 2024-12-02 ENCOUNTER — NON-APPOINTMENT (OUTPATIENT)
Age: 54
End: 2024-12-02

## 2024-12-02 ENCOUNTER — APPOINTMENT (OUTPATIENT)
Dept: ELECTROPHYSIOLOGY | Facility: CLINIC | Age: 54
End: 2024-12-02
Payer: COMMERCIAL

## 2024-12-02 PROCEDURE — 93298 REM INTERROG DEV EVAL SCRMS: CPT

## 2025-01-03 ENCOUNTER — NON-APPOINTMENT (OUTPATIENT)
Age: 55
End: 2025-01-03

## 2025-01-03 ENCOUNTER — APPOINTMENT (OUTPATIENT)
Dept: ELECTROPHYSIOLOGY | Facility: CLINIC | Age: 55
End: 2025-01-03

## 2025-01-03 PROCEDURE — 93298 REM INTERROG DEV EVAL SCRMS: CPT

## 2025-02-05 ENCOUNTER — APPOINTMENT (OUTPATIENT)
Dept: ELECTROPHYSIOLOGY | Facility: CLINIC | Age: 55
End: 2025-02-05
Payer: COMMERCIAL

## 2025-02-05 ENCOUNTER — NON-APPOINTMENT (OUTPATIENT)
Age: 55
End: 2025-02-05

## 2025-02-05 PROCEDURE — 93298 REM INTERROG DEV EVAL SCRMS: CPT

## 2025-03-12 ENCOUNTER — NON-APPOINTMENT (OUTPATIENT)
Age: 55
End: 2025-03-12

## 2025-03-12 ENCOUNTER — APPOINTMENT (OUTPATIENT)
Dept: ELECTROPHYSIOLOGY | Facility: CLINIC | Age: 55
End: 2025-03-12
Payer: COMMERCIAL

## 2025-03-12 PROCEDURE — 93298 REM INTERROG DEV EVAL SCRMS: CPT

## 2025-04-16 ENCOUNTER — NON-APPOINTMENT (OUTPATIENT)
Age: 55
End: 2025-04-16

## 2025-04-16 ENCOUNTER — APPOINTMENT (OUTPATIENT)
Dept: ELECTROPHYSIOLOGY | Facility: CLINIC | Age: 55
End: 2025-04-16
Payer: COMMERCIAL

## 2025-04-16 PROCEDURE — 93298 REM INTERROG DEV EVAL SCRMS: CPT

## 2025-05-19 ENCOUNTER — NON-APPOINTMENT (OUTPATIENT)
Age: 55
End: 2025-05-19

## 2025-05-19 ENCOUNTER — APPOINTMENT (OUTPATIENT)
Dept: ELECTROPHYSIOLOGY | Facility: CLINIC | Age: 55
End: 2025-05-19
Payer: COMMERCIAL

## 2025-05-19 PROCEDURE — 93298 REM INTERROG DEV EVAL SCRMS: CPT

## 2025-06-23 ENCOUNTER — NON-APPOINTMENT (OUTPATIENT)
Age: 55
End: 2025-06-23

## 2025-06-23 ENCOUNTER — APPOINTMENT (OUTPATIENT)
Dept: ELECTROPHYSIOLOGY | Facility: CLINIC | Age: 55
End: 2025-06-23
Payer: COMMERCIAL

## 2025-06-23 PROCEDURE — 93298 REM INTERROG DEV EVAL SCRMS: CPT

## 2025-07-28 ENCOUNTER — NON-APPOINTMENT (OUTPATIENT)
Age: 55
End: 2025-07-28

## 2025-07-28 ENCOUNTER — APPOINTMENT (OUTPATIENT)
Dept: ELECTROPHYSIOLOGY | Facility: CLINIC | Age: 55
End: 2025-07-28
Payer: COMMERCIAL

## 2025-07-28 PROCEDURE — 93298 REM INTERROG DEV EVAL SCRMS: CPT

## 2025-08-20 ENCOUNTER — NON-APPOINTMENT (OUTPATIENT)
Age: 55
End: 2025-08-20

## 2025-08-20 ENCOUNTER — APPOINTMENT (OUTPATIENT)
Dept: ELECTROPHYSIOLOGY | Facility: CLINIC | Age: 55
End: 2025-08-20

## 2025-08-20 VITALS
WEIGHT: 246 LBS | DIASTOLIC BLOOD PRESSURE: 70 MMHG | HEART RATE: 61 BPM | TEMPERATURE: 97.9 F | RESPIRATION RATE: 16 BRPM | OXYGEN SATURATION: 97 % | SYSTOLIC BLOOD PRESSURE: 113 MMHG | BODY MASS INDEX: 34.31 KG/M2

## 2025-08-20 DIAGNOSIS — I48.91 UNSPECIFIED ATRIAL FIBRILLATION: ICD-10-CM

## 2025-08-20 DIAGNOSIS — R55 SYNCOPE AND COLLAPSE: ICD-10-CM

## 2025-08-20 PROCEDURE — G2211 COMPLEX E/M VISIT ADD ON: CPT | Mod: NC

## 2025-08-20 PROCEDURE — 93000 ELECTROCARDIOGRAM COMPLETE: CPT

## 2025-08-20 PROCEDURE — 99214 OFFICE O/P EST MOD 30 MIN: CPT

## 2025-08-28 ENCOUNTER — APPOINTMENT (OUTPATIENT)
Dept: ELECTROPHYSIOLOGY | Facility: CLINIC | Age: 55
End: 2025-08-28
Payer: COMMERCIAL

## 2025-08-28 ENCOUNTER — NON-APPOINTMENT (OUTPATIENT)
Age: 55
End: 2025-08-28

## 2025-08-28 PROCEDURE — 93298 REM INTERROG DEV EVAL SCRMS: CPT
